# Patient Record
Sex: FEMALE | Race: WHITE | NOT HISPANIC OR LATINO | Employment: OTHER | URBAN - METROPOLITAN AREA
[De-identification: names, ages, dates, MRNs, and addresses within clinical notes are randomized per-mention and may not be internally consistent; named-entity substitution may affect disease eponyms.]

---

## 2017-02-17 ENCOUNTER — OFFICE VISIT (OUTPATIENT)
Dept: OPTOMETRY | Facility: CLINIC | Age: 47
End: 2017-02-17
Payer: COMMERCIAL

## 2017-02-17 DIAGNOSIS — H52.4 PRESBYOPIA: ICD-10-CM

## 2017-02-17 DIAGNOSIS — H43.812 PVD (POSTERIOR VITREOUS DETACHMENT), LEFT EYE: ICD-10-CM

## 2017-02-17 DIAGNOSIS — H35.712 CENTRAL SEROUS RETINOPATHY, LEFT: Primary | ICD-10-CM

## 2017-02-17 PROCEDURE — 92134 CPTRZ OPH DX IMG PST SGM RTA: CPT | Mod: S$GLB,,, | Performed by: OPTOMETRIST

## 2017-02-17 PROCEDURE — 99999 PR PBB SHADOW E&M-EST. PATIENT-LVL II: CPT | Mod: PBBFAC,,, | Performed by: OPTOMETRIST

## 2017-02-17 PROCEDURE — 92015 DETERMINE REFRACTIVE STATE: CPT | Mod: S$GLB,,, | Performed by: OPTOMETRIST

## 2017-02-17 PROCEDURE — 92014 COMPRE OPH EXAM EST PT 1/>: CPT | Mod: S$GLB,,, | Performed by: OPTOMETRIST

## 2017-02-17 NOTE — PROGRESS NOTES
HPI     Ocular health exam   KATHARINE 09/11/2014  Ms. Lobato is here today to continue ocular health care.  Pt sts decline in va both dist and reading since last eye exam   Dist>Reading   Also increased flashing lights (1-2 secs) along with headaches and also   has been having clear/transparent floater in left eye or something   interfering with vision comes and goes. Also gets floaters often.  Hemicrania Left side /Migraines worse within the last 6 weeks.  Just currently wears otc readers +1.25 which help   (+)Flashes (+)Floaters  (-)Itch, (+)tear w/ headaches mainly OS, (-)burn, (-)Dryness.  (-) OTC   Drops  (+)Photophobia w/ headaches   (+)Glare       Last edited by Daniel Saavedra, OD on 2/17/2017  8:34 AM.         Assessment /Plan     For exam results, see Encounter Report.    Central serous retinopathy, left  -     OCT- Retina; Future  -OCT ruled out CSP, loss of FR in OS only, Metamorphopsia on Amsler could be PVD    PVD (posterior vitreous detachment), left eye  -Reviewed signs and symptoms of retinal detachment. Pt instructed to return to clinic immediately with flashes, floaters, or veil of darkness in vision.  Follow up DFE in 1 mos    Presbyopia  Eyeglass Final Rx     Eyeglass Final Rx      Sphere Cylinder   Right +0.75 Sphere   Left +0.75 Sphere       Type:  Metropia - Blaze DFM    Expiration Date:  2/18/2018                  RTC 1 mo

## 2017-03-17 ENCOUNTER — OFFICE VISIT (OUTPATIENT)
Dept: OPTOMETRY | Facility: CLINIC | Age: 47
End: 2017-03-17
Payer: COMMERCIAL

## 2017-03-17 DIAGNOSIS — H43.812 PVD (POSTERIOR VITREOUS DETACHMENT), LEFT EYE: Primary | ICD-10-CM

## 2017-03-17 PROCEDURE — 99999 PR PBB SHADOW E&M-EST. PATIENT-LVL II: CPT | Mod: PBBFAC,,, | Performed by: OPTOMETRIST

## 2017-03-17 PROCEDURE — 92014 COMPRE OPH EXAM EST PT 1/>: CPT | Mod: S$GLB,,, | Performed by: OPTOMETRIST

## 2017-03-17 NOTE — PROGRESS NOTES
HPI     Concerns About Ocular Health    Additional comments: 1 month f/u            Comments   Patient is in for a 1 month f/u PVD   Patient states within the last couple of weeks her blood pressure has been   up and down.    /78 taken at 8:21 am by Nurse Jodie Gallegos per Pt's request  (+) blurry vision  (+) floaters   (+) flashes of light OS reduced since last visit       Last edited by Daniel Saavedra, OD on 3/17/2017  8:43 AM. (History)            Assessment /Plan     For exam results, see Encounter Report.    PVD (posterior vitreous detachment), left eye  -Stable no signs of RD on indirect  -Monitor at annual  -RTC immediately s/sx of RD      RTC 1 yr

## 2017-04-17 ENCOUNTER — OFFICE VISIT (OUTPATIENT)
Dept: INTERNAL MEDICINE | Facility: CLINIC | Age: 47
End: 2017-04-17
Payer: COMMERCIAL

## 2017-04-17 VITALS
RESPIRATION RATE: 18 BRPM | DIASTOLIC BLOOD PRESSURE: 86 MMHG | BODY MASS INDEX: 28.76 KG/M2 | HEART RATE: 76 BPM | TEMPERATURE: 98 F | HEIGHT: 64 IN | WEIGHT: 168.44 LBS | SYSTOLIC BLOOD PRESSURE: 116 MMHG

## 2017-04-17 DIAGNOSIS — J32.9 SINUSITIS, UNSPECIFIED CHRONICITY, UNSPECIFIED LOCATION: Primary | ICD-10-CM

## 2017-04-17 DIAGNOSIS — H67.1 OTITIS MEDIA OF RIGHT EAR IN DISEASE CLASSIFIED ELSEWHERE: ICD-10-CM

## 2017-04-17 PROCEDURE — 99999 PR PBB SHADOW E&M-EST. PATIENT-LVL IV: CPT | Mod: PBBFAC,,, | Performed by: PHYSICIAN ASSISTANT

## 2017-04-17 PROCEDURE — 99213 OFFICE O/P EST LOW 20 MIN: CPT | Mod: S$GLB,,, | Performed by: PHYSICIAN ASSISTANT

## 2017-04-17 PROCEDURE — 1160F RVW MEDS BY RX/DR IN RCRD: CPT | Mod: S$GLB,,, | Performed by: PHYSICIAN ASSISTANT

## 2017-04-17 RX ORDER — AMOXICILLIN 875 MG/1
875 TABLET, FILM COATED ORAL 2 TIMES DAILY
Qty: 14 TABLET | Refills: 0 | Status: SHIPPED | OUTPATIENT
Start: 2017-04-17 | End: 2017-04-27

## 2017-04-17 NOTE — MR AVS SNAPSHOT
[facility]  [address]  [place] [address]  Phone: [phone]  Fax: [phone]                  Cheryle Olivares   2017 2:30 PM   Office Visit    Description:  Female : 1970   Provider:  Hue Bansal PA-C   Department:  [facility]           Reason for Visit     Chest Congestion     Otalgia           Diagnoses this Visit        Comments    Sinusitis, unspecified chronicity, unspecified location    -  Primary     Otitis media of right ear in disease classified elsewhere                To Do List           Goals (5 Years of Data)     None      Follow-Up and Disposition     Return if symptoms worsen or fail to improve.       These Medications        Disp Refills Start End    amoxicillin (AMOXIL) 875 MG tablet 14 tablet 0 2017    Take 1 tablet (875 mg total) by mouth 2 (two) times daily. - Oral    Pharmacy: [facility]/pharmacy #[id number] - [place], [place] - [address]. Ph #: [phone]         [facility]     [facility] Nurse Care Line -  Assistance  Unless otherwise directed by your provider, please contact [facility], our nurse care line that is available for  assistance.     Registered nurses in the [facility] provide: appointment scheduling, clinical advisement, health education, and other advisory services.  Call: [phone] (toll free)               Medications           Message regarding Medications     Verify the changes and/or additions to your medication regime listed below are the same as discussed with your clinician today.  If any of these changes or additions are incorrect, please notify your healthcare provider.        START taking these NEW medications        Refills    amoxicillin (AMOXIL) 875 MG tablet 0    Sig: Take 1 tablet (875 mg total) by mouth 2 (two) times daily.    Class: Normal    Route: Oral      STOP taking these medications     fish oil-omega-3 fatty  "acids 300-1,000 mg capsule Take 2 g by mouth once daily.    FLUVIRIN [id number] 45 mcg (15 mcg x 3)/0.5 mL Susp ADM 0.5ML IM UTD           Verify that the below list of medications is an accurate representation of the medications you are currently taking.  If none reported, the list may be blank. If incorrect, please contact your healthcare provider. Carry this list with you in case of emergency.           Current Medications     cetirizine (ZYRTEC) 10 MG tablet Take 10 mg by mouth once daily.      indomethacin (INDOCIN) 25 MG capsule Take 1 capsule (25 mg total) by mouth every 6 (six) hours as needed.    multivitamin capsule Take 1 capsule by mouth once daily.    topiramate (TOPAMAX) 25 MG tablet Take 25 mg by mouth once daily.     amoxicillin (AMOXIL) 875 MG tablet Take 1 tablet (875 mg total) by mouth 2 (two) times daily.           Clinical Reference Information           Your Vitals Were     BP Pulse Temp Resp Height Weight    116/86 76 98.2 °F (36.8 °C) 18 5' 4" (1.626 m) 76.4 kg (168 lb 6.9 oz)    Last Period BMI             11/27/2015 (Exact Date) 28.91 kg/m2         Blood Pressure          Most Recent Value    BP  116/86      Allergies as of 4/17/2017     No Known Allergies      Immunizations Administered on Date of Encounter - 4/17/2017     None      Instructions      Middle Ear Infection (Adult)  You have an infection of the middle ear, the space behind the eardrum. This is also called acute otitis media (AOM). Sometimes it is caused by the common cold. This is because congestion can block the internal passage (eustachian tube) that drains fluid from the middle ear. When the middle ear fills with fluid, bacteria can grow there and cause an infection. Oral antibiotics are used to treat this illness, not ear drops. Symptoms usually start to improve within 1 to 2 days of treatment.    Home care  The following are general care guidelines:  · Finish all of the antibiotic medicine given, even though you may feel " better after the first few days.  · You may use over-the-counter medicine, such as acetaminophen or ibuprofen, to control pain and fever, unless something else was prescribed. If you have chronic liver or kidney disease or have ever had a stomach ulcer or gastrointestinal bleeding, talk with your healthcare provider before using these medicines. Do not give aspirin to anyone under 18 years of age who has a fever. It may cause severe illness or death.  Follow-up care  Follow up with your healthcare provider, or as advised, in 2 weeks if all symptoms have not gotten better, or if hearing doesn't go back to normal within 1 month.  When to seek medical advice  Call your healthcare provider right away if any of these occur:  · Ear pain gets worse or does not improve after 3 days of treatment  · Unusual drowsiness or confusion  · Neck pain, stiff neck, or headache  · Fluid or blood draining from the ear canal  · Fever of 100.4°F (38°C) or as advised   · Seizure  Date Last Reviewed: 6/1/2016 © 2000-2016 [employer]. [address] [place] [address]. All rights reserved. This information is not intended as a substitute for professional medical care. Always follow your healthcare professional's instructions.        Sinusitis (Antibiotic Treatment)    The sinuses are air-filled spaces within the bones of the face. They connect to the inside of the nose. Sinusitis is an inflammation of the tissue lining the sinus cavity. Sinus inflammation can occur during a cold. It can also be due to allergies to pollens and other particles in the air. Sinusitis can cause symptoms of sinus congestion and fullness. A sinus infection causes fever, headache and facial pain. There is often green or yellow drainage from the nose or into the back of the throat (post-nasal drip). You have been given antibiotics to treat this condition.  Home care:  · Take the full course of antibiotics as instructed. Do not stop taking them,  even if you feel better.  · Drink plenty of water, hot tea, and other liquids. This may help thin mucus. It also may promote sinus drainage.  · Heat may help soothe painful areas of the face. Use a towel soaked in hot water. Or,  the shower and direct the hot spray onto your face. Using a vaporizer along with a menthol rub at night may also help.   · An expectorant containing guaifenesin may help thin the mucus and promote drainage from the sinuses.  · Over-the-counter decongestants may be used unless a similar medicine was prescribed. Nasal sprays work the fastest. Use one that contains phenylephrine or oxymetazoline. First blow the nose gently. Then use the spray. Do not use these medicines more often than directed on the label or symptoms may get worse. You may also use tablets containing pseudoephedrine. Avoid products that combine ingredients, because side effects may be increased. Read labels. You can also ask the pharmacist for help. (NOTE: Persons with high blood pressure should not use decongestants. They can raise blood pressure.)  · Over-the-counter antihistamines may help if allergies contributed to your sinusitis.    · Do not use nasal rinses or irrigation during an acute sinus infection, unless told to by your health care provider. Rinsing may spread the infection to other sinuses.  · Use acetaminophen or ibuprofen to control pain, unless another pain medicine was prescribed. (If you have chronic liver or kidney disease or ever had a stomach ulcer, talk with your doctor before using these medicines. Aspirin should never be used in anyone under 18 years of age who is ill with a fever. It may cause severe liver damage.)  · Don't smoke. This can worsen symptoms.  Follow-up care  Follow up with your healthcare provider or our staff if you are not improving within the next week.  When to seek medical advice  Call your healthcare provider if any of these occur:  · Facial pain or headache becoming  more severe  · Stiff neck  · Unusual drowsiness or confusion  · Swelling of the forehead or eyelids  · Vision problems, including blurred or double vision  · Fever of 100.4ºF (38ºC) or higher, or as directed by your healthcare provider  · Seizure  · Breathing problems  · Symptoms not resolving within 10 days  Date Last Reviewed: 4/13/2015 © 2000-2016 [employer]. [address] [place] [address], [place] [address]. All rights reserved. This information is not intended as a substitute for professional medical care. Always follow your healthcare professional's instructions.             Language Assistance Services     ATTENTION: Language assistance services are available, free of charge. Please call [phone].      ATENCIÓN: Si gela eugene, tiene a barrow disposición servicios gratuitos de asistencia lingüística. Llame al [phone].     CHÚ Ý: N?u b?n nói Ti?ng Vi?t, có các d?ch v? h? tr? ngôn ng? mi?n phí dành cho b?n. G?i s? [phone].         [facility] complies with applicable Federal civil rights laws and does not discriminate on the basis of race, color, national origin, age, disability, or sex.

## 2017-04-17 NOTE — PATIENT INSTRUCTIONS
Middle Ear Infection (Adult)  You have an infection of the middle ear, the space behind the eardrum. This is also called acute otitis media (AOM). Sometimes it is caused by the common cold. This is because congestion can block the internal passage (eustachian tube) that drains fluid from the middle ear. When the middle ear fills with fluid, bacteria can grow there and cause an infection. Oral antibiotics are used to treat this illness, not ear drops. Symptoms usually start to improve within 1 to 2 days of treatment.    Home care  The following are general care guidelines:  · Finish all of the antibiotic medicine given, even though you may feel better after the first few days.  · You may use over-the-counter medicine, such as acetaminophen or ibuprofen, to control pain and fever, unless something else was prescribed. If you have chronic liver or kidney disease or have ever had a stomach ulcer or gastrointestinal bleeding, talk with your healthcare provider before using these medicines. Do not give aspirin to anyone under 18 years of age who has a fever. It may cause severe illness or death.  Follow-up care  Follow up with your healthcare provider, or as advised, in 2 weeks if all symptoms have not gotten better, or if hearing doesn't go back to normal within 1 month.  When to seek medical advice  Call your healthcare provider right away if any of these occur:  · Ear pain gets worse or does not improve after 3 days of treatment  · Unusual drowsiness or confusion  · Neck pain, stiff neck, or headache  · Fluid or blood draining from the ear canal  · Fever of 100.4°F (38°C) or as advised   · Seizure  Date Last Reviewed: 6/1/2016 © 2000-2016 [employer]. [address] [place] [address], [place] [address]. All rights reserved. This information is not intended as a substitute for professional medical care. Always follow your healthcare professional's instructions.        Sinusitis (Antibiotic Treatment)    The  sinuses are air-filled spaces within the bones of the face. They connect to the inside of the nose. Sinusitis is an inflammation of the tissue lining the sinus cavity. Sinus inflammation can occur during a cold. It can also be due to allergies to pollens and other particles in the air. Sinusitis can cause symptoms of sinus congestion and fullness. A sinus infection causes fever, headache and facial pain. There is often green or yellow drainage from the nose or into the back of the throat (post-nasal drip). You have been given antibiotics to treat this condition.  Home care:  · Take the full course of antibiotics as instructed. Do not stop taking them, even if you feel better.  · Drink plenty of water, hot tea, and other liquids. This may help thin mucus. It also may promote sinus drainage.  · Heat may help soothe painful areas of the face. Use a towel soaked in hot water. Or,  the shower and direct the hot spray onto your face. Using a vaporizer along with a menthol rub at night may also help.   · An expectorant containing guaifenesin may help thin the mucus and promote drainage from the sinuses.  · Over-the-counter decongestants may be used unless a similar medicine was prescribed. Nasal sprays work the fastest. Use one that contains phenylephrine or oxymetazoline. First blow the nose gently. Then use the spray. Do not use these medicines more often than directed on the label or symptoms may get worse. You may also use tablets containing pseudoephedrine. Avoid products that combine ingredients, because side effects may be increased. Read labels. You can also ask the pharmacist for help. (NOTE: Persons with high blood pressure should not use decongestants. They can raise blood pressure.)  · Over-the-counter antihistamines may help if allergies contributed to your sinusitis.    · Do not use nasal rinses or irrigation during an acute sinus infection, unless told to by your health care provider. Rinsing may  spread the infection to other sinuses.  · Use acetaminophen or ibuprofen to control pain, unless another pain medicine was prescribed. (If you have chronic liver or kidney disease or ever had a stomach ulcer, talk with your doctor before using these medicines. Aspirin should never be used in anyone under 18 years of age who is ill with a fever. It may cause severe liver damage.)  · Don't smoke. This can worsen symptoms.  Follow-up care  Follow up with your healthcare provider or our staff if you are not improving within the next week.  When to seek medical advice  Call your healthcare provider if any of these occur:  · Facial pain or headache becoming more severe  · Stiff neck  · Unusual drowsiness or confusion  · Swelling of the forehead or eyelids  · Vision problems, including blurred or double vision  · Fever of 100.4ºF (38ºC) or higher, or as directed by your healthcare provider  · Seizure  · Breathing problems  · Symptoms not resolving within 10 days  Date Last Reviewed: 4/13/2015  © 9784-1407 [employer]. [address] [place] [address], [place] [address]. All rights reserved. This information is not intended as a substitute for professional medical care. Always follow your healthcare professional's instructions.

## 2017-04-17 NOTE — PROGRESS NOTES
Subjective:       Patient ID: Cheryle Olivares is a 47 y.o. female.        Chief Complaint: Chest Congestion and Otalgia (5 left side)    HPI Comments: Cheryle Olivares is an established patient of Any Kinsey MD here today for urgent care visit.    Left ear pain, sinus pressure, nasal congestion, cough x 10 days with worsening of sx and associated sinus tenderness and pressure in past 2-3 days.  No fever, chest pain, shortness of breath.    No N/V/D/C.           Review of Systems   Constitutional: Negative for chills, diaphoresis, fatigue and fever.   HENT: Positive for congestion, ear pain, rhinorrhea and sinus pressure. Negative for sore throat.    Eyes: Negative for visual disturbance.   Respiratory: Positive for cough. Negative for chest tightness and shortness of breath.    Cardiovascular: Negative for chest pain, palpitations and leg swelling.   Gastrointestinal: Negative for abdominal pain, blood in stool, constipation, diarrhea, nausea and vomiting.   Genitourinary: Negative for dysuria, frequency, hematuria and urgency.   Musculoskeletal: Negative for arthralgias and back pain.   Skin: Negative for rash.   Neurological: Negative for dizziness, syncope, weakness and headaches.   Psychiatric/Behavioral: Negative for dysphoric mood and sleep disturbance. The patient is not nervous/anxious.        Objective:      Physical Exam   Constitutional: She appears well-developed and well-nourished. No distress.   HENT:   Head: Normocephalic and atraumatic.   Right Ear: External ear normal. Tympanic membrane is erythematous (very mild). A middle ear effusion is present.   Left Ear: External ear normal. Tympanic membrane is not erythematous. A middle ear effusion (slight) is present.   Nose: Rhinorrhea present. Right sinus exhibits no maxillary sinus tenderness and no frontal sinus tenderness. Left sinus exhibits maxillary sinus tenderness. Left sinus exhibits no frontal sinus tenderness.   Mouth/Throat:  "Oropharynx is clear and moist.   Eyes: Conjunctivae are normal. Pupils are equal, round, and reactive to light.   Cardiovascular: Normal rate, regular rhythm and normal heart sounds.  Exam reveals no gallop.    No murmur heard.  Pulmonary/Chest: Effort normal and breath sounds normal. No respiratory distress.   Abdominal: Soft. Normal appearance. There is no tenderness. There is no rebound, no guarding and no CVA tenderness.   Musculoskeletal: She exhibits no edema.   Neurological: She is alert.   Skin: Skin is warm and dry. She is not diaphoretic.   Psychiatric: She has a normal mood and affect.   Nursing note and vitals reviewed.      Assessment:       1. Sinusitis, unspecified chronicity, unspecified location    2. Otitis media of right ear in disease classified elsewhere        Plan:       Cheryle was seen today for chest congestion and otalgia.    Diagnoses and all orders for this visit:    Sinusitis, unspecified chronicity, unspecified location  -     amoxicillin (AMOXIL) 875 MG tablet; Take 1 tablet (875 mg total) by mouth 2 (two) times daily.    Otitis media of right ear in disease classified elsewhere  -     amoxicillin (AMOXIL) 875 MG tablet; Take 1 tablet (875 mg total) by mouth 2 (two) times daily.    Drink plenty of fluids, get lots of rest, and follow-up poor results.      Pt has been given instructions populated from [employer] database and has verbalized understanding of the after visit summary and information contained wherein.    Follow up with a primary care provider. May go to ER for acute shortness of breath, lightheadedness, fever, or any other emergent complaints or changes in condition.    "This note will be shared with the patient"    No future appointments.            "

## 2017-05-15 ENCOUNTER — TELEPHONE (OUTPATIENT)
Dept: INTERNAL MEDICINE | Facility: CLINIC | Age: 47
End: 2017-05-15

## 2017-05-15 DIAGNOSIS — R92.1 BREAST CALCIFICATION SEEN ON MAMMOGRAM: ICD-10-CM

## 2017-05-15 NOTE — TELEPHONE ENCOUNTER
----- Message from Hannah Meneses sent at 5/15/2017 10:45 AM CDT -----  Contact: Pt at [phone]  Pt requesting orders to be put in for her to receive a mammogram.

## 2017-05-15 NOTE — TELEPHONE ENCOUNTER
Please see message below and advise. Pt requesting mammo. Last mammo 04/16/2016. Overdue for mammo.

## 2017-05-22 ENCOUNTER — HOSPITAL ENCOUNTER (OUTPATIENT)
Dept: RADIOLOGY | Facility: HOSPITAL | Age: 47
Discharge: HOME | End: 2017-05-22
Attending: FAMILY MEDICINE
Payer: COMMERCIAL

## 2017-05-22 DIAGNOSIS — R92.1 BREAST CALCIFICATION SEEN ON MAMMOGRAM: ICD-10-CM

## 2017-05-22 PROCEDURE — 77062 BREAST TOMOSYNTHESIS BI: CPT | Mod: 26,,, | Performed by: RADIOLOGY

## 2017-05-22 PROCEDURE — 77062 BREAST TOMOSYNTHESIS BI: CPT | Mod: TC

## 2017-05-22 PROCEDURE — 77066 DX MAMMO INCL CAD BI: CPT | Mod: 26,,, | Performed by: RADIOLOGY

## 2017-05-23 ENCOUNTER — INITIAL CONSULT (OUTPATIENT)
Dept: DERMATOLOGY | Facility: CLINIC | Age: 47
End: 2017-05-23
Payer: COMMERCIAL

## 2017-05-23 VITALS — WEIGHT: 168 LBS | BODY MASS INDEX: 28.84 KG/M2

## 2017-05-23 DIAGNOSIS — D22.9 NEVUS OF MULTIPLE SITES: ICD-10-CM

## 2017-05-23 DIAGNOSIS — L73.9 FOLLICULITIS: Primary | ICD-10-CM

## 2017-05-23 PROCEDURE — 99214 OFFICE O/P EST MOD 30 MIN: CPT | Mod: S$GLB,,, | Performed by: DERMATOLOGY

## 2017-05-23 PROCEDURE — 99999 PR PBB SHADOW E&M-EST. PATIENT-LVL II: CPT | Mod: PBBFAC,,, | Performed by: DERMATOLOGY

## 2017-05-23 PROCEDURE — 1160F RVW MEDS BY RX/DR IN RCRD: CPT | Mod: S$GLB,,, | Performed by: DERMATOLOGY

## 2017-05-23 RX ORDER — IBUPROFEN 800 MG/1
800 TABLET ORAL
Refills: 0 | COMMUNITY
Start: 2017-05-15 | End: 2018-02-23

## 2017-05-23 RX ORDER — HYDROCODONE BITARTRATE AND ACETAMINOPHEN 7.5; 325 MG/1; MG/1
1 TABLET ORAL
Refills: 0 | COMMUNITY
Start: 2017-05-15 | End: 2017-08-20

## 2017-05-24 NOTE — PROGRESS NOTES
"  Subjective:       Patient ID:  Cheryle Olivares is a 47 y.o. female who presents for   Chief Complaint   Patient presents with    Skin Check     TBSE    Spot     back     History of Present Illness: The patient presents with chief complaint of rash.  Location: right thigh  Duration: weeks  Signs/Symptoms: none    Prior treatments: none    Started after using new soap with fragrance and excercising in heat.        Spot         Review of Systems   Constitutional: Negative for fever.   Skin: Positive for rash. Negative for itching.   Hematologic/Lymphatic: Does not bruise/bleed easily.        Objective:    Physical Exam   Constitutional: She appears well-developed and well-nourished. No distress.   Neurological: She is alert and oriented to person, place, and time. She is not disoriented.   Psychiatric: She has a normal mood and affect.   Skin:   Areas Examined (abnormalities noted in diagram):   Head / Face Inspection Performed  Neck Inspection Performed  Chest / Axilla Inspection Performed  Abdomen Inspection Performed  Back Inspection Performed  RUE Inspected  LUE Inspection Performed  RLE Inspected  LLE Inspection Performed  Nails and Digits Inspection Performed                        Assessment / Plan:        Folliculitis  hibiclens weekly in shower  olux e foam with clindamycin foam bid until clear    Nevus of multiple sites  The "ABCD" rules to observe pigmented lesions were reviewed.  Brochure provided               Return in about 1 year (around 5/23/2018).  "

## 2017-07-24 ENCOUNTER — TELEPHONE (OUTPATIENT)
Dept: OBSTETRICS AND GYNECOLOGY | Facility: CLINIC | Age: 47
End: 2017-07-24

## 2017-07-24 RX ORDER — AMOXICILLIN 500 MG/1
500 CAPSULE ORAL EVERY 8 HOURS
Qty: 30 CAPSULE | Refills: 0 | Status: SHIPPED | OUTPATIENT
Start: 2017-07-24 | End: 2017-08-03

## 2017-08-19 ENCOUNTER — NURSE TRIAGE (OUTPATIENT)
Dept: ADMINISTRATIVE | Facility: CLINIC | Age: 47
End: 2017-08-19

## 2017-08-19 PROCEDURE — 96374 THER/PROPH/DIAG INJ IV PUSH: CPT

## 2017-08-19 PROCEDURE — 99284 EMERGENCY DEPT VISIT MOD MDM: CPT | Mod: 25

## 2017-08-19 PROCEDURE — 99284 EMERGENCY DEPT VISIT MOD MDM: CPT | Mod: ,,, | Performed by: PHYSICIAN ASSISTANT

## 2017-08-19 PROCEDURE — 96361 HYDRATE IV INFUSION ADD-ON: CPT

## 2017-08-19 NOTE — TELEPHONE ENCOUNTER
Reason for Disposition   Urinating more frequently than usual (i.e., frequency)    Protocols used: ST URINARY SYMPTOMS-A-AH

## 2017-08-20 ENCOUNTER — HOSPITAL ENCOUNTER (EMERGENCY)
Facility: HOSPITAL | Age: 47
Discharge: HOME | End: 2017-08-20
Attending: EMERGENCY MEDICINE
Payer: COMMERCIAL

## 2017-08-20 VITALS
OXYGEN SATURATION: 100 % | SYSTOLIC BLOOD PRESSURE: 123 MMHG | DIASTOLIC BLOOD PRESSURE: 76 MMHG | TEMPERATURE: 98 F | BODY MASS INDEX: 28.17 KG/M2 | WEIGHT: 165 LBS | HEART RATE: 74 BPM | HEIGHT: 64 IN | RESPIRATION RATE: 18 BRPM

## 2017-08-20 DIAGNOSIS — N20.0 RENAL STONE: Primary | ICD-10-CM

## 2017-08-20 LAB
ALBUMIN SERPL BCP-MCNC: 4 G/DL
ALP SERPL-CCNC: 81 U/L
ALT SERPL W/O P-5'-P-CCNC: 12 U/L
ANION GAP SERPL CALC-SCNC: 9 MMOL/L
AST SERPL-CCNC: 21 U/L
BASOPHILS # BLD AUTO: 0.02 K/UL
BASOPHILS NFR BLD: 0.2 %
BILIRUB SERPL-MCNC: 0.4 MG/DL
BILIRUB UR QL STRIP: NEGATIVE
BUN SERPL-MCNC: 12 MG/DL
CALCIUM SERPL-MCNC: 9.1 MG/DL
CHLORIDE SERPL-SCNC: 103 MMOL/L
CLARITY UR REFRACT.AUTO: CLEAR
CO2 SERPL-SCNC: 26 MMOL/L
COLOR UR AUTO: COLORLESS
CREAT SERPL-MCNC: 0.8 MG/DL
DIFFERENTIAL METHOD: ABNORMAL
EOSINOPHIL # BLD AUTO: 0.1 K/UL
EOSINOPHIL NFR BLD: 0.5 %
ERYTHROCYTE [DISTWIDTH] IN BLOOD BY AUTOMATED COUNT: 12.8 %
EST. GFR  (AFRICAN AMERICAN): >60 ML/MIN/1.73 M^2
EST. GFR  (NON AFRICAN AMERICAN): >60 ML/MIN/1.73 M^2
GLUCOSE SERPL-MCNC: 92 MG/DL
GLUCOSE UR QL STRIP: NEGATIVE
HCT VFR BLD AUTO: 39.4 %
HGB BLD-MCNC: 13.8 G/DL
HGB UR QL STRIP: ABNORMAL
KETONES UR QL STRIP: NEGATIVE
LEUKOCYTE ESTERASE UR QL STRIP: ABNORMAL
LYMPHOCYTES # BLD AUTO: 2.1 K/UL
LYMPHOCYTES NFR BLD: 19.5 %
MCH RBC QN AUTO: 31.9 PG
MCHC RBC AUTO-ENTMCNC: 35 G/DL
MCV RBC AUTO: 91 FL
MICROSCOPIC COMMENT: NORMAL
MONOCYTES # BLD AUTO: 0.8 K/UL
MONOCYTES NFR BLD: 7.2 %
NEUTROPHILS # BLD AUTO: 7.9 K/UL
NEUTROPHILS NFR BLD: 72.4 %
NITRITE UR QL STRIP: NEGATIVE
PH UR STRIP: 6 [PH] (ref 5–8)
PLATELET # BLD AUTO: 191 K/UL
PMV BLD AUTO: 9.7 FL
POTASSIUM SERPL-SCNC: 3.1 MMOL/L
PROT SERPL-MCNC: 7.3 G/DL
PROT UR QL STRIP: NEGATIVE
RBC # BLD AUTO: 4.33 M/UL
RBC #/AREA URNS AUTO: 1 /HPF (ref 0–4)
SODIUM SERPL-SCNC: 138 MMOL/L
SP GR UR STRIP: 1 (ref 1–1.03)
SQUAMOUS #/AREA URNS AUTO: 0 /HPF
URN SPEC COLLECT METH UR: ABNORMAL
UROBILINOGEN UR STRIP-ACNC: NEGATIVE EU/DL
WBC # BLD AUTO: 10.97 K/UL
WBC #/AREA URNS AUTO: 1 /HPF (ref 0–5)

## 2017-08-20 PROCEDURE — 85025 COMPLETE CBC W/AUTO DIFF WBC: CPT

## 2017-08-20 PROCEDURE — 81001 URINALYSIS AUTO W/SCOPE: CPT

## 2017-08-20 PROCEDURE — 80053 COMPREHEN METABOLIC PANEL: CPT

## 2017-08-20 PROCEDURE — 63600175 PHARM REV CODE 636 W HCPCS: Performed by: PHYSICIAN ASSISTANT

## 2017-08-20 PROCEDURE — 25000003 PHARM REV CODE 250: Performed by: PHYSICIAN ASSISTANT

## 2017-08-20 RX ORDER — ONDANSETRON 4 MG/1
4 TABLET, FILM COATED ORAL EVERY 6 HOURS
Qty: 12 TABLET | Refills: 0 | Status: SHIPPED | OUTPATIENT
Start: 2017-08-20 | End: 2018-01-15

## 2017-08-20 RX ORDER — HYDROCODONE BITARTRATE AND ACETAMINOPHEN 5; 325 MG/1; MG/1
1 TABLET ORAL EVERY 4 HOURS PRN
Qty: 12 TABLET | Refills: 0 | Status: SHIPPED | OUTPATIENT
Start: 2017-08-20 | End: 2017-11-29

## 2017-08-20 RX ORDER — KETOROLAC TROMETHAMINE 30 MG/ML
15 INJECTION, SOLUTION INTRAMUSCULAR; INTRAVENOUS
Status: COMPLETED | OUTPATIENT
Start: 2017-08-20 | End: 2017-08-20

## 2017-08-20 RX ORDER — NITROFURANTOIN 25; 75 MG/1; MG/1
100 CAPSULE ORAL 2 TIMES DAILY
Qty: 10 CAPSULE | Refills: 0 | Status: SHIPPED | OUTPATIENT
Start: 2017-08-20 | End: 2017-08-25

## 2017-08-20 RX ADMIN — KETOROLAC TROMETHAMINE 15 MG: 30 INJECTION, SOLUTION INTRAMUSCULAR at 01:08

## 2017-08-20 RX ADMIN — SODIUM CHLORIDE 1000 ML: 0.9 INJECTION, SOLUTION INTRAVENOUS at 01:08

## 2017-08-20 NOTE — ED NOTES
Two patient identifiers have been checked and are correct.      Appearance: Pt awake, alert & oriented to person, place & time. Pt in no acute distress at present time. Pt is clean and well groomed with clothes appropriately fastened.   Skin: Skin warm, dry & intact. Color consistent with ethnicity. Mucous membranes moist. No breakdown or brusing noted.   Musculoskeletal: Patient moving all extremities well, no obvious swelling or deformities noted.   Respiratory: Respirations spontaneous, even, and non-labored. Visible chest rise noted. Airway is open and patent. No accessory muscle use noted. Denies SOB.  Neurologic: Sensation is intact. Speech is clear and appropriate. Eyes open spontaneously, behavior appropriate to situation, follows commands, facial expression symmetrical, bilateral hand grasp equal and even, purposeful motor response noted. Denies HA.  Cardiac: All peripheral pulses present. No Bilateral lower extremity edema. Cap refill is <3 seconds. Denies CP.  Abdomen: Abdomen soft, non-tender to palpation. Pt reports abd discomfort, and tenderness to left flank area.   : Pt reports no dysuria or hematuria. Denies fever. Reports burning with urination.

## 2017-08-20 NOTE — ED NOTES
System Downtime Recovery  The EMR experienced a system downtime. During this downtime paper charting was completed. See all prior downtime charting. Will resume with Epic charting starting now.

## 2017-08-20 NOTE — TELEPHONE ENCOUNTER
"Pt called re appt made in am early. Suspect UTI.     Reason for Disposition   [1] MILD-MODERATE pain AND [2] constant AND [3] present > 2 hours    Answer Assessment - Initial Assessment Questions  1. SYMPTOM: "What's the main symptom you're concerned about?" (e.g., frequency, incontinence)     C/o pain on left side, afeb, no blood in urine, burning freq, front and abd pain.    2. ONSET: "When did the  ________  start?"     This am   3. PAIN: "Is there any pain?" If so, ask: "How bad is it?" (Scale: 1-10; mild, moderate, severe)     Rates pain 5/10, took azo OTC  4. CAUSE: "What do you think is causing the symptoms?"     UTI   5. OTHER SYMPTOMS: "Do you have any other symptoms?" (e.g., fever, flank pain, blood in urine, pain with urination)     As above   6. PREGNANCY: "Is there any chance you are pregnant?" "When was your last menstrual period?"     No    Answer Assessment - Initial Assessment Questions  1. LOCATION: "Where does it hurt?"      Below belly button   2. RADIATION: "Does the pain shoot anywhere else?" (e.g., chest, back)      Stomach feeling funky yest evening. Discomfort   3. ONSET: "When did the pain begin?" (e.g., minutes, hours or days ago)      Last evening   4. SUDDEN: "Gradual or sudden onset?"     Gradual   5. PATTERN "Does the pain come and go, or is it constant?"     - If constant: "Is it getting better, staying the same, or worsening?"       (Note: Constant means the pain never goes away completely; most serious pain is constant and it progresses)      - If intermittent: "How long does it last?" "Do you have pain now?"      (Note: Intermittent means the pain goes away completely between bouts)    Constant discomfort   6. SEVERITY: "How bad is the pain?"  (e.g., Scale 1-10; mild, moderate, or severe)    - MILD (1-3): doesn't interfere with normal activities, abdomen soft and not tender to touch     - MODERATE (4-7): interferes with normal activities or awakens from sleep, tender to touch     " "- SEVERE (8-10): excruciating pain, doubled over, unable to do any normal activities      5/10   7. RECURRENT SYMPTOM: "Have you ever had this type of abdominal pain before?" If so, ask: "When was the last time?" and "What happened that time?"      Took azo at 730pm   No   8. CAUSE: "What do you think is causing the abdominal pain?"     UTI   9. RELIEVING/AGGRAVATING FACTORS: "What makes it better or worse?" (e.g., movement, antacids, bowel movement)     Walking around fine. No BM today   10. OTHER SYMPTOMS: "Has there been any vomiting, diarrhea, constipation, or urine problems?"       urinary pblm, had lose stool yest    Protocols used: ST ABDOMINAL PAIN - FEMALE-A-, ST URINARY SYMPTOMS-A-  spoke with MD on call dr hernandez- reiterated rec triager made. Pt notified. Call back with questions.     "

## 2017-08-20 NOTE — ED PROVIDER NOTES
Encounter Date: 8/19/2017    SCRIBE #1 NOTE: I, Agustín Fleming, am scribing for, and in the presence of,  Dr. Garvin. I have scribed the following portions of the note - the APC attestation.       History     Chief Complaint   Patient presents with    Flank Pain     seen at urgent care dx with uti- told to come by nurse on call - denies fever , hematuria     This is a 47 year old female with a PMH of anemia and migraine headaches who presents to the ED with a chief complaint of dysuria. Patient reports a 2 day history of burning with urination, urinary frequency and urgency. Today she developed pain in the lower abdomen radiating to the left flank and back. She endorses loose stool. Denies fever, chills, chest pain, SOB, nausea/vomiting, anorexia, hematuria, vaginal discharge, melena or hematochezia. Surgical hx of partial hysterectomy.          Review of patient's allergies indicates:  No Known Allergies  Past Medical History:   Diagnosis Date    Allergy     Anemia     History of migraine headaches     Migraine headache     Morbid obesity     Preeclampsia, severe 7/15/2013     Past Surgical History:   Procedure Laterality Date    HYSTERECTOMY      TVH WITHOUT OOPHORECTOMY BLEEDING 12/2015 EK    left knee surgery       Family History   Problem Relation Age of Onset    Skin cancer Father     Melanoma Father     Skin cancer Brother     Melanoma Brother     Colon cancer Maternal Uncle     Miscarriages / Stillbirths Sister     Retinal detachment Mother     Macular degeneration Mother     Hypertension Mother     Hypertension Maternal Grandmother     Macular degeneration Maternal Grandfather     Cataracts Maternal Grandfather     Heart attack Maternal Grandfather     Heart disease Maternal Grandfather     Coronary artery disease Paternal Grandfather     No Known Problems Maternal Aunt     No Known Problems Paternal Aunt     No Known Problems Paternal Uncle     No Known Problems Paternal  Grandmother     Amblyopia Neg Hx     Blindness Neg Hx     Cancer Neg Hx     Diabetes Neg Hx     Glaucoma Neg Hx     Strabismus Neg Hx     Stroke Neg Hx     Thyroid disease Neg Hx     Breast cancer Neg Hx     Ovarian cancer Neg Hx      Social History   Substance Use Topics    Smoking status: Never Smoker    Smokeless tobacco: Never Used    Alcohol use No     Review of Systems   Constitutional: Negative for chills and fever.   HENT: Negative for sore throat.    Respiratory: Negative for shortness of breath.    Cardiovascular: Negative for chest pain.   Gastrointestinal: Positive for abdominal pain. Negative for nausea and vomiting.   Genitourinary: Positive for dysuria, flank pain, frequency and urgency. Negative for hematuria, vaginal bleeding and vaginal discharge.   Musculoskeletal: Positive for back pain.   Skin: Negative for rash.   Neurological: Negative for weakness.   Hematological: Does not bruise/bleed easily.       Physical Exam     Initial Vitals [08/19/17 2148]   BP Pulse Resp Temp SpO2   (!) 160/86 78 18 97.9 °F (36.6 °C) 100 %      MAP       110.67         Physical Exam    Constitutional: She appears well-developed and well-nourished. No distress.   HENT:   Head: Atraumatic.   Eyes: Conjunctivae and EOM are normal. Pupils are equal, round, and reactive to light.   Neck: Normal range of motion. Neck supple.   Cardiovascular: Normal rate, regular rhythm and normal heart sounds.   Pulmonary/Chest: Breath sounds normal. No respiratory distress. She has no wheezes. She has no rhonchi. She has no rales.   Abdominal: Soft. Bowel sounds are normal. There is tenderness in the suprapubic area. There is CVA tenderness (left). There is no rebound and no guarding.   Neurological: She is alert and oriented to person, place, and time.   Skin: Skin is warm and dry. No rash noted.         ED Course   Procedures  Labs Reviewed   URINALYSIS, REFLEX TO URINE CULTURE - Abnormal; Notable for the following:        " Result Value    Color, UA Colorless (*)     Specific Saxis, UA 1.000 (*)     Occult Blood UA 3+ (*)     Leukocytes, UA Trace (*)     All other components within normal limits   CBC W/ AUTO DIFFERENTIAL - Abnormal; Notable for the following:     MCH 31.9 (*)     Gran # 7.9 (*)     All other components within normal limits   COMPREHENSIVE METABOLIC PANEL - Abnormal; Notable for the following:     Potassium 3.1 (*)     All other components within normal limits   URINALYSIS MICROSCOPIC             Medical Decision Making:   Initial Assessment:   46yo female with flank pain and hematuria    Differential Diagnosis:   Please see prior APC / Resident notes  Clinical Tests:   Lab Tests: Ordered  Radiological Study: Ordered  ED Management:  3:59 AM - Preliminary read on CT scan demonstrates non-obstructive renal stone measuring 0.5cm. Case discussed with attending physician.       APC / Resident Notes:   47 year old female presents with urinary symptoms and left flank pain.  On exam she is afebrile and nontoxic. Abdomen is soft, nontender. There is CVA tenderness on the left.    DDX includes but is not limited to UTI, pyelonephritis, diverticulitis.     Normal WBC 10, renal function within normal limits. Urinalysis with trace leukocytes and 3+occult blood.    CT "Punctate nonobstructive left renal stone measuring 0.5 cm. Mild dilatation LEFT renal collecting system and proximal LEFT ureter. Punctate calcifications in the LEFT pelvis posterior to the bladder could represent phleboliths or punctate calculus in the distal LEFT ureter. A recently passed ureteral calculus could also produce similar findings."    She reported symptomatic improvement with fluids and toradol in the ED. Expectant management with pain control and urology f/u. I discussed the care of this patient with my supervising MD. Braswell Attestation:   French #1: I performed the above scribed service and the documentation accurately describes the " services I performed. I attest to the accuracy of the note.    Attending Attestation:     Physician Attestation Statement for NP/PA:   I discussed this assessment and plan of this patient with the NP/PA, but I did not personally examine the patient. The face to face encounter was performed by the NP/PA.    Other NP/PA Attestation Additions:    History of Present Illness: 47 year old woman presents for evaluation of flank pain and UTI.         Physician Attestation for Scribe:  Physician Attestation Statement for Scribe #1: I, Dr. Garvin, reviewed documentation, as scribed by Agustín Fleming in my presence, and it is both accurate and complete.                 ED Course     Clinical Impression:   The encounter diagnosis was Renal stone.    Disposition:   Disposition: Discharged  Condition: Stable  Patient with non-obstructing renal stone and trace leukocytes. Will  Place on macrobid and give zofran / norco. Patient instructed to follow up with urology this week.                        SARA Hudson  08/21/17 0110

## 2017-08-20 NOTE — ED TRIAGE NOTES
Cheryle Olivares, a 47 y.o. female presents to the ED with complaints of possibly having a UTI, states she made AM appt with urgent care and called the nurse on call and she stated her symptoms, pt states she had new onset of pelvic and flank pain about 3 pm yesterday afternoon. Pt also states new onset of burning with urination around 3 pm yesterday. Denies blood in urine, denies hx of UTI, denies hx of kidney stones      Chief Complaint   Patient presents with    Flank Pain     seen at urgent care dx with uti- told to come by nurse on call - denies fever , hematuria     Review of patient's allergies indicates:  No Known Allergies  Past Medical History:   Diagnosis Date    Allergy     Anemia     History of migraine headaches     Migraine headache     Morbid obesity     Preeclampsia, severe 7/15/2013

## 2017-08-21 ENCOUNTER — OFFICE VISIT (OUTPATIENT)
Dept: UROLOGY | Facility: CLINIC | Age: 47
End: 2017-08-21
Attending: UROLOGY
Payer: COMMERCIAL

## 2017-08-21 VITALS
BODY MASS INDEX: 28.24 KG/M2 | WEIGHT: 165.44 LBS | HEIGHT: 64 IN | DIASTOLIC BLOOD PRESSURE: 79 MMHG | HEART RATE: 74 BPM | SYSTOLIC BLOOD PRESSURE: 138 MMHG

## 2017-08-21 DIAGNOSIS — N20.0 NEPHROLITHIASIS: Primary | ICD-10-CM

## 2017-08-21 LAB
BILIRUB SERPL-MCNC: ABNORMAL MG/DL
BLOOD URINE, POC: ABNORMAL
COLOR, POC UA: ABNORMAL
GLUCOSE UR QL STRIP: NORMAL
KETONES UR QL STRIP: ABNORMAL
LEUKOCYTE ESTERASE URINE, POC: ABNORMAL
NITRITE, POC UA: ABNORMAL
PH, POC UA: 5
PROTEIN, POC: ABNORMAL
SPECIFIC GRAVITY, POC UA: 1
UROBILINOGEN, POC UA: NORMAL

## 2017-08-21 PROCEDURE — 81002 URINALYSIS NONAUTO W/O SCOPE: CPT | Mod: S$GLB,,, | Performed by: UROLOGY

## 2017-08-21 PROCEDURE — 99244 OFF/OP CNSLTJ NEW/EST MOD 40: CPT | Mod: 25,S$GLB,, | Performed by: UROLOGY

## 2017-08-21 RX ORDER — SYRING-NEEDL,DISP,INSUL,0.3 ML 29 G X1/2"
296 SYRINGE, EMPTY DISPOSABLE MISCELLANEOUS ONCE
COMMUNITY
End: 2019-02-05

## 2017-08-21 RX ORDER — VITAMIN E 268 MG
400 CAPSULE ORAL DAILY
COMMUNITY
End: 2019-01-09

## 2017-08-21 NOTE — LETTER
August 21, 2017        Richard Garvin MD  [address]  [facility] [address]             [facility]  [address] [place] [address], [address]  [facility] [address]  Phone: [phone]   Patient: Cheryle Olivares   MR Number: [id number]   YOB: 1970   Date of Visit: 8/21/2017       Dear Dr. Garvin:    Thank you for referring Cheryle Olivares to me for evaluation. Below are the relevant portions of my assessment and plan of care.            If you have questions, please do not hesitate to call me. I look forward to following Cheryle along with you.    Sincerely,      Morteza Campos MD           CC  No Recipients

## 2017-08-21 NOTE — PROGRESS NOTES
"Subjective:      Cheryle Olivares is a 47 y.o. female who was referred by Dr. Garvin for evaluation of nephrolithiasis.      Urolithiasis  Patient complains of left flank pain with radiation to the abdomen. Symptoms began last week as pelvic pain ("cramping"), frequency and urgency. Onset of symptoms was abrupt starting 2 days ago with completely resolved course since that time. Patient describes the pain as cramping, intermittent and rated as moderate and severe. The patient has had no nausea/no vomiting and no diaphoresis. There has been no fever or chills. Risk factors for urolithiasis: none.    The following portions of the patient's history were reviewed and updated as appropriate: allergies, current medications, past family history, past medical history, past social history, past surgical history and problem list.    Review of Systems  Constitutional: no fever or chills  ENT: no nasal congestion or sore throat  Respiratory: no cough or shortness of breath  Cardiovascular: no chest pain or palpitations  Gastrointestinal: no nausea or vomiting, tolerating diet  Genitourinary: as per HPI  Hematologic/Lymphatic: no easy bruising or lymphadenopathy  Musculoskeletal: no arthralgias or myalgias  Neurological: no seizures or tremors  Behavioral/Psych: no auditory or visual hallucinations     Objective:   Vital Signs:/79 (BP Location: Left arm, Patient Position: Sitting, BP Method: Large (Automatic))   Pulse 74   Ht 5' 4" (1.626 m)   Wt 75 kg (165 lb 6.9 oz)   LMP 11/27/2015 (Exact Date)   BMI 28.40 kg/m²     Physical Exam   General: alert and oriented, no acute distress  Head: normocephalic, atraumatic  Neck: supple, no lymphadenopathy, normal ROM, no masses  Respiratory: Symmetric expansion, non-labored breathing  Skin: normal coloration and turgor, no rashes, no suspicious skin lesions noted  Neuro: alert and oriented x3, no gross deficits  Psych: normal judgment and insight, normal mood/affect and " non-anxious    Lab Review   Urinalysis demonstrates negative for all components  Lab Results   Component Value Date    WBC 10.97 08/20/2017    HGB 13.8 08/20/2017    HCT 39.4 08/20/2017    MCV 91 08/20/2017     08/20/2017     Lab Results   Component Value Date    CREATININE 0.8 08/20/2017    BUN 12 08/20/2017       Imaging (all images personally reviewed; agree with report below)  Results for orders placed during the hospital encounter of 08/20/17   CT Renal Stone Study ABD Pelvis WO    Narrative CT ABDOMEN AND PELVIS WITHOUT CONTRAST, RENAL STONE PROTOCOL    INDICATION:   Pain abdominal unspecified.    TECHNIQUE:   5mm axial images were acquired from the lung bases to the lesser trochanters without the administration of intravenous contrast. No oral contrast was administered. Low dose renal stone protocol was implemented.  Coronal and sagittal reconstructions were performed.    COMPARISON:   CT abdomen 4/12/2010.      FINDINGS:     HEART AND PERICARDIUM: Unremarkable.    LUNG BASES: Clear.    LIVER: Normal in size and attenuation. No focal hepatic lesions.    GALLBLADDER AND BILIARY TREE: Unremarkable.    SPLEEN: Unremarkable.    PANCREAS: Unremarkable.    ADRENALS: Unremarkable.    STOMACH: Unremarkable.    DUODENUM AND SMALL BOWEL: No evidence of obstruction or inflammation.    COLON AND RECTUM: Moderate volume of stool seen throughout the colon, suggestive of constipation. Appendix is not visualized but there is no inflammation in the expected region.    PERITONEAL SPACE: No free intraperitoneal air or fluid.    KIDNEYS/URETERS/BLADDER: Punctate left nonobstructive renal stone seen in the lower pole measuring 0.5 cm. Mild dilatation LEFT renal collecting system and proximal LEFT ureter. Punctate calcifications in the LEFT pelvis posterior to the bladder could represent phleboliths or punctate calculus in the distal LEFT ureter. Right kidney is unremarkable.    REPRODUCTIVE: Status post  hysterectomy.    VASCULATURE: Unremarkable.    LYMPH NODES: No evidence of lymphadenopathy.    OSSEOUS STRUCTURES: Sclerotic focus within the left iliac wing is likely a bony island.    SOFT TISSUES: Small fat containing umbilical hernia.    Impression Punctate nonobstructive left renal stone measuring 0.5 cm.    Mild dilatation LEFT renal collecting system and proximal LEFT ureter. Punctate calcifications in the LEFT pelvis posterior to the bladder could represent phleboliths or punctate calculus in the distal LEFT ureter. A recently passed ureteral calculus could also produce similar findings.    Moderate volume of stool throughout the colon which may reflect constipation.    Status post hysterectomy.    Small fat containing umbilical hernia.    ______________________________________     Electronically signed by resident: CHAYA PATEL MD  Date:     08/20/17  Time:    04:10            As the supervising and teaching physician, I personally reviewed the images and resident's interpretation and I agree with the findings.          Electronically signed by: LELA BARNARD MD  Date:     08/20/17  Time:    04:39           Assessment:     1. Nephrolithiasis        Plan:   1. Agree with likely passed small ureteral stone as etiology for recent pain  2. Discussed small renal stone. It is only 4mm by my measure. Will observe for now and FU w/ KUB.  Recommend general preventive measures, to include increased hydration, low Na diet, and increased citrus intake  Discussed indications to present to ED, including fever, intractable pain, and intractable nausea/vomitting     FU 6 mos w/ KUB

## 2017-11-29 ENCOUNTER — OFFICE VISIT (OUTPATIENT)
Dept: INTERNAL MEDICINE | Facility: CLINIC | Age: 47
End: 2017-11-29
Payer: COMMERCIAL

## 2017-11-29 VITALS
DIASTOLIC BLOOD PRESSURE: 91 MMHG | HEIGHT: 64 IN | HEART RATE: 83 BPM | BODY MASS INDEX: 27.31 KG/M2 | OXYGEN SATURATION: 97 % | SYSTOLIC BLOOD PRESSURE: 130 MMHG | WEIGHT: 160 LBS

## 2017-11-29 DIAGNOSIS — J01.20 SUBACUTE ETHMOIDAL SINUSITIS: Primary | ICD-10-CM

## 2017-11-29 PROCEDURE — 99214 OFFICE O/P EST MOD 30 MIN: CPT | Mod: S$GLB,,, | Performed by: INTERNAL MEDICINE

## 2017-11-29 PROCEDURE — 99999 PR PBB SHADOW E&M-EST. PATIENT-LVL III: CPT | Mod: PBBFAC,,, | Performed by: INTERNAL MEDICINE

## 2017-11-29 RX ORDER — ALPRAZOLAM 0.25 MG/1
0.25 TABLET ORAL 3 TIMES DAILY PRN
Refills: 0 | COMMUNITY
Start: 2017-11-03 | End: 2019-01-09 | Stop reason: SDUPTHER

## 2017-11-29 RX ORDER — METHYLPREDNISOLONE 4 MG/1
TABLET ORAL
Qty: 1 PACKAGE | Refills: 0 | Status: SHIPPED | OUTPATIENT
Start: 2017-11-29 | End: 2018-01-15

## 2017-11-29 RX ORDER — AZITHROMYCIN 250 MG/1
TABLET, FILM COATED ORAL
Qty: 6 TABLET | Refills: 0 | Status: SHIPPED | OUTPATIENT
Start: 2017-11-29 | End: 2017-12-04

## 2017-11-29 NOTE — PROGRESS NOTES
"Subjective:       Patient ID: Cheryle Olivares is a 47 y.o. female.    Chief Complaint: Facial Pain (pt complains of having some congestion & lots of facial pain (sinus) pressure.); Migraine (pt complains of having constant migraine headaches. pt currently takes Indomethacin & Excedrin to relieve. ); and Tachycardia (pt has had an elevated heart rate for the past week.)    HPI   46 yo F with hx of migraines presents for evaluation of facial pain, congestion, migraines, and fast heart rate.   Facial pain on right side. Not responding to indomethacin and excedrin migraine. Some ringing in ears.   Mom had sinus infection last week and was taking care of her. Also stomach bug yesterday.   BP has been slightly elevated on neurology visit. Heart racing intermittently.   No fever. Nose running somewhat.   Used OTC oxymetalozone spray w relief for a little while. Used afrin once yesterday w relief for an hour.   Review of Systems   Constitutional: Negative for activity change and unexpected weight change.   HENT: Positive for rhinorrhea. Negative for hearing loss and trouble swallowing.    Eyes: Negative for discharge and visual disturbance.   Respiratory: Negative for chest tightness and wheezing.    Cardiovascular: Positive for palpitations. Negative for chest pain.   Gastrointestinal: Negative for blood in stool, constipation, diarrhea and vomiting.   Endocrine: Negative for polydipsia and polyuria.   Genitourinary: Negative for difficulty urinating, dysuria, hematuria and menstrual problem.   Musculoskeletal: Negative for arthralgias, joint swelling and neck pain.   Neurological: Positive for headaches. Negative for weakness.   Psychiatric/Behavioral: Negative for confusion and dysphoric mood.       Objective:   BP (!) 130/91 (BP Location: Left arm, Patient Position: Sitting, BP Method: Medium (Manual))   Pulse 83   Ht 5' 4" (1.626 m)   Wt 72.6 kg (160 lb)   LMP 11/27/2015 (Exact Date)   SpO2 97%   BMI 27.46 " kg/m²      Physical Exam   Constitutional: She is oriented to person, place, and time. She appears well-developed and well-nourished. No distress.   HENT:   Head: Normocephalic and atraumatic.   Significant ttp right ethmoidal sinus, Erythematous nasal turbinates with mild clear-yellow rhinorrhea  Bilateral tm with fluid visualized posterior to TM but preserved light reflex   Cardiovascular: Normal rate and regular rhythm.    Pulmonary/Chest: Effort normal. No respiratory distress. She has no wheezes. She has no rales.   Neurological: She is alert and oriented to person, place, and time.   Skin: Skin is warm and dry. She is not diaphoretic.   Psychiatric: She has a normal mood and affect. Her behavior is normal.       Assessment:       1. Subacute ethmoidal sinusitis        Plan:       Cheryle was seen today for facial pain, migraine and tachycardia.    Diagnoses and all orders for this visit:    Subacute ethmoidal sinusitis  -     azithromycin (Z-MANUEL) 250 MG tablet; Take 2 tablets by mouth on day 1; Take 1 tablet by mouth on days 2-5  -     methylPREDNISolone (MEDROL, MANUEL,) 4 mg tablet; use as directed    rtc 2-4 months for BP check with PCP or PA

## 2017-11-30 ENCOUNTER — TELEPHONE (OUTPATIENT)
Dept: INTERNAL MEDICINE | Facility: CLINIC | Age: 47
End: 2017-11-30

## 2017-11-30 NOTE — TELEPHONE ENCOUNTER
----- Message from Rosalva Ramirez sent at 11/30/2017  8:47 AM CST -----  Contact: Pt [phone]  Pt called to speak to the nurse regarding her prescribed medication and complications that she is having and would like a call back today asap.    Pt can be reached at [phone].    Thanks

## 2017-11-30 NOTE — TELEPHONE ENCOUNTER
Pt will try to complete course of antibiotics. Pt would like to know would you be able to prescribe another antibiotic.  Please advise

## 2017-11-30 NOTE — TELEPHONE ENCOUNTER
Spoke with pt. Advised pt to stop the Azithromycin if symptoms worsen,but if manageable, pt can complete course. Verbalized understanding.

## 2017-11-30 NOTE — TELEPHONE ENCOUNTER
Spoke with pt. Pt verbalized that she was cleared of a stomach bug/virus yesterday. According to pt, all last night she'd been feeling nauseated & the feeling of almost regurgitating when she stands up & walks. Pt asks is this a side effect from the Azithromycin & should she stop it completely? Pt says she takes her next pill at noon. Please advise.

## 2017-11-30 NOTE — TELEPHONE ENCOUNTER
Azithromycin can cause side effects of nausea and diarrhea. If symptoms are severe she can stop the azithromycin but if manageable please complete course.

## 2017-12-03 ENCOUNTER — OFFICE VISIT (OUTPATIENT)
Dept: URGENT CARE | Facility: CLINIC | Age: 47
End: 2017-12-03
Payer: COMMERCIAL

## 2017-12-03 VITALS
WEIGHT: 160 LBS | HEIGHT: 64 IN | OXYGEN SATURATION: 98 % | TEMPERATURE: 98 F | SYSTOLIC BLOOD PRESSURE: 152 MMHG | DIASTOLIC BLOOD PRESSURE: 81 MMHG | RESPIRATION RATE: 18 BRPM | BODY MASS INDEX: 27.31 KG/M2 | HEART RATE: 81 BPM

## 2017-12-03 DIAGNOSIS — J32.9 SINUSITIS, UNSPECIFIED CHRONICITY, UNSPECIFIED LOCATION: Primary | ICD-10-CM

## 2017-12-03 DIAGNOSIS — F41.9 ANXIETY: ICD-10-CM

## 2017-12-03 PROCEDURE — 99214 OFFICE O/P EST MOD 30 MIN: CPT | Mod: S$GLB,,, | Performed by: PHYSICIAN ASSISTANT

## 2017-12-03 NOTE — PROGRESS NOTES
"Subjective:       Patient ID: Cheryle Olivares is a 47 y.o. female.    Vitals:  height is 5' 4" (1.626 m) and weight is 72.6 kg (160 lb). Her tympanic temperature is 98 °F (36.7 °C). Her blood pressure is 152/81 (abnormal) and her pulse is 81. Her respiration is 18 and oxygen saturation is 98%.     Chief Complaint: Sinus Problem (1 + week )    Present with headache, sinus pressure, anxiety and feeling uneasy, Started today.  She was treated last week for a sinus infection with z blaine and medrol blaine and has had no improvement.      Sinus Problem   This is a new problem. The current episode started in the past 7 days. The problem has been gradually improving since onset. There has been no fever. Associated symptoms include congestion and headaches. Pertinent negatives include no chills, coughing, ear pain, hoarse voice or sore throat.   Shortness of Breath   This is a new problem. The current episode started today. The problem occurs intermittently. The problem has been unchanged. Associated symptoms include headaches. Pertinent negatives include no abdominal pain, chest pain, ear pain, fever, leg swelling, sore throat, sputum production or wheezing. Exacerbated by: medication ? The patient has no known risk factors for DVT/PE. Treatments tried: z- blaine and medrol blaine excedrine migraine. The treatment provided no relief.     Review of Systems   Constitution: Negative for chills, fever and malaise/fatigue.   HENT: Positive for congestion. Negative for ear pain, hoarse voice and sore throat.    Eyes: Negative for discharge and redness.   Cardiovascular: Positive for palpitations. Negative for chest pain, dyspnea on exertion and leg swelling.   Respiratory: Negative for cough, sputum production and wheezing.    Musculoskeletal: Negative for myalgias.   Gastrointestinal: Negative for abdominal pain and nausea.   Neurological: Positive for headaches.       Objective:      Physical Exam   Constitutional: She is oriented " to person, place, and time. She appears well-developed and well-nourished.   HENT:   Head: Normocephalic and atraumatic.   Right Ear: Hearing, tympanic membrane, external ear and ear canal normal.   Left Ear: Hearing, tympanic membrane, external ear and ear canal normal.   Nose: Mucosal edema present. Right sinus exhibits no maxillary sinus tenderness and no frontal sinus tenderness. Left sinus exhibits no maxillary sinus tenderness and no frontal sinus tenderness.   Mouth/Throat: Uvula is midline and oropharynx is clear and moist.   Eyes: Conjunctivae are normal.   Neck: Normal range of motion. Neck supple. No thyromegaly present.   Cardiovascular: Normal rate and regular rhythm.  Exam reveals no gallop and no friction rub.    No murmur heard.  Pulmonary/Chest: Effort normal and breath sounds normal. She has no wheezes. She has no rales.   Musculoskeletal: Normal range of motion.   Lymphadenopathy:     She has no cervical adenopathy.   Neurological: She is alert and oriented to person, place, and time.   Skin: Skin is warm and dry. No rash noted. No erythema.   Psychiatric: Her behavior is normal. Judgment and thought content normal. Her mood appears anxious.   Nursing note and vitals reviewed.      Assessment:       1. Sinusitis, unspecified chronicity, unspecified location    2. Anxiety        Plan:         Sinusitis, unspecified chronicity, unspecified location    Anxiety      Cheryle was seen today for sinus problem.    Diagnoses and all orders for this visit:    Sinusitis, unspecified chronicity, unspecified location    Anxiety      Patient Instructions   - Rest.    - Drink plenty of fluids.    - Tylenol or Ibuprofen as directed as needed for fever/pain.    - Take over-the-counter claritin, zyrtec, allegra, or xyzal as directed.  - Use over the counter Flonase as directed for sinus congestion and postnasal drip.  - use nasal saline prior to Flonase.   - Follow up with your PCP or specialty clinic as directed in  the next 1-2 weeks if not improved or as needed.  You can call [phone] to schedule an appointment with the appropriate provider.    - Go to the ED if your symptoms worsen.    Sinusitis (No Antibiotics)    The sinuses are air-filled spaces within the bones of the face. They connect to the inside of the nose. Sinusitis is an inflammation of the tissue lining the sinus cavity. Sinus inflammation can occur during a cold. It can also be due to allergies to pollens and other particles in the air. It can cause symptoms such as sinus congestion, headache, sore throat, facial swelling and fullness. It may also cause a low-grade fever. No infection is present, and no antibiotic treatment is needed.  Home care  · Drink plenty of water, hot tea, and other liquids. This may help thin mucus. It also may promote sinus drainage.  · Heat may help soothe painful areas of the face. Use a towel soaked in hot water. Or,  the shower and direct the hot spray onto your face. Using a vaporizer along with a menthol rub at night may also help.   · An expectorant containing guaifenesin may help thin the mucus and promote drainage from the sinuses.  · Over-the-counter decongestants may be used unless a similar medicine was prescribed. Nasal sprays work the fastest. Use one that contains phenylephrine or oxymetazoline. First blow the nose gently. Then use the spray. Do not use these medicines more often than directed on the label or symptoms may get worse. You may also use tablets containing pseudoephedrine. Avoid products that combine ingredients, because side effects may be increased. Read labels. You can also ask the pharmacist for help. (NOTE: Persons with high blood pressure should not use decongestants. They can raise blood pressure.)  · Over-the-counter antihistamines may help if allergies contributed to your sinusitis.    · Use acetaminophen or ibuprofen to control pain, unless another pain medicine was prescribed. (If you  have chronic liver or kidney disease or ever had a stomach ulcer, talk with your doctor before using these medicines. Aspirin should never be used in anyone under 18 years of age who is ill with a fever. It may cause severe liver damage.)  · Use nasal rinses or irrigation as instructed by your health care provider.  · Don't smoke. This can worsen symptoms.  Follow-up care  Follow up with your healthcare provider or our staff if you are not improving within the next week.  When to seek medical advice  Call your healthcare provider if any of these occur:  · Green or yellow discharge from the nose or into the throat  · Facial pain or headache becoming more severe  · Stiff neck  · Unusual drowsiness or confusion  · Swelling of the forehead or eyelids  · Vision problems, including blurred or double vision  · Fever of 100.4ºF (38ºC) or higher, or as directed by your healthcare provider  · Seizure  · Breathing problems  · Symptoms not resolving within 10 days  Date Last Reviewed: 4/13/2015  © 8831-3199 [employer]. [address] [place] [address]. All rights reserved. This information is not intended as a substitute for professional medical care. Always follow your healthcare professional's instructions.        Anxiety Reaction  Anxiety is the feeling we all get when we think something bad might happen. It is a normal response to stress and usually causes only a mild reaction. When anxiety becomes more severe, it can interfere with daily life. In some cases, you may not even be aware of what it is youre anxious about. There may also be a genetic link or it may be a learned behavior in the home.  Both psychological and physical triggers cause stress reaction. It's often a response to fear or emotional stress, real or imagined. This stress may come from home, family, work, or social relationships.  During an anxiety reaction, you may feel:  · Helpless  · Nervous  · Depressed  · Irritable  Your body  may show signs of anxiety in many ways. You may experience:  · Dry mouth  · Shakiness  · Dizziness  · Weakness  · Trouble breathing  · Breathing fast (hyperventilating)  · Chest pressure  · Sweating  · Headache  · Nausea  · Diarrhea  · Tiredness  · Inability to sleep  · Sexual problems  Home care  · Try to locate the sources of stress in your life. They may not be obvious. These may include:  ¨ Daily hassles of life (traffic jams, missed appointments, car troubles, etc.)  ¨ Major life changes, both good (new baby, job promotion) and bad (loss of job, loss of loved one)  ¨ Overload: feeling that you have too many responsibilities and can't take care of all of them at once  ¨ Feeling helpless, feeling that your problems are beyond what youre able to solve  · Notice how your body reacts to stress. Learn to listen to your body signals. This will help you take action before the stress becomes severe.  · When you can, do something about the source of your stress. (Avoid hassles, limit the amount of change that happens in your life at one time and take a break when you feel overloaded).  · Unfortunately, many stressful situations can't be avoided. It is necessary to learn how to better manage stress. There are many proven methods that will reduce your anxiety. These include simple things like exercise, good nutrition and adequate rest. Also, there are certain techniques that are helpful:  ¨ Relaxation  ¨ Breathing exercises  ¨ Visualization  ¨ Biofeedback  ¨ Meditation  For more information about this, consult your doctor or go to a local bookstore and review the many books and tapes available on this subject.  Follow-up care  If you feel that your anxiety is not responding to self-help measures, contact your doctor or make an appointment with a counselor. You may need short-term psychological counseling and temporary medicine to help you manage stress.  Call 911  Call your healthcare provider right away if any of these  occur:  · Trouble breathing  · Confusion  · Drowsiness or trouble wakening  · Fainting or loss of consciousness  · Rapid heart rate  · Seizure  · New chest pain that becomes more severe, lasts longer, or spreads into your shoulder, arm, neck, jaw, or back  When to seek medical advice  Call your healthcare provider right away if any of these occur:  · Your symptoms get worse  · Severe headache not relieved by rest and mild pain reliever  Date Last Reviewed: 9/29/2015  © 2632-4125 [employer]. [address] [place] [address], [place] [address]. All rights reserved. This information is not intended as a substitute for professional medical care. Always follow your healthcare professional's instructions.

## 2017-12-03 NOTE — PATIENT INSTRUCTIONS
- Rest.    - Drink plenty of fluids.    - Tylenol or Ibuprofen as directed as needed for fever/pain.    - Take over-the-counter claritin, zyrtec, allegra, or xyzal as directed.  - Use over the counter Flonase as directed for sinus congestion and postnasal drip.  - use nasal saline prior to Flonase.   - Follow up with your PCP or specialty clinic as directed in the next 1-2 weeks if not improved or as needed.  You can call [phone] to schedule an appointment with the appropriate provider.    - Go to the ED if your symptoms worsen.    Sinusitis (No Antibiotics)    The sinuses are air-filled spaces within the bones of the face. They connect to the inside of the nose. Sinusitis is an inflammation of the tissue lining the sinus cavity. Sinus inflammation can occur during a cold. It can also be due to allergies to pollens and other particles in the air. It can cause symptoms such as sinus congestion, headache, sore throat, facial swelling and fullness. It may also cause a low-grade fever. No infection is present, and no antibiotic treatment is needed.  Home care  · Drink plenty of water, hot tea, and other liquids. This may help thin mucus. It also may promote sinus drainage.  · Heat may help soothe painful areas of the face. Use a towel soaked in hot water. Or,  the shower and direct the hot spray onto your face. Using a vaporizer along with a menthol rub at night may also help.   · An expectorant containing guaifenesin may help thin the mucus and promote drainage from the sinuses.  · Over-the-counter decongestants may be used unless a similar medicine was prescribed. Nasal sprays work the fastest. Use one that contains phenylephrine or oxymetazoline. First blow the nose gently. Then use the spray. Do not use these medicines more often than directed on the label or symptoms may get worse. You may also use tablets containing pseudoephedrine. Avoid products that combine ingredients, because side effects may  be increased. Read labels. You can also ask the pharmacist for help. (NOTE: Persons with high blood pressure should not use decongestants. They can raise blood pressure.)  · Over-the-counter antihistamines may help if allergies contributed to your sinusitis.    · Use acetaminophen or ibuprofen to control pain, unless another pain medicine was prescribed. (If you have chronic liver or kidney disease or ever had a stomach ulcer, talk with your doctor before using these medicines. Aspirin should never be used in anyone under 18 years of age who is ill with a fever. It may cause severe liver damage.)  · Use nasal rinses or irrigation as instructed by your health care provider.  · Don't smoke. This can worsen symptoms.  Follow-up care  Follow up with your healthcare provider or our staff if you are not improving within the next week.  When to seek medical advice  Call your healthcare provider if any of these occur:  · Green or yellow discharge from the nose or into the throat  · Facial pain or headache becoming more severe  · Stiff neck  · Unusual drowsiness or confusion  · Swelling of the forehead or eyelids  · Vision problems, including blurred or double vision  · Fever of 100.4ºF (38ºC) or higher, or as directed by your healthcare provider  · Seizure  · Breathing problems  · Symptoms not resolving within 10 days  Date Last Reviewed: 4/13/2015  © 4096-0780 [employer]. [address] [place] [address], [place] [address]. All rights reserved. This information is not intended as a substitute for professional medical care. Always follow your healthcare professional's instructions.        Anxiety Reaction  Anxiety is the feeling we all get when we think something bad might happen. It is a normal response to stress and usually causes only a mild reaction. When anxiety becomes more severe, it can interfere with daily life. In some cases, you may not even be aware of what it is youre anxious about. There may also be a  genetic link or it may be a learned behavior in the home.  Both psychological and physical triggers cause stress reaction. It's often a response to fear or emotional stress, real or imagined. This stress may come from home, family, work, or social relationships.  During an anxiety reaction, you may feel:  · Helpless  · Nervous  · Depressed  · Irritable  Your body may show signs of anxiety in many ways. You may experience:  · Dry mouth  · Shakiness  · Dizziness  · Weakness  · Trouble breathing  · Breathing fast (hyperventilating)  · Chest pressure  · Sweating  · Headache  · Nausea  · Diarrhea  · Tiredness  · Inability to sleep  · Sexual problems  Home care  · Try to locate the sources of stress in your life. They may not be obvious. These may include:  ¨ Daily hassles of life (traffic jams, missed appointments, car troubles, etc.)  ¨ Major life changes, both good (new baby, job promotion) and bad (loss of job, loss of loved one)  ¨ Overload: feeling that you have too many responsibilities and can't take care of all of them at once  ¨ Feeling helpless, feeling that your problems are beyond what youre able to solve  · Notice how your body reacts to stress. Learn to listen to your body signals. This will help you take action before the stress becomes severe.  · When you can, do something about the source of your stress. (Avoid hassles, limit the amount of change that happens in your life at one time and take a break when you feel overloaded).  · Unfortunately, many stressful situations can't be avoided. It is necessary to learn how to better manage stress. There are many proven methods that will reduce your anxiety. These include simple things like exercise, good nutrition and adequate rest. Also, there are certain techniques that are helpful:  ¨ Relaxation  ¨ Breathing exercises  ¨ Visualization  ¨ Biofeedback  ¨ Meditation  For more information about this, consult your doctor or go to a local bookstore and review the  many books and tapes available on this subject.  Follow-up care  If you feel that your anxiety is not responding to self-help measures, contact your doctor or make an appointment with a counselor. You may need short-term psychological counseling and temporary medicine to help you manage stress.  Call 911  Call your healthcare provider right away if any of these occur:  · Trouble breathing  · Confusion  · Drowsiness or trouble wakening  · Fainting or loss of consciousness  · Rapid heart rate  · Seizure  · New chest pain that becomes more severe, lasts longer, or spreads into your shoulder, arm, neck, jaw, or back  When to seek medical advice  Call your healthcare provider right away if any of these occur:  · Your symptoms get worse  · Severe headache not relieved by rest and mild pain reliever  Date Last Reviewed: 9/29/2015  © 3312-2623 [employer]. [address] [place] [address], [place] [address]. All rights reserved. This information is not intended as a substitute for professional medical care. Always follow your healthcare professional's instructions.

## 2017-12-27 ENCOUNTER — OFFICE VISIT (OUTPATIENT)
Dept: INTERNAL MEDICINE | Facility: CLINIC | Age: 47
End: 2017-12-27
Payer: COMMERCIAL

## 2017-12-27 VITALS
BODY MASS INDEX: 27.59 KG/M2 | DIASTOLIC BLOOD PRESSURE: 88 MMHG | SYSTOLIC BLOOD PRESSURE: 126 MMHG | WEIGHT: 161.63 LBS | HEART RATE: 66 BPM | HEIGHT: 64 IN | OXYGEN SATURATION: 98 %

## 2017-12-27 DIAGNOSIS — G43.101 MIGRAINE WITH AURA AND WITH STATUS MIGRAINOSUS, NOT INTRACTABLE: ICD-10-CM

## 2017-12-27 DIAGNOSIS — R03.0 ELEVATED BLOOD PRESSURE READING: Primary | ICD-10-CM

## 2017-12-27 PROCEDURE — 99999 PR PBB SHADOW E&M-EST. PATIENT-LVL IV: CPT | Mod: PBBFAC,,, | Performed by: NURSE PRACTITIONER

## 2017-12-27 PROCEDURE — 99213 OFFICE O/P EST LOW 20 MIN: CPT | Mod: S$GLB,,, | Performed by: NURSE PRACTITIONER

## 2017-12-27 RX ORDER — FLUTICASONE PROPIONATE 50 MCG
1 SPRAY, SUSPENSION (ML) NASAL DAILY
COMMUNITY
End: 2019-01-09

## 2017-12-27 NOTE — PROGRESS NOTES
INTERNAL MEDICINE PROGRESS NOTE    CHIEF COMPLAINT     Chief Complaint   Patient presents with    Follow-up     4 wk BP       HPI     Cheryle Olivares is a 47 y.o. female with allergies, anemia, migraines, and obesity who presents for a 4 week follow up visit today for BP check.     She is an established pt of Dr. Krueger     Here for BP check - was seen in urgent care 12/3/2017. BP elevated at that time. Also high readings at neurology appointments. Started taking garlic pills per advise of Neurology. Also exercising   Today BP at goal. Pt with headache - see headache HPI   Mother has h/o HTN. Pt has h/o post-partum pre-eclampsia     AR- taking zyrtec and flonase  Sinusitis 4 weeks ago- treated with zpack and medrol dose pack.     Migraines treats with Excedrin and indomethacin - followed by neurology. Takes topamax daily. Has daily headaches.     Sinusitis- stopped medrol dose pack 2/2 anxiety and nausea. Started flonase instead. Stopped zpack also     Past Medical History:  Past Medical History:   Diagnosis Date    Allergy     Anemia     History of migraine headaches     Migraine headache     Morbid obesity     Preeclampsia, severe 7/15/2013       Home Medications:  Prior to Admission medications    Medication Sig Start Date End Date Taking? Authorizing Provider   ALPRAZolam (XANAX) 0.25 MG tablet Take 0.25 mg by mouth 3 (three) times daily as needed. 11/3/17   Historical Provider, MD   cetirizine (ZYRTEC) 10 MG tablet Take 10 mg by mouth once daily.      Historical Provider, MD   ibuprofen (ADVIL,MOTRIN) 800 MG tablet Take 800 mg by mouth every 4 to 6 hours as needed. 5/15/17   Historical Provider, MD   indomethacin (INDOCIN) 25 MG capsule Take 1 capsule (25 mg total) by mouth every 6 (six) hours as needed. 12/11/15   Timmy Colon MD   magnesium citrate (CITRATE OF MAGNESIA) solution Take 296 mLs by mouth once.    Historical Provider, MD   methylPREDNISolone (MEDROL, MANUEL,) 4 mg tablet use as  "directed 11/29/17   Maria E Orona MD   multivitamin capsule Take 1 capsule by mouth once daily.    Historical Provider, MD   ondansetron (ZOFRAN) 4 MG tablet Take 1 tablet (4 mg total) by mouth every 6 (six) hours. 8/20/17   Anant Delgado MD   topiramate (TOPAMAX) 25 MG tablet Take 25 mg by mouth once daily.  10/27/15   Historical Provider, MD   vitamin E 400 UNIT capsule Take 400 Units by mouth once daily.    Historical Provider, MD       Review of Systems:  Review of Systems   Constitutional: Negative for chills, fatigue and fever.   HENT: Negative for congestion, postnasal drip, rhinorrhea, sinus pain, sinus pressure, sneezing and voice change.    Respiratory: Negative for cough, shortness of breath and wheezing.    Cardiovascular: Negative for chest pain and palpitations.   Gastrointestinal: Negative for abdominal pain, constipation, diarrhea, nausea and vomiting.   Skin: Negative for rash.   Neurological: Positive for headaches. Negative for dizziness and light-headedness.       Health Maintainence:   Immunizations:  Health Maintenance       Date Due Completion Date    Influenza Vaccine 08/01/2017 11/16/2012    Mammogram 05/22/2018 5/22/2017    Lipid Panel 03/11/2019 3/11/2014    TETANUS VACCINE 04/22/2023 4/22/2013           PHYSICAL EXAM     /88 (BP Location: Left arm, Patient Position: Sitting, BP Method: Large (Manual))   Pulse 66   Ht 5' 4" (1.626 m)   Wt 73.3 kg (161 lb 9.6 oz)   LMP 11/27/2015 (Exact Date)   SpO2 98%   BMI 27.74 kg/m²     Physical Exam   Constitutional: She is oriented to person, place, and time. She appears well-developed and well-nourished.   HENT:   Head: Normocephalic.   Right Ear: External ear normal.   Left Ear: External ear normal.   Nose: Nose normal.   Mouth/Throat: Oropharynx is clear and moist. No oropharyngeal exudate.   Eyes: Pupils are equal, round, and reactive to light.   Neck: Neck supple. No JVD present. No tracheal deviation present. No " thyromegaly present.   Cardiovascular: Normal rate, regular rhythm, normal heart sounds and intact distal pulses.  Exam reveals no gallop and no friction rub.    No murmur heard.  Pulmonary/Chest: Effort normal and breath sounds normal. No respiratory distress. She has no wheezes. She has no rales.   Abdominal: Soft. Bowel sounds are normal. She exhibits no distension. There is no tenderness.   Musculoskeletal: Normal range of motion. She exhibits no edema or tenderness.   Lymphadenopathy:     She has no cervical adenopathy.   Neurological: She is alert and oriented to person, place, and time.   Skin: Skin is warm and dry. No rash noted.   Psychiatric: She has a normal mood and affect. Her behavior is normal.   Vitals reviewed.      LABS     Lab Results   Component Value Date    HGBA1C 5.3 11/15/2012     CMP  Sodium   Date Value Ref Range Status   08/20/2017 138 136 - 145 mmol/L Final     Potassium   Date Value Ref Range Status   08/20/2017 3.1 (L) 3.5 - 5.1 mmol/L Final     Chloride   Date Value Ref Range Status   08/20/2017 103 95 - 110 mmol/L Final     CO2   Date Value Ref Range Status   08/20/2017 26 23 - 29 mmol/L Final     Glucose   Date Value Ref Range Status   08/20/2017 92 70 - 110 mg/dL Final     BUN, Bld   Date Value Ref Range Status   08/20/2017 12 6 - 20 mg/dL Final     Creatinine   Date Value Ref Range Status   08/20/2017 0.8 0.5 - 1.4 mg/dL Final     Calcium   Date Value Ref Range Status   08/20/2017 9.1 8.7 - 10.5 mg/dL Final     Total Protein   Date Value Ref Range Status   08/20/2017 7.3 6.0 - 8.4 g/dL Final     Albumin   Date Value Ref Range Status   08/20/2017 4.0 3.5 - 5.2 g/dL Final     Total Bilirubin   Date Value Ref Range Status   08/20/2017 0.4 0.1 - 1.0 mg/dL Final     Comment:     For infants and newborns, interpretation of results should be based  on gestational age, weight and in agreement with clinical  observations.  Premature Infant recommended reference ranges:  Up to 24  hours.............<8.0 mg/dL  Up to 48 hours............<12.0 mg/dL  3-5 days..................<15.0 mg/dL  6-29 days.................<15.0 mg/dL       Alkaline Phosphatase   Date Value Ref Range Status   08/20/2017 81 55 - 135 U/L Final     AST   Date Value Ref Range Status   08/20/2017 21 10 - 40 U/L Final     ALT   Date Value Ref Range Status   08/20/2017 12 10 - 44 U/L Final     Anion Gap   Date Value Ref Range Status   08/20/2017 9 8 - 16 mmol/L Final     eGFR if    Date Value Ref Range Status   08/20/2017 >60.0 >60 mL/min/1.73 m^2 Final     eGFR if non    Date Value Ref Range Status   08/20/2017 >60.0 >60 mL/min/1.73 m^2 Final     Comment:     Calculation used to obtain the estimated glomerular filtration  rate (eGFR) is the CKD-EPI equation. Since race is unknown   in our information system, the eGFR values for   -American and Non--American patients are given   for each creatinine result.       Lab Results   Component Value Date    WBC 10.97 08/20/2017    HGB 13.8 08/20/2017    HCT 39.4 08/20/2017    MCV 91 08/20/2017     08/20/2017     Lab Results   Component Value Date    CHOL 210 (H) 03/11/2014    CHOL 213 (H) 12/20/2011    CHOL 206 (H) 06/30/2010     Lab Results   Component Value Date    HDL 69 03/11/2014    HDL 73 12/20/2011    HDL 91 (H) 06/30/2010     Lab Results   Component Value Date    LDLCALC 127.8 03/11/2014    LDLCALC 130.0 12/20/2011    LDLCALC 104.6 06/30/2010     Lab Results   Component Value Date    TRIG 66 03/11/2014    TRIG 49 12/20/2011    TRIG 52 06/30/2010     Lab Results   Component Value Date    CHOLHDL 32.9 03/11/2014    CHOLHDL 34.3 12/20/2011    CHOLHDL 44.2 06/30/2010     Lab Results   Component Value Date    TSH 2.523 11/30/2015       ASSESSMENT/PLAN     Cheryle Olivares is a 47 y.o. female with  Past Medical History:   Diagnosis Date    Allergy     Anemia     History of migraine headaches     Migraine headache      Morbid obesity     Preeclampsia, severe 7/15/2013     Elevated blood pressure reading- at goal today. advsied healthy diet and exercise. May cont garlic supplement     Migraine with aura and with status migrainosus, not intractable- stable. Cont Topamax daily and indomethacin and Excedrin migraine as needed           Follow up as needed     Patient education provided from Jasmin. Patient was counseled on when and how to seek emergent care.       Susan HENRY, GENNA, FNP-c   Department of Internal Medicine - [facility]  9:27 AM

## 2018-01-15 ENCOUNTER — OFFICE VISIT (OUTPATIENT)
Dept: OBSTETRICS AND GYNECOLOGY | Facility: CLINIC | Age: 48
End: 2018-01-15
Payer: COMMERCIAL

## 2018-01-15 VITALS
WEIGHT: 162.5 LBS | HEIGHT: 64 IN | SYSTOLIC BLOOD PRESSURE: 120 MMHG | DIASTOLIC BLOOD PRESSURE: 80 MMHG | BODY MASS INDEX: 27.74 KG/M2

## 2018-01-15 DIAGNOSIS — Z01.419 VISIT FOR GYNECOLOGIC EXAMINATION: Primary | ICD-10-CM

## 2018-01-15 DIAGNOSIS — Z12.39 BREAST CANCER SCREENING: ICD-10-CM

## 2018-01-15 PROCEDURE — 99396 PREV VISIT EST AGE 40-64: CPT | Mod: S$GLB,,, | Performed by: OBSTETRICS & GYNECOLOGY

## 2018-01-15 PROCEDURE — 99999 PR PBB SHADOW E&M-EST. PATIENT-LVL III: CPT | Mod: PBBFAC,,, | Performed by: OBSTETRICS & GYNECOLOGY

## 2018-02-23 ENCOUNTER — OFFICE VISIT (OUTPATIENT)
Dept: UROLOGY | Facility: CLINIC | Age: 48
End: 2018-02-23
Payer: COMMERCIAL

## 2018-02-23 ENCOUNTER — HOSPITAL ENCOUNTER (OUTPATIENT)
Dept: RADIOLOGY | Facility: HOSPITAL | Age: 48
Discharge: HOME | End: 2018-02-23
Attending: UROLOGY
Payer: COMMERCIAL

## 2018-02-23 VITALS
WEIGHT: 162 LBS | HEART RATE: 80 BPM | SYSTOLIC BLOOD PRESSURE: 118 MMHG | HEIGHT: 64 IN | DIASTOLIC BLOOD PRESSURE: 83 MMHG | BODY MASS INDEX: 27.66 KG/M2

## 2018-02-23 DIAGNOSIS — N20.0 NEPHROLITHIASIS: Primary | ICD-10-CM

## 2018-02-23 DIAGNOSIS — N20.0 NEPHROLITHIASIS: ICD-10-CM

## 2018-02-23 LAB
BILIRUB SERPL-MCNC: NORMAL MG/DL
BLOOD URINE, POC: NORMAL
COLOR, POC UA: YELLOW
GLUCOSE UR QL STRIP: NORMAL
KETONES UR QL STRIP: NORMAL
LEUKOCYTE ESTERASE URINE, POC: NORMAL
NITRITE, POC UA: NORMAL
PH, POC UA: 8
PROTEIN, POC: NORMAL
SPECIFIC GRAVITY, POC UA: 1.01
UROBILINOGEN, POC UA: NORMAL

## 2018-02-23 PROCEDURE — 3008F BODY MASS INDEX DOCD: CPT | Mod: S$GLB,,, | Performed by: UROLOGY

## 2018-02-23 PROCEDURE — 81002 URINALYSIS NONAUTO W/O SCOPE: CPT | Mod: S$GLB,,, | Performed by: UROLOGY

## 2018-02-23 PROCEDURE — 74018 RADEX ABDOMEN 1 VIEW: CPT | Mod: TC,PO

## 2018-02-23 PROCEDURE — 74018 RADEX ABDOMEN 1 VIEW: CPT | Mod: 26,,, | Performed by: RADIOLOGY

## 2018-02-23 PROCEDURE — 99213 OFFICE O/P EST LOW 20 MIN: CPT | Mod: 25,S$GLB,, | Performed by: UROLOGY

## 2018-02-23 PROCEDURE — 99999 PR PBB SHADOW E&M-EST. PATIENT-LVL III: CPT | Mod: PBBFAC,,, | Performed by: UROLOGY

## 2018-02-23 NOTE — PROGRESS NOTES
"Subjective:      Cheryle Olivares is a 47 y.o. female who returns today regarding her nephrolithiasis.    She likely passed a small stone last year. CT then also showed 4mm left renal stone, which she is here to FU w/ KUB. Has some intermittent low back pain but no signs of colic.    The following portions of the patient's history were reviewed and updated as appropriate: allergies, current medications, past family history, past medical history, past social history, past surgical history and problem list.    Review of Systems  A comprehensive multipoint review of systems was negative except as otherwise stated in the HPI.     Objective:   Vitals: /83 (BP Location: Left arm, Patient Position: Sitting, BP Method: Medium (Automatic))   Pulse 80   Ht 5' 4" (1.626 m)   Wt 73.5 kg (162 lb)   LMP 11/27/2015 (Exact Date)   BMI 27.81 kg/m²     Physical Exam   General: alert and oriented, no acute distress  Respiratory: Symmetric expansion, non-labored breathing  Neuro: no gross deficits  Psych: normal judgment and insight, normal mood/affect and non-anxious    Lab Review   Urinalysis demonstrates negative for all components  Lab Results   Component Value Date    WBC 10.97 08/20/2017    HGB 13.8 08/20/2017    HCT 39.4 08/20/2017    MCV 91 08/20/2017     08/20/2017     Lab Results   Component Value Date    CREATININE 0.8 08/20/2017    BUN 12 08/20/2017       Imaging (all images personally reviewed; agree with report below)  Results for orders placed during the hospital encounter of 02/23/18   X-Ray Abdomen AP 1 View    Narrative One view: There is bilateral nephrolithiasis left greater than right. No obstruction, ileus, or perforation seen.      Electronically signed by: SIOBHAN SELBY MD  Date:     02/23/18  Time:    08:46          Assessment and Plan:   1. Nephrolithiasis  -- Stable renal stone today  -- FU 6 mos w/ KUB       "

## 2018-03-26 ENCOUNTER — OFFICE VISIT (OUTPATIENT)
Dept: INTERNAL MEDICINE | Facility: CLINIC | Age: 48
End: 2018-03-26
Payer: COMMERCIAL

## 2018-03-26 VITALS
BODY MASS INDEX: 28.15 KG/M2 | OXYGEN SATURATION: 99 % | WEIGHT: 164.88 LBS | HEIGHT: 64 IN | TEMPERATURE: 98 F | SYSTOLIC BLOOD PRESSURE: 112 MMHG | DIASTOLIC BLOOD PRESSURE: 64 MMHG | HEART RATE: 65 BPM

## 2018-03-26 DIAGNOSIS — R10.32 LEFT INGUINAL PAIN: Primary | ICD-10-CM

## 2018-03-26 PROCEDURE — 99999 PR PBB SHADOW E&M-EST. PATIENT-LVL IV: CPT | Mod: PBBFAC,,, | Performed by: NURSE PRACTITIONER

## 2018-03-26 PROCEDURE — 99213 OFFICE O/P EST LOW 20 MIN: CPT | Mod: S$GLB,,, | Performed by: NURSE PRACTITIONER

## 2018-03-26 PROCEDURE — 3078F DIAST BP <80 MM HG: CPT | Mod: CPTII,S$GLB,, | Performed by: NURSE PRACTITIONER

## 2018-03-26 PROCEDURE — 3074F SYST BP LT 130 MM HG: CPT | Mod: CPTII,S$GLB,, | Performed by: NURSE PRACTITIONER

## 2018-03-26 NOTE — PROGRESS NOTES
Subjective:       Patient ID: Cheryle Olivares is a 48 y.o. female.    Chief Complaint: Groin Pain    Ms. Olivares is a new patient to me.  She presents today for left sided groin pain x several weeks. The pain is mild.  At first she believed it was a pulled muscle, but she rested the area and found no improvement.       Review of Systems   Constitutional: Negative for fever.   HENT: Negative for facial swelling.    Eyes: Negative for visual disturbance.   Respiratory: Negative for shortness of breath.    Cardiovascular: Negative for chest pain.   Gastrointestinal: Negative for diarrhea, nausea and vomiting.   Genitourinary: Negative for dysuria.   Musculoskeletal: Negative for gait problem.   Skin: Negative for rash.   Psychiatric/Behavioral: Negative for confusion.       Objective:      Physical Exam   Constitutional: She is oriented to person, place, and time. She appears well-developed and well-nourished. No distress.   Cardiovascular: Normal rate, regular rhythm and normal heart sounds.    Pulmonary/Chest: Effort normal and breath sounds normal. No respiratory distress.   Abdominal:       Lymphadenopathy:        Left: No inguinal adenopathy present.   Neurological: She is alert and oriented to person, place, and time.   Skin: Skin is warm and dry. She is not diaphoretic.   Psychiatric: She has a normal mood and affect. Her behavior is normal.   Nursing note and vitals reviewed.      Assessment:       1. Left inguinal pain        Plan:   1. Left inguinal pain  - Open to PT is u/s is negative for hernia  - US Abdomen Limited; Future      Pt has been given instructions populated from [employer] database and has verbalized understanding of the after visit summary and information contained wherein.    Follow up with a primary care provider. May go to ER for acute shortness of breath, lightheadedness, fever, or any other emergent complaints or changes in condition.

## 2018-05-16 ENCOUNTER — HOSPITAL ENCOUNTER (OUTPATIENT)
Dept: RADIOLOGY | Facility: HOSPITAL | Age: 48
Discharge: HOME | End: 2018-05-16
Attending: OBSTETRICS & GYNECOLOGY
Payer: COMMERCIAL

## 2018-05-16 DIAGNOSIS — Z12.39 BREAST CANCER SCREENING: ICD-10-CM

## 2018-05-16 PROCEDURE — 77063 BREAST TOMOSYNTHESIS BI: CPT | Mod: 26,,, | Performed by: RADIOLOGY

## 2018-05-16 PROCEDURE — 77067 SCR MAMMO BI INCL CAD: CPT | Mod: 26,,, | Performed by: RADIOLOGY

## 2018-05-16 PROCEDURE — 77067 SCR MAMMO BI INCL CAD: CPT | Mod: TC

## 2018-08-24 ENCOUNTER — HOSPITAL ENCOUNTER (OUTPATIENT)
Dept: RADIOLOGY | Facility: HOSPITAL | Age: 48
Discharge: HOME | End: 2018-08-24
Attending: UROLOGY
Payer: COMMERCIAL

## 2018-08-24 ENCOUNTER — OFFICE VISIT (OUTPATIENT)
Dept: UROLOGY | Facility: CLINIC | Age: 48
End: 2018-08-24
Payer: COMMERCIAL

## 2018-08-24 VITALS
BODY MASS INDEX: 28.91 KG/M2 | DIASTOLIC BLOOD PRESSURE: 73 MMHG | HEART RATE: 69 BPM | WEIGHT: 169.31 LBS | HEIGHT: 64 IN | SYSTOLIC BLOOD PRESSURE: 113 MMHG

## 2018-08-24 DIAGNOSIS — N20.0 NEPHROLITHIASIS: Primary | ICD-10-CM

## 2018-08-24 DIAGNOSIS — N20.0 NEPHROLITHIASIS: ICD-10-CM

## 2018-08-24 LAB
BILIRUB SERPL-MCNC: NORMAL MG/DL
BLOOD URINE, POC: NORMAL
COLOR, POC UA: YELLOW
GLUCOSE UR QL STRIP: NORMAL
KETONES UR QL STRIP: NORMAL
LEUKOCYTE ESTERASE URINE, POC: NORMAL
NITRITE, POC UA: NORMAL
PH, POC UA: 6
PROTEIN, POC: NORMAL
SPECIFIC GRAVITY, POC UA: 1.01
UROBILINOGEN, POC UA: NORMAL

## 2018-08-24 PROCEDURE — 81002 URINALYSIS NONAUTO W/O SCOPE: CPT | Mod: S$GLB,,, | Performed by: UROLOGY

## 2018-08-24 PROCEDURE — 99213 OFFICE O/P EST LOW 20 MIN: CPT | Mod: 25,S$GLB,, | Performed by: UROLOGY

## 2018-08-24 PROCEDURE — 99999 PR PBB SHADOW E&M-EST. PATIENT-LVL III: CPT | Mod: PBBFAC,,, | Performed by: UROLOGY

## 2018-08-24 PROCEDURE — 74018 RADEX ABDOMEN 1 VIEW: CPT | Mod: TC,PO

## 2018-08-24 PROCEDURE — 74018 RADEX ABDOMEN 1 VIEW: CPT | Mod: 26,,, | Performed by: RADIOLOGY

## 2018-08-24 PROCEDURE — 3008F BODY MASS INDEX DOCD: CPT | Mod: CPTII,S$GLB,, | Performed by: UROLOGY

## 2018-08-24 RX ORDER — VALACYCLOVIR HYDROCHLORIDE 500 MG/1
TABLET, FILM COATED ORAL
COMMUNITY
Start: 2018-08-04 | End: 2019-02-19

## 2018-08-24 RX ORDER — SUMATRIPTAN SUCCINATE 100 MG/1
TABLET ORAL
COMMUNITY
Start: 2018-08-06 | End: 2019-01-09 | Stop reason: SDUPTHER

## 2018-08-24 RX ORDER — POTASSIUM CHLORIDE 750 MG/1
CAPSULE, EXTENDED RELEASE ORAL
COMMUNITY
Start: 2018-08-06 | End: 2019-01-09

## 2018-08-24 NOTE — PROGRESS NOTES
"Subjective:      Cheryle Olivares is a 48 y.o. female who returns today regarding her nephrolithiasis.    She likely passed a small stone last year. CT then also showed 4mm left renal stone, which she is here to FU w/ KUB. Has some intermittent low back pain but no signs of colic.    The following portions of the patient's history were reviewed and updated as appropriate: allergies, current medications, past family history, past medical history, past social history, past surgical history and problem list.    Review of Systems  A comprehensive multipoint review of systems was negative except as otherwise stated in the HPI.     Objective:   Vitals: /73 (BP Location: Right arm, Patient Position: Sitting, BP Method: Medium (Automatic))   Pulse 69   Ht 5' 4" (1.626 m)   Wt 76.8 kg (169 lb 5 oz)   LMP 11/27/2015 (Exact Date)   BMI 29.06 kg/m²     Physical Exam   General: alert and oriented, no acute distress  Respiratory: Symmetric expansion, non-labored breathing  Neuro: no gross deficits  Psych: normal judgment and insight, normal mood/affect and non-anxious    Lab Review   Urinalysis demonstrates negative for all components  Lab Results   Component Value Date    WBC 10.97 08/20/2017    HGB 13.8 08/20/2017    HCT 39.4 08/20/2017    MCV 91 08/20/2017     08/20/2017     Lab Results   Component Value Date    CREATININE 0.8 08/20/2017    BUN 12 08/20/2017       Imaging (all images personally reviewed; agree with report below)  Results for orders placed during the hospital encounter of 08/24/18   X-Ray Abdomen AP 1 View    Narrative EXAMINATION:  XR ABDOMEN AP 1 VIEW    CLINICAL HISTORY:  Calculus of kidney    TECHNIQUE:  Single AP View of the abdomen was performed.    COMPARISON:  02/23/2018, CT 08/20/2017    FINDINGS:  Punctate stone over the left lower pole, stable.  No new renal calculi identified.  No ureteral or urinary bladder calculi.    Bowel gas pattern appears nonobstructive.  No dilated " loops of bowel identified.  No obvious free air.    Osseous structures.      Impression Punctate nonobstructing left renal calculus, stable from prior exam.      Electronically signed by: Carlos Qiu MD  Date:    08/24/2018  Time:    08:37            Assessment and Plan:   1. Nephrolithiasis  -- Stable renal stone today  -- FU 12 mos w/ KUB

## 2018-11-01 ENCOUNTER — IMMUNIZATION (OUTPATIENT)
Dept: URGENT CARE | Facility: CLINIC | Age: 48
End: 2018-11-01
Payer: COMMERCIAL

## 2018-11-01 DIAGNOSIS — Z23 FLU VACCINE NEED: Primary | ICD-10-CM

## 2018-11-01 PROCEDURE — 90471 IMMUNIZATION ADMIN: CPT | Mod: S$GLB,,, | Performed by: EMERGENCY MEDICINE

## 2018-11-01 PROCEDURE — 90686 IIV4 VACC NO PRSV 0.5 ML IM: CPT | Mod: S$GLB,,, | Performed by: EMERGENCY MEDICINE

## 2019-01-09 ENCOUNTER — OFFICE VISIT (OUTPATIENT)
Dept: OBSTETRICS AND GYNECOLOGY | Facility: CLINIC | Age: 49
End: 2019-01-09
Payer: COMMERCIAL

## 2019-01-09 ENCOUNTER — LAB VISIT (OUTPATIENT)
Dept: LAB | Facility: OTHER | Age: 49
End: 2019-01-09
Attending: OBSTETRICS & GYNECOLOGY
Payer: COMMERCIAL

## 2019-01-09 VITALS
HEIGHT: 64 IN | SYSTOLIC BLOOD PRESSURE: 124 MMHG | BODY MASS INDEX: 28.46 KG/M2 | DIASTOLIC BLOOD PRESSURE: 86 MMHG | WEIGHT: 166.69 LBS

## 2019-01-09 DIAGNOSIS — Z12.39 BREAST CANCER SCREENING: ICD-10-CM

## 2019-01-09 DIAGNOSIS — Z01.419 VISIT FOR GYNECOLOGIC EXAMINATION: Primary | ICD-10-CM

## 2019-01-09 DIAGNOSIS — N95.9 MENOPAUSAL AND PERIMENOPAUSAL DISORDER: ICD-10-CM

## 2019-01-09 DIAGNOSIS — R51.9 CHRONIC NONINTRACTABLE HEADACHE, UNSPECIFIED HEADACHE TYPE: ICD-10-CM

## 2019-01-09 DIAGNOSIS — G89.29 CHRONIC NONINTRACTABLE HEADACHE, UNSPECIFIED HEADACHE TYPE: ICD-10-CM

## 2019-01-09 PROCEDURE — 83001 ASSAY OF GONADOTROPIN (FSH): CPT

## 2019-01-09 PROCEDURE — 99999 PR PBB SHADOW E&M-EST. PATIENT-LVL III: ICD-10-PCS | Mod: PBBFAC,,, | Performed by: OBSTETRICS & GYNECOLOGY

## 2019-01-09 PROCEDURE — 99396 PREV VISIT EST AGE 40-64: CPT | Mod: S$GLB,,, | Performed by: OBSTETRICS & GYNECOLOGY

## 2019-01-09 PROCEDURE — 3074F PR MOST RECENT SYSTOLIC BLOOD PRESSURE < 130 MM HG: ICD-10-PCS | Mod: CPTII,S$GLB,, | Performed by: OBSTETRICS & GYNECOLOGY

## 2019-01-09 PROCEDURE — 36415 COLL VENOUS BLD VENIPUNCTURE: CPT

## 2019-01-09 PROCEDURE — 99396 PR PREVENTIVE VISIT,EST,40-64: ICD-10-PCS | Mod: S$GLB,,, | Performed by: OBSTETRICS & GYNECOLOGY

## 2019-01-09 PROCEDURE — 3074F SYST BP LT 130 MM HG: CPT | Mod: CPTII,S$GLB,, | Performed by: OBSTETRICS & GYNECOLOGY

## 2019-01-09 PROCEDURE — 99999 PR PBB SHADOW E&M-EST. PATIENT-LVL III: CPT | Mod: PBBFAC,,, | Performed by: OBSTETRICS & GYNECOLOGY

## 2019-01-09 PROCEDURE — 3079F DIAST BP 80-89 MM HG: CPT | Mod: CPTII,S$GLB,, | Performed by: OBSTETRICS & GYNECOLOGY

## 2019-01-09 PROCEDURE — 3079F PR MOST RECENT DIASTOLIC BLOOD PRESSURE 80-89 MM HG: ICD-10-PCS | Mod: CPTII,S$GLB,, | Performed by: OBSTETRICS & GYNECOLOGY

## 2019-01-09 RX ORDER — INDOMETHACIN 25 MG/1
25 CAPSULE ORAL EVERY 6 HOURS PRN
Qty: 60 CAPSULE | Refills: 0 | Status: SHIPPED | OUTPATIENT
Start: 2019-01-09 | End: 2019-09-04 | Stop reason: ALTCHOICE

## 2019-01-09 RX ORDER — TOPIRAMATE 25 MG/1
25 TABLET ORAL DAILY
Qty: 30 TABLET | Refills: 0 | Status: SHIPPED | OUTPATIENT
Start: 2019-01-09 | End: 2019-09-04 | Stop reason: ALTCHOICE

## 2019-01-09 RX ORDER — ALPRAZOLAM 0.25 MG/1
0.25 TABLET ORAL 3 TIMES DAILY PRN
Qty: 12 TABLET | Refills: 0 | Status: SHIPPED | OUTPATIENT
Start: 2019-01-09 | End: 2019-09-04 | Stop reason: SDUPTHER

## 2019-01-09 RX ORDER — SUMATRIPTAN SUCCINATE 100 MG/1
100 TABLET ORAL ONCE
Qty: 20 TABLET | Refills: 0 | Status: SHIPPED | OUTPATIENT
Start: 2019-01-09 | End: 2019-09-04 | Stop reason: SDUPTHER

## 2019-01-09 NOTE — PROGRESS NOTES
HISTORY OF PRESENT ILLNESS:    Cheryle Olivares is a 48 y.o. female,   presents today for her routine visit and has no complaints.  MAMMO DUE MAY, ORDERED. HAS BEEN HAVING HEADACHES FOR YEARS, SAW [facility] PROVID[facility] RECENTLY AND FOUND SOME RELIEF WITH ACCUPRESSURE AND ACUPUNCTURE.  WAS VERY EXPENSIVE, NEEDS REF TO NEUROLOGIST SO WILL EXECUTE AND REFILL MEDS FOR A MONTH UNTIL CAN BE SEEN BY NEURO OR HER PCP.  ALSO HAS HAD SOME NIGHT SWEATS, HOT FLUSHES AND TENORIO - NOT ALL MONTHS - DISCUSED PERIMENOPAUSE.  PHYTOESTROGENS OTC AND WILL CHECK FSH LEVEL.  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  NO GYN ISSUES  LAST VISIT 2016:   MAMMO LAST April, UP TO DATE.  PAP NOT INDICATED.  BREASTS MORE TENDER BUT HAS INCREASED HER INTAKE OF CAFFEINE LATELY - DISCUSSED.  REASSURED NO DOMINANT MASS PALPATED  LAST VISIT :  TVH BILATERAL SALPINGECTOMY 12/10/2015. There are no complaints, and both the procedure and the hospital course were uncomplicated.  NO FEVERS, SIGNIFICANT BLEEDING, AND ONLY CRAMPY PAIN. HAS HAD SOME INTERMITTENT PALPITATIONS, NO CP AND NO SOB. WILL VERIFY NL EKG TODAY AND REF FOR EVAL CARDIOLOGY NEXT AVAILABLE. HAD SIMILAR SX WHEN READMITTED PP LAST DELIVERY WITH PREECLAMPSIA BUT NO HTN  The pathology revealed:  UTERUS: CERVIX-NO SIGNIFICANT HISTOLOGICAL ABNORMALITY, NO EVIDENCE OF DYSPLASIA;  ENDOMETRIUM-SECRETORY PHASE;  MYOMETRIUM-LEIOMYOMA;  SEROSA-NO SIGNIFICANT HISTOLOGICAL ABNORMALITY.  FALLOPIAN TUBES (2): PARATUBAL CYST, NO OTHER SIGNIFICANT HISTOLOGICAL ABNORMALITY    Past Medical History:   Diagnosis Date    Allergy     Anemia     History of migraine headaches     Migraine headache     Morbid obesity     Preeclampsia, severe 7/15/2013       Past Surgical History:   Procedure Laterality Date     HYSTERECTOMY      TVH WITHOUT OOPHORECTOMY BLEEDING 12/2015 EK    HYSTERECTOMY-VAGINAL N/A 12/10/2015    [facility] at [facility]    left knee surgery      SALPINGECTOMY Bilateral 12/10/2015    Performed by [facility]las MD at [facility] OR       MEDICATIONS AND ALLERGIES:      Current Outpatient Medications:     ALPRAZolam (XANAX) 0.25 MG tablet, Take 1 tablet (0.25 mg total) by mouth 3 (three) times daily as needed., Disp: 12 tablet, Rfl: 0    cetirizine (ZYRTEC) 10 MG tablet, Take 10 mg by mouth once daily.  , Disp: , Rfl:     indomethacin (INDOCIN) 25 MG capsule, Take 1 capsule (25 mg total) by mouth every 6 (six) hours as needed., Disp: 60 capsule, Rfl: 0    multivitamin capsule, Take 1 capsule by mouth once daily., Disp: , Rfl:     sumatriptan (IMITREX) 100 MG tablet, Take 1 tablet (100 mg total) by mouth once. for 1 dose, Disp: 20 tablet, Rfl: 0    topiramate (TOPAMAX) 25 MG tablet, Take 1 tablet (25 mg total) by mouth once daily., Disp: 30 tablet, Rfl: 0    Lactobacillus acidophilus (PROBIOTIC ACIDOPHILUS ORAL), Probiotic, Disp: , Rfl:     magnesium citrate (CITRATE OF MAGNESIA) solution, Take 296 mLs by mouth once., Disp: , Rfl:     valACYclovir (VALTREX) 500 MG tablet, , Disp: , Rfl:     Review of patient's allergies indicates:  No Known Allergies    Family History   Problem Relation Age of Onset    Skin cancer Father     Melanoma Father     Skin cancer Brother     Melanoma Brother     Colon cancer Maternal Uncle     Miscarriages / Stillbirths Sister     Retinal detachment Mother     Macular degeneration Mother     Hypertension Mother     Hypertension Maternal Grandmother     Macular degeneration Maternal Grandfather     Cataracts Maternal Grandfather     Heart attack Maternal Grandfather     Heart disease Maternal Grandfather     Coronary artery disease Paternal Grandfather     No Known Problems Maternal Aunt     No Known Problems Paternal Aunt     No Known  Problems Paternal Uncle     No Known Problems Paternal Grandmother     Amblyopia Neg Hx     Blindness Neg Hx     Cancer Neg Hx     Diabetes Neg Hx     Glaucoma Neg Hx     Strabismus Neg Hx     Stroke Neg Hx     Thyroid disease Neg Hx     Breast cancer Neg Hx     Ovarian cancer Neg Hx        Social History     Socioeconomic History    Marital status:      Spouse name: Not on file    Number of children: Not on file    Years of education: Not on file    Highest education level: Not on file   Social Needs    Financial resource strain: Not on file    Food insecurity - worry: Not on file    Food insecurity - inability: Not on file    Transportation needs - medical: Not on file    Transportation needs - non-medical: Not on file   Occupational History    Occupation: director     Employer: other/new [email or web address]   Tobacco Use    Smoking status: Never Smoker    Smokeless tobacco: Never Used   Substance and Sexual Activity    Alcohol use: No    Drug use: No    Sexual activity: Yes     Partners: Male     Birth control/protection: None   Other Topics Concern    Are you pregnant or think you may be? No    Breast-feeding Yes   Social History Narrative    Not on file       COMPREHENSIVE GYN HISTORY:  PAP History: Denies abnormal Paps except as noted above.  Infection History: Denies STDs. Denies PID.  Benign History: Denies uterine fibroids. Denies ovarian cysts. Denies endometriosis. Denies other conditions.  Cancer History: Denies cervical cancer. Denies uterine cancer or hyperplasia. Denies ovarian cancer. Denies vulvar cancer or pre-cancer. Denies vaginal cancer or pre-cancer. Denies breast cancer. Denies colon cancer.  Menstrual History: Denies menses.     ROS:  GENERAL: No weight changes. No swelling. No fatigue. No fever.  CARDIOVASCULAR: No chest pain. No shortness of breath. No leg cramps.   NEUROLOGICAL: No headaches. No vision changes.  BREASTS: No pain. No lumps. No  "discharge.  ABDOMEN: No pain. No nausea. No vomiting. No diarrhea. No constipation.  REPRODUCTIVE: No abnormal bleeding.  VULVA: No pain. No lesions. No itching.  VAGINA: No relaxation. No itching. No odor. No discharge. No lesions.  URINARY: No incontinence. No nocturia. No frequency. No dysuria.    /86 (BP Location: Right arm, Patient Position: Sitting, BP Method: Small (Manual))   Ht 5' 4" (1.626 m)   Wt 75.6 kg (166 lb 10.7 oz)   LMP 11/27/2015 (Exact Date)   BMI 28.61 kg/m²     PE:  APPEARANCE: Well nourished, well developed, in no acute distress.  AFFECT: WNL, alert and oriented x 3.  SKIN: No acne or hirsutism.  NECK: Neck symmetric without masses or thyromegaly.  NODES: No inguinal, cervical, axillary or femoral lymph node enlargement.  CHEST: Good respiratory effort.   ABDOMEN: Soft. No tenderness or masses. No hepatosplenomegaly. No hernias.  BREASTS: Symmetrical, no skin changes or visible lesions. No palpable masses, nipple discharge bilaterally.  PELVIC: ATROPHIC EXTERNAL FEMALE GENITALIA without lesions. Normal hair distribution. Adequate perineal body, normal urethral meatus. VAGINA DRY without lesions or discharge. No significant cystocele or rectocele. Bimanual exam shows CERVIX and UTERUS to be SURGICALLY ABSENT. Adnexa without masses or tenderness, PALPATED A 4CM L OVARY.  EXTREMITIES: No edema.    DIAGNOSIS:  1. Visit for gynecologic examination     2. Breast cancer screening  Mammo Digital Screening Bilat w/ [facility]ador   3. Menopausal and perimenopausal disorder  Follicle stimulating hormone   4. Chronic nonintractable headache, unspecified headache type  Ambulatory consult to Neurology       LABS AND TESTS ORDERED:  Mammogram    MEDICATIONS PRESCRIBED:    COUNSELING:  The patient was counseled today on osteoporosis prevention, calcium supplementation, and regular weight bearing exercise. The patient was also counseled today on ACS PAP guidelines, with recommendations for yearly pelvic exams " unless their uterus, cervix, and ovaries were removed for benign reasons; in that case, examinations every 3-5 years are recommended.  The patient was also counseled regarding monthly breast self-examination, routine STD screening for at-risk populations, prophylactic immunizations for transmitted infections such as  HPV, Pertussis, or Influenza as appropriate, and yearly mammograms when indicated by ACS guidelines.  She was advised to see her primary care physician for all other health maintenance.  FOLLOW-UP with me for next routine visit.

## 2019-01-10 ENCOUNTER — PATIENT MESSAGE (OUTPATIENT)
Dept: OBSTETRICS AND GYNECOLOGY | Facility: HOSPITAL | Age: 49
End: 2019-01-10

## 2019-01-10 LAB — FSH SERPL-ACNC: 135.2 MIU/ML

## 2019-01-16 ENCOUNTER — TELEPHONE (OUTPATIENT)
Dept: INTERNAL MEDICINE | Facility: CLINIC | Age: 49
End: 2019-01-16

## 2019-01-16 ENCOUNTER — TELEPHONE (OUTPATIENT)
Dept: NEUROLOGY | Facility: CLINIC | Age: 49
End: 2019-01-16

## 2019-01-17 NOTE — TELEPHONE ENCOUNTER
----- Message from Shantell Valentin RN sent at 1/16/2019 12:01 PM CST -----  Contact: [phone]      ----- Message -----  From: Chastity Martinez  Sent: 1/16/2019  11:08 AM  To: [facility]    Pt is was referred to Dr. Granda by Dr. Lidia Islas for headaches. Pt has been seen Dr. Melissa Sheriff previously. Please call patient and advise thanks

## 2019-01-18 ENCOUNTER — HOSPITAL ENCOUNTER (OUTPATIENT)
Dept: RADIOLOGY | Facility: HOSPITAL | Age: 49
Discharge: HOME | End: 2019-01-18
Attending: OBSTETRICS & GYNECOLOGY
Payer: COMMERCIAL

## 2019-01-18 DIAGNOSIS — Z12.39 BREAST CANCER SCREENING: ICD-10-CM

## 2019-01-18 PROCEDURE — 77067 SCR MAMMO BI INCL CAD: CPT | Mod: TC

## 2019-01-18 PROCEDURE — 77067 SCR MAMMO BI INCL CAD: CPT | Mod: 26,,, | Performed by: RADIOLOGY

## 2019-01-18 PROCEDURE — 77063 MAMMO DIGITAL SCREENING BILAT WITH TOMOSYNTHESIS_CAD: ICD-10-PCS | Mod: 26,,, | Performed by: RADIOLOGY

## 2019-01-18 PROCEDURE — 77063 BREAST TOMOSYNTHESIS BI: CPT | Mod: 26,,, | Performed by: RADIOLOGY

## 2019-01-18 PROCEDURE — 77067 MAMMO DIGITAL SCREENING BILAT WITH TOMOSYNTHESIS_CAD: ICD-10-PCS | Mod: 26,,, | Performed by: RADIOLOGY

## 2019-02-05 ENCOUNTER — OFFICE VISIT (OUTPATIENT)
Dept: NEUROLOGY | Facility: CLINIC | Age: 49
End: 2019-02-05
Payer: COMMERCIAL

## 2019-02-05 ENCOUNTER — PATIENT MESSAGE (OUTPATIENT)
Dept: OBSTETRICS AND GYNECOLOGY | Facility: CLINIC | Age: 49
End: 2019-02-05

## 2019-02-05 VITALS
HEIGHT: 64 IN | SYSTOLIC BLOOD PRESSURE: 130 MMHG | WEIGHT: 166.88 LBS | HEART RATE: 78 BPM | DIASTOLIC BLOOD PRESSURE: 83 MMHG | BODY MASS INDEX: 28.49 KG/M2

## 2019-02-05 DIAGNOSIS — G43.019 INTRACTABLE MIGRAINE WITHOUT AURA AND WITHOUT STATUS MIGRAINOSUS: Primary | ICD-10-CM

## 2019-02-05 DIAGNOSIS — G93.5: ICD-10-CM

## 2019-02-05 DIAGNOSIS — M54.2 NECK PAIN: ICD-10-CM

## 2019-02-05 PROCEDURE — 99999 PR PBB SHADOW E&M-EST. PATIENT-LVL III: CPT | Mod: PBBFAC,,, | Performed by: PSYCHIATRY & NEUROLOGY

## 2019-02-05 PROCEDURE — 3075F PR MOST RECENT SYSTOLIC BLOOD PRESS GE 130-139MM HG: ICD-10-PCS | Mod: CPTII,S$GLB,, | Performed by: PSYCHIATRY & NEUROLOGY

## 2019-02-05 PROCEDURE — 99205 OFFICE O/P NEW HI 60 MIN: CPT | Mod: S$GLB,,, | Performed by: PSYCHIATRY & NEUROLOGY

## 2019-02-05 PROCEDURE — 3008F BODY MASS INDEX DOCD: CPT | Mod: CPTII,S$GLB,, | Performed by: PSYCHIATRY & NEUROLOGY

## 2019-02-05 PROCEDURE — 3079F DIAST BP 80-89 MM HG: CPT | Mod: CPTII,S$GLB,, | Performed by: PSYCHIATRY & NEUROLOGY

## 2019-02-05 PROCEDURE — 99999 PR PBB SHADOW E&M-EST. PATIENT-LVL III: ICD-10-PCS | Mod: PBBFAC,,, | Performed by: PSYCHIATRY & NEUROLOGY

## 2019-02-05 PROCEDURE — 99205 PR OFFICE/OUTPT VISIT, NEW, LEVL V, 60-74 MIN: ICD-10-PCS | Mod: S$GLB,,, | Performed by: PSYCHIATRY & NEUROLOGY

## 2019-02-05 PROCEDURE — 3079F PR MOST RECENT DIASTOLIC BLOOD PRESSURE 80-89 MM HG: ICD-10-PCS | Mod: CPTII,S$GLB,, | Performed by: PSYCHIATRY & NEUROLOGY

## 2019-02-05 PROCEDURE — 3008F PR BODY MASS INDEX (BMI) DOCUMENTED: ICD-10-PCS | Mod: CPTII,S$GLB,, | Performed by: PSYCHIATRY & NEUROLOGY

## 2019-02-05 PROCEDURE — 3075F SYST BP GE 130 - 139MM HG: CPT | Mod: CPTII,S$GLB,, | Performed by: PSYCHIATRY & NEUROLOGY

## 2019-02-05 RX ORDER — MELOXICAM 15 MG/1
TABLET ORAL
Qty: 10 TABLET | Refills: 12 | Status: SHIPPED | OUTPATIENT
Start: 2019-02-05 | End: 2019-09-04 | Stop reason: SDUPTHER

## 2019-02-05 RX ORDER — GABAPENTIN 100 MG/1
100 CAPSULE ORAL NIGHTLY
Qty: 30 CAPSULE | Refills: 11 | Status: SHIPPED | OUTPATIENT
Start: 2019-02-05 | End: 2019-09-04 | Stop reason: ALTCHOICE

## 2019-02-05 NOTE — PROGRESS NOTES
Subjective:       Patient ID: Cheryle Olivares is a 48 y.o. female.    Chief Complaint: Migraine    HPI   Pt of Dr. Espinoza     Oct 2014 - diag with Hemicran cont (L) - pain points on the L side + photo/phono  Indomethacin 25mg PRN - helped   Currently 4/week Hz     Headache history:   Age of onset -  Childhood   Location - R or L   Nature of pain -  Throbbing   Prodrome - no   Aura -  No   Duration of headache - > 4 hrs   Time to peak intensity - hrs   Pain scale - range of intensity -  8/10  Frequency -  2-3/month   Status Migrainosus history - yes   Time of day of most headaches- anytime     Associated symptoms with the headache:   Meningeal symptoms - photophobia, phonophobia, exercise intolerance +   Nausea/vomitting+   Nasal drainage   Visual blurriness   Pallor/flushing  Dizziness +   Vertigo  Confusion  Impaired concentration +   Pain worsened with physical activity +   Neck pain   Cheek pain on R +     Cluster headache symptoms: none     Symptoms of increased intracranial pressure: none     Basilar migraine symptoms: none     Headache Triggers:   Stress +   Bright or flickering light  Strong smell  Vigorous exercise  Dietary factors   Visual strain   Weather changes +   Exertion  Heat (hot weather, hot baths or showers)  Menses    Other comorbid conditions:  Anxiety - yes   Motion sickness symptom - no     Treatment history:  Resolution of headache with sleep - yes   Meds tried:  H/o Renal stones   Fioricet, relpax, xanax   Flexeril   Medrol dose   topamax  maxalt - sluggish   Antiviral - not help   accupressure - helped   accupunture - helped   PT - not tried     Headache risk factors:   H/o TBI  - no   H/o Meningitis  - no   F/h Aneurysms  - no    Headache burden:   In the last three months:  Days missed from work = several   Days unable to perform activities of daily living = 0  Days unable to attend social activities = several       Review of Systems   Constitutional: Negative.    HENT: Negative.     Eyes: Negative.    Respiratory: Negative.    Cardiovascular: Negative.    Gastrointestinal: Negative.    Endocrine: Negative.    Genitourinary: Negative.    Musculoskeletal: Negative.    Skin: Negative.    Allergic/Immunologic: Negative.    Neurological: Negative.    Hematological: Negative.    Psychiatric/Behavioral: Negative.        Objective:      Physical Exam   Constitutional: She is oriented to person, place, and time. She appears well-developed and well-nourished.   HENT:   Head: Normocephalic and atraumatic.   Right Ear: External ear normal.   Left Ear: External ear normal.   Nose: Nose normal.   Mouth/Throat: Oropharynx is clear and moist.   Eyes: Conjunctivae and EOM are normal. Pupils are equal, round, and reactive to light.   Neck: Normal range of motion. Neck supple.   Cardiovascular: Normal rate and regular rhythm.   Musculoskeletal: Normal range of motion.   Neurological: She is alert and oriented to person, place, and time. She displays normal reflexes. No cranial nerve deficit or sensory deficit. She exhibits normal muscle tone. Coordination normal.   Skin: Skin is warm and dry.   Psychiatric: She has a normal mood and affect. Her behavior is normal. Judgment and thought content normal.       Assessment:       1. Intractable migraine without aura and without status migrainosus    2. Neck pain    3. Tonsillar hernia into foramen magnum      Her HA are unlikely from a TAC and more likely from her tonsillar herniation and posture     MRI C spine  Impression     No focal disk abnormalities or significant central canal or neural foraminal stenosis.  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    2-3 punctate foci of T2/flair signal hyperintensity frontal subcortical white matter which are nonspecific and of uncertain clinical  significance.    Otherwise unremarkable MRI brain without evidence for acute infarction or enhancing lesion.    Electronically signed by: TRISH DENVER TORRES  Date: 10/24/14  Time: 14:27        Plan:   1. Given long standing history of headaches with no change in character and no associated neurological symptoms, no reportable neurological symptoms in between episodes, and normal neuro exam today there is no indication for an MRI.   2. Will check vitamin B1, B2, B6, B12, Vitamin D, TSH, T4, gluten enteropathy testing.  3. Given prescription for prophylactic medication - Topamax 25 mg. I discussed side effects of the medications. We will start at a lower dose. I asked her to stop the medication if she notices serious adverse effects like psychosis and seek immediate medical attention at an ER. Start Gabapentin 100 mg    Breakthrough headache - imitrex 100 mg, indomethacin, xanax, mobic    Multiple alternative treatment options were offered to the patient  4. Patient education. Discussed prevention and treatment of migraine headaches. Discussed sleep hygiene, healthy diet, importance of minimizing caffeine intake to a maximum of 100-200 mg /day, importance of avoiding foods with MSG, Nutrasweet, and Aspartame.   Provided hand outs on this.   5. Refrain from over counter pain medications. Discussed medication overuse headache.  6. I urged the patient to keep a headache calender.     May try estrogen patch     Referral to PT   Try [employer]    Patient verbalized understanding to plan. I answered all her questions to her satisfaction.

## 2019-02-05 NOTE — PATIENT INSTRUCTIONS
Preventing Migraine Headaches: Medicines and Lifestyle Changes     Going to bed and getting up at the same time each day, including weekends, may help prevent migraines.     A migraine is a type of severe headache. Having a migraine can be very painful. But there are steps you can take to help prevent migraines.  Medicines to help prevent migraines  · Your healthcare provider may prescribe certain medicines to help prevent migraines. These medicines may need to be taken daily. Or they may only need to be taken at times when youre likely to have a migraine.  · Common medicines used to help prevent migraines include:  ¨ Triptans (serotonin receptor agonists)  ¨ Nonsteroidal anti-inflammatory drugs (available over-the-counter)  ¨ Beta-blockers  ¨ Anticonvulsants  ¨ Tricyclic antidepressants  ¨ Calcium channel blockers  ¨ Certain vitamins, minerals, and plant extracts  ¨ Botulinum toxin injection (Botox) for certain chronic migraines   ¨ CGRP (calcitonin gene-related peptide) agnonists are being reviewed by the Food and Drug Administration (FDA)  Lifestyle changes for long-term prevention  Here are some suggestions:  · Exercise. Regular exercise can help prevent migraines and improve your health. (If exercise triggers your migraines, talk to your healthcare provider.)  · Keep regular habits. Dont skip or delay meals. Drink plenty of water. And go to bed and get up at about the same time each day. This includes weekends.  · Try alternative treatments. These are treatments that do not involve the use of medicines or surgery. They may help relieve symptoms and prevent migraines. Some treatment options include biofeedback and acupuncture. Ask your healthcare provider to tell you more about these treatments if you have questions.  · Limit caffeine. You may find that caffeine helps relieve pain during an attack. But too much caffeine can also trigger migraines. So, limit the amount of caffeine you consume.  Date Last  Reviewed: 10/11/2015  © 7036-2038 [employer], [employer]. [address] [place] [address], [place] [address]. All rights reserved. This information is not intended as a substitute for professional medical care. Always follow your healthcare professional's instructions.

## 2019-02-05 NOTE — LETTER
February 5, 2019      Lidia Islas MD  [address]  [address]  [facility] [address]           [facility] - Neurology Epilepsy  [address], [address]  [facility] [address]  Phone: [phone]  Fax: [phone]          Patient: Cheryle Olivares   MR Number: [id number]   YOB: 1970   Date of Visit: 2/5/2019       Dear Dr. Lidia Islas:    Thank you for referring Cheryle Olivares to me for evaluation. Attached you will find relevant portions of my assessment and plan of care.    If you have questions, please do not hesitate to call me. I look forward to following Cheryle Olivares along with you.    Sincerely,    Sergio Granda MD    Enclosure  CC:  No Recipients    If you would like to receive this communication electronically, please contact [email or web address] or [phone] to request more information on [employer] access.    For providers and/or their staff who would like to refer a patient to [facility], please contact us through our one-stop-shop provider referral line, [facility], at [phone].    If you feel you have received this communication in error or would no longer like to receive these types of communications, please e-mail [email or web address]

## 2019-02-06 RX ORDER — ESTRADIOL 0.05 MG/D
1 FILM, EXTENDED RELEASE TRANSDERMAL WEEKLY
Qty: 12 PATCH | Refills: 3 | Status: SHIPPED | OUTPATIENT
Start: 2019-02-06 | End: 2019-09-04 | Stop reason: SDUPTHER

## 2019-02-19 ENCOUNTER — LAB VISIT (OUTPATIENT)
Dept: LAB | Facility: HOSPITAL | Age: 49
End: 2019-02-19
Attending: INTERNAL MEDICINE
Payer: COMMERCIAL

## 2019-02-19 ENCOUNTER — OFFICE VISIT (OUTPATIENT)
Dept: INTERNAL MEDICINE | Facility: CLINIC | Age: 49
End: 2019-02-19
Payer: COMMERCIAL

## 2019-02-19 VITALS
HEIGHT: 64 IN | BODY MASS INDEX: 29.1 KG/M2 | OXYGEN SATURATION: 99 % | DIASTOLIC BLOOD PRESSURE: 90 MMHG | WEIGHT: 170.44 LBS | SYSTOLIC BLOOD PRESSURE: 140 MMHG | HEART RATE: 69 BPM

## 2019-02-19 DIAGNOSIS — R00.2 PALPITATIONS: ICD-10-CM

## 2019-02-19 DIAGNOSIS — I10 HYPERTENSION, UNSPECIFIED TYPE: ICD-10-CM

## 2019-02-19 DIAGNOSIS — R00.2 PALPITATIONS: Primary | ICD-10-CM

## 2019-02-19 DIAGNOSIS — F43.9 STRESS: ICD-10-CM

## 2019-02-19 DIAGNOSIS — G43.019 INTRACTABLE MIGRAINE WITHOUT AURA AND WITHOUT STATUS MIGRAINOSUS: ICD-10-CM

## 2019-02-19 LAB
ALBUMIN SERPL BCP-MCNC: 4.1 G/DL
ALP SERPL-CCNC: 78 U/L
ALT SERPL W/O P-5'-P-CCNC: 17 U/L
ANION GAP SERPL CALC-SCNC: 6 MMOL/L
AST SERPL-CCNC: 27 U/L
BASOPHILS # BLD AUTO: 0.02 K/UL
BASOPHILS NFR BLD: 0.3 %
BILIRUB SERPL-MCNC: 0.4 MG/DL
BUN SERPL-MCNC: 17 MG/DL
CALCIUM SERPL-MCNC: 9.9 MG/DL
CHLORIDE SERPL-SCNC: 105 MMOL/L
CO2 SERPL-SCNC: 30 MMOL/L
CREAT SERPL-MCNC: 0.8 MG/DL
DIFFERENTIAL METHOD: NORMAL
EOSINOPHIL # BLD AUTO: 0.1 K/UL
EOSINOPHIL NFR BLD: 1.1 %
ERYTHROCYTE [DISTWIDTH] IN BLOOD BY AUTOMATED COUNT: 13.2 %
EST. GFR  (AFRICAN AMERICAN): >60 ML/MIN/1.73 M^2
EST. GFR  (NON AFRICAN AMERICAN): >60 ML/MIN/1.73 M^2
GLUCOSE SERPL-MCNC: 92 MG/DL
HCT VFR BLD AUTO: 40.1 %
HGB BLD-MCNC: 13.1 G/DL
LYMPHOCYTES # BLD AUTO: 2.2 K/UL
LYMPHOCYTES NFR BLD: 34 %
MCH RBC QN AUTO: 30.5 PG
MCHC RBC AUTO-ENTMCNC: 32.7 G/DL
MCV RBC AUTO: 94 FL
MONOCYTES # BLD AUTO: 0.4 K/UL
MONOCYTES NFR BLD: 6.9 %
NEUTROPHILS # BLD AUTO: 3.7 K/UL
NEUTROPHILS NFR BLD: 57.5 %
PLATELET # BLD AUTO: 214 K/UL
PMV BLD AUTO: 9.2 FL
POTASSIUM SERPL-SCNC: 3.6 MMOL/L
PROT SERPL-MCNC: 7 G/DL
RBC # BLD AUTO: 4.29 M/UL
SODIUM SERPL-SCNC: 141 MMOL/L
TSH SERPL DL<=0.005 MIU/L-ACNC: 2.19 UIU/ML
WBC # BLD AUTO: 6.39 K/UL

## 2019-02-19 PROCEDURE — 36415 COLL VENOUS BLD VENIPUNCTURE: CPT

## 2019-02-19 PROCEDURE — 93005 ELECTROCARDIOGRAM TRACING: CPT | Mod: PBBFAC | Performed by: INTERNAL MEDICINE

## 2019-02-19 PROCEDURE — 99999 PR PBB SHADOW E&M-EST. PATIENT-LVL II: ICD-10-PCS | Mod: PBBFAC,,, | Performed by: INTERNAL MEDICINE

## 2019-02-19 PROCEDURE — 93010 ELECTROCARDIOGRAM REPORT: CPT | Mod: S$GLB,,, | Performed by: INTERNAL MEDICINE

## 2019-02-19 PROCEDURE — 93010 EKG 12-LEAD: ICD-10-PCS | Mod: S$GLB,,, | Performed by: INTERNAL MEDICINE

## 2019-02-19 PROCEDURE — 80053 COMPREHEN METABOLIC PANEL: CPT

## 2019-02-19 PROCEDURE — 85025 COMPLETE CBC W/AUTO DIFF WBC: CPT

## 2019-02-19 PROCEDURE — 99214 OFFICE O/P EST MOD 30 MIN: CPT | Mod: S$GLB,,, | Performed by: INTERNAL MEDICINE

## 2019-02-19 PROCEDURE — 93005 ELECTROCARDIOGRAM TRACING: CPT | Mod: S$GLB,,, | Performed by: INTERNAL MEDICINE

## 2019-02-19 PROCEDURE — 84443 ASSAY THYROID STIM HORMONE: CPT

## 2019-02-19 PROCEDURE — 99212 OFFICE O/P EST SF 10 MIN: CPT | Mod: PBBFAC,25 | Performed by: INTERNAL MEDICINE

## 2019-02-19 PROCEDURE — 99214 PR OFFICE/OUTPT VISIT, EST, LEVL IV, 30-39 MIN: ICD-10-PCS | Mod: S$GLB,,, | Performed by: INTERNAL MEDICINE

## 2019-02-19 PROCEDURE — 93005 EKG 12-LEAD: ICD-10-PCS | Mod: S$GLB,,, | Performed by: INTERNAL MEDICINE

## 2019-02-19 PROCEDURE — 99999 PR PBB SHADOW E&M-EST. PATIENT-LVL II: CPT | Mod: PBBFAC,,, | Performed by: INTERNAL MEDICINE

## 2019-02-19 RX ORDER — PROPRANOLOL HYDROCHLORIDE 10 MG/1
10 TABLET ORAL 3 TIMES DAILY PRN
Qty: 60 TABLET | Refills: 0 | Status: SHIPPED | OUTPATIENT
Start: 2019-02-19 | End: 2019-03-20 | Stop reason: ALTCHOICE

## 2019-02-19 NOTE — PROGRESS NOTES
Chief Complaint: palpitations    HPI: This is a 48 year old woman who presents due to palpitations for the last 3 weeks. Palpitations can last from seconds to minutes several times a day.  She has been under a lot of stress. Mother in law  in December. Dog  several weeks ago. Kids sick in January. Work stress. She has been drinking more caffeine than usual.  She has not been sleeping well due to menopause symptoms. No chest pain, shortness of breath. She has a sensation where she feels that she has to cough occaionally. Cough does not affect palpitations.  Bp has been slightly higher - 130's /90's yesterday at pharmacy.  She had similar symptoms in 2016 with a normal holter monitor.     She started on hormone replacement 2 weeks ago (after palpuitations started). She feels better on the hormones.     REVIEW OF SYSTEMS: She denies fevers, chills, visual change, hearing loss, sinus congestion, sore throat, chest pain, shortness of breath, nausea, vomiting, constipation, diarrhea, dysuria, hematuria, polydipsia, polyuria.    Past Medical History:   Diagnosis Date    Allergy     Anemia     History of migraine headaches     Migraine headache     Morbid obesity     Preeclampsia, severe 7/15/2013     Past Surgical History:   Procedure Laterality Date    HYSTERECTOMY      TVH WITHOUT OOPHORECTOMY BLEEDING 2015 EK    HYSTERECTOMY-VAGINAL N/A 12/10/2015    Performed by Lidia Islas MD at [facility] [place]    left knee surgery      SALPINGECTOMY Bilateral 12/10/2015    Performed by Lidia Islas MD at [facility] [place]     Social History     Socioeconomic History    Marital status:      Spouse name: Not on file    Number of children: Not on file    Years of education: Not on file    Highest education level: Not on file   Social Needs    Financial resource strain: Not on file    Food insecurity - worry: Not on file    Food insecurity - inability: Not on file    Transportation needs - medical: Not on file  "   Transportation needs - non-medical: Not on file   Occupational History    Occupation: director     Employer: other/[employer]   Tobacco Use    Smoking status: Never Smoker    Smokeless tobacco: Never Used   Substance and Sexual Activity    Alcohol use: No    Drug use: No    Sexual activity: Yes     Partners: Male     Birth control/protection: None   Other Topics Concern    Are you pregnant or think you may be? No    Breast-feeding Yes   Social History Narrative    Not on file     . Meds and allergies: updated on EPIC      Physical exam:  BP (!) 140/90   Pulse 69   Ht 5' 4" (1.626 m)   Wt 77.3 kg (170 lb 6.7 oz)   LMP 11/27/2015 (Exact Date)   SpO2 99%   BMI 29.25 kg/m²     General: alert, oriented x 3, no apparent distress.  Affect normal  HEENT: Conjunctivae: anicteric, PERRL, EOMI, TM clear, Oralpharynx clear  Neck: supple, no thyroid enlargement, no cervical lymphadenopathy  Resp: effort normal, lungs clear bilaterally  CV: Regular rate and rhythm without murmurs, gallops or rubs, no lower extremity edema,  Abdomen: soft, non-distended, non-tender, bowel sounds present, no hepatosplenomegaly.    EKG - NSR at rate of 68    Assessment/Plan:  Palpitations - cbc, cmp, tsh. Propranolol 10 mg three times daily as needed for palpitations. Could help stress. Discussed caffeine reduction and exercise.  Discussed holter monitor - ordered- she will think about it.   ELevated blood pressure- montior BP at home  Stress - discussesd stress reduction  Has an apt to see Dr Steele next month, to let me know if things do not improve or wrosen      "

## 2019-03-19 ENCOUNTER — PATIENT OUTREACH (OUTPATIENT)
Dept: ADMINISTRATIVE | Facility: HOSPITAL | Age: 49
End: 2019-03-19

## 2019-03-19 DIAGNOSIS — Z13.220 NEED FOR LIPID SCREENING: Primary | ICD-10-CM

## 2019-03-20 ENCOUNTER — OFFICE VISIT (OUTPATIENT)
Dept: INTERNAL MEDICINE | Facility: CLINIC | Age: 49
End: 2019-03-20
Payer: COMMERCIAL

## 2019-03-20 VITALS
BODY MASS INDEX: 28.76 KG/M2 | SYSTOLIC BLOOD PRESSURE: 132 MMHG | OXYGEN SATURATION: 98 % | HEIGHT: 64 IN | HEART RATE: 87 BPM | WEIGHT: 168.44 LBS | DIASTOLIC BLOOD PRESSURE: 82 MMHG

## 2019-03-20 DIAGNOSIS — J98.01 BRONCHOSPASM: ICD-10-CM

## 2019-03-20 DIAGNOSIS — Z71.89 COUNSELING FOR ESTROGEN REPLACEMENT THERAPY: ICD-10-CM

## 2019-03-20 DIAGNOSIS — Z00.00 ANNUAL PHYSICAL EXAM: Primary | ICD-10-CM

## 2019-03-20 DIAGNOSIS — Z90.710 STATUS POST VAGINAL HYSTERECTOMY: ICD-10-CM

## 2019-03-20 DIAGNOSIS — G93.5: ICD-10-CM

## 2019-03-20 DIAGNOSIS — R05.9 COUGH: ICD-10-CM

## 2019-03-20 PROBLEM — R92.1 BREAST CALCIFICATION SEEN ON MAMMOGRAM: Status: RESOLVED | Noted: 2017-05-15 | Resolved: 2019-03-20

## 2019-03-20 PROCEDURE — 99396 PREV VISIT EST AGE 40-64: CPT | Mod: S$GLB,,, | Performed by: INTERNAL MEDICINE

## 2019-03-20 PROCEDURE — 99396 PR PREVENTIVE VISIT,EST,40-64: ICD-10-PCS | Mod: S$GLB,,, | Performed by: INTERNAL MEDICINE

## 2019-03-20 PROCEDURE — 3079F DIAST BP 80-89 MM HG: CPT | Mod: CPTII,S$GLB,, | Performed by: INTERNAL MEDICINE

## 2019-03-20 PROCEDURE — 99999 PR PBB SHADOW E&M-EST. PATIENT-LVL IV: CPT | Mod: PBBFAC,,, | Performed by: INTERNAL MEDICINE

## 2019-03-20 PROCEDURE — 3075F PR MOST RECENT SYSTOLIC BLOOD PRESS GE 130-139MM HG: ICD-10-PCS | Mod: CPTII,S$GLB,, | Performed by: INTERNAL MEDICINE

## 2019-03-20 PROCEDURE — 99999 PR PBB SHADOW E&M-EST. PATIENT-LVL IV: ICD-10-PCS | Mod: PBBFAC,,, | Performed by: INTERNAL MEDICINE

## 2019-03-20 PROCEDURE — 3079F PR MOST RECENT DIASTOLIC BLOOD PRESSURE 80-89 MM HG: ICD-10-PCS | Mod: CPTII,S$GLB,, | Performed by: INTERNAL MEDICINE

## 2019-03-20 PROCEDURE — 3075F SYST BP GE 130 - 139MM HG: CPT | Mod: CPTII,S$GLB,, | Performed by: INTERNAL MEDICINE

## 2019-03-20 NOTE — PATIENT INSTRUCTIONS
Monoclonal antibodies  CGRP  AIMOVIG  Ajovy  emgality  Effective  Safe  [employer]

## 2019-03-21 NOTE — PROGRESS NOTES
Subjective:       Patient ID: Cheryle Olivares is a 49 y.o. female.    Chief Complaint: Annual Exam  This dictation was performed using using [employer].    She presents for an annual physical.  Her screenings and immunizations are up-to-date.  She takes good care of herself.    She had a hysterectomy because of menorrhagia and recently developed severe symptoms of menopause with drenching night sweats infrequent hot flashes that have been much improved since she started an estrogen patch at a low dose.  This is not exacerbating her headaches.    She is in physical therapy and having less severe and less frequent headache problems.    She has some trouble with palpitations but this seems to be clearing up she does have a little bit of a cough and feels as though she can't take a complete deep breath as if there is a slight tightness in her chest.  She was prescribed propranolol for palpitations but she has not taken it.  HPI  Review of Systems   Constitutional: Negative for activity change, appetite change, chills, fatigue, fever and unexpected weight change.   HENT: Negative for hearing loss.    Eyes: Negative for visual disturbance.   Respiratory: Negative for cough, chest tightness, shortness of breath and wheezing.    Cardiovascular: Negative for chest pain, palpitations and leg swelling.   Gastrointestinal: Negative for abdominal pain, constipation, nausea and vomiting.   Genitourinary: Negative for dysuria, frequency and urgency.   Musculoskeletal: Negative for arthralgias, back pain, gait problem, joint swelling and myalgias.   Skin: Negative for rash.   Neurological: Negative for light-headedness and headaches.   Psychiatric/Behavioral: Negative for dysphoric mood and sleep disturbance. The patient is not nervous/anxious.        Objective:      Physical Exam   Constitutional: She appears well-nourished.   HENT:   Head: Atraumatic.   Eyes: Conjunctivae are normal. No scleral icterus.   Neck: Neck supple.    Cardiovascular: Normal rate and regular rhythm.   Pulmonary/Chest: Effort normal and breath sounds normal.   Abdominal: Soft. There is no tenderness.   Musculoskeletal: She exhibits no edema.   Lymphadenopathy:     She has no cervical adenopathy.   Neurological: She is alert.   Skin: Skin is warm and dry.   Psychiatric: She has a normal mood and affect. Her behavior is normal.   Nursing note and vitals reviewed.      Assessment:       1. Annual physical exam    2. Status post vaginal hysterectomy    3. Tonsillar hernia into foramen magnum    4. Cough    5. Bronchospasm    6. estrogen replacement therapy        Plan:   Cheryle was seen today for annual exam.    Diagnoses and all orders for this visit:    Annual physical exam, it is a pleasure to see this pleasant woman doing so much better overall today.    Status post vaginal hysterectomy, now doing well on estrogen replacement therapy by patch at a low dose that is controlling vasomotor symptoms of menopause.    Tonsillar hernia into foramen magnum, she is under the care of Dr. Granda, a headache specialist in is doing much better.    Cough  Bronchospasm, she has a little bit of a cough and on physical examination there is a slight wheeze audible on forced expiration.  I do not want to make much of this, as it could simply be a viral respiratory illness that is resolving.  The main thing is that the palpitations have pretty much gone away.  Since her headaches are so much improved, she is taking less indomethacin.  Indomethacin has been known to trigger bronchospasm is susceptible individuals.  Maybe this is what is going on.  I thought we might want to get back together again in about 3 months if she continues to have any concern regarding her cardiopulmonary system.

## 2019-09-04 ENCOUNTER — OFFICE VISIT (OUTPATIENT)
Dept: INTERNAL MEDICINE | Facility: CLINIC | Age: 49
End: 2019-09-04
Payer: COMMERCIAL

## 2019-09-04 VITALS
BODY MASS INDEX: 28.31 KG/M2 | HEIGHT: 64 IN | SYSTOLIC BLOOD PRESSURE: 118 MMHG | HEART RATE: 79 BPM | DIASTOLIC BLOOD PRESSURE: 82 MMHG | WEIGHT: 165.81 LBS | OXYGEN SATURATION: 98 %

## 2019-09-04 DIAGNOSIS — Z71.89 COUNSELING FOR ESTROGEN REPLACEMENT THERAPY: ICD-10-CM

## 2019-09-04 DIAGNOSIS — M54.2 NECK PAIN: ICD-10-CM

## 2019-09-04 DIAGNOSIS — Z90.710 STATUS POST VAGINAL HYSTERECTOMY: Primary | ICD-10-CM

## 2019-09-04 DIAGNOSIS — R79.89 HIGH SERUM LOW-DENSITY LIPOPROTEIN (LDL): ICD-10-CM

## 2019-09-04 PROCEDURE — 99214 PR OFFICE/OUTPT VISIT, EST, LEVL IV, 30-39 MIN: ICD-10-PCS | Mod: S$GLB,,, | Performed by: INTERNAL MEDICINE

## 2019-09-04 PROCEDURE — 99999 PR PBB SHADOW E&M-EST. PATIENT-LVL III: CPT | Mod: PBBFAC,,, | Performed by: INTERNAL MEDICINE

## 2019-09-04 PROCEDURE — 99999 PR PBB SHADOW E&M-EST. PATIENT-LVL III: ICD-10-PCS | Mod: PBBFAC,,, | Performed by: INTERNAL MEDICINE

## 2019-09-04 PROCEDURE — 99214 OFFICE O/P EST MOD 30 MIN: CPT | Mod: S$GLB,,, | Performed by: INTERNAL MEDICINE

## 2019-09-04 PROCEDURE — 3074F SYST BP LT 130 MM HG: CPT | Mod: CPTII,S$GLB,, | Performed by: INTERNAL MEDICINE

## 2019-09-04 PROCEDURE — 3079F PR MOST RECENT DIASTOLIC BLOOD PRESSURE 80-89 MM HG: ICD-10-PCS | Mod: CPTII,S$GLB,, | Performed by: INTERNAL MEDICINE

## 2019-09-04 PROCEDURE — 3008F BODY MASS INDEX DOCD: CPT | Mod: CPTII,S$GLB,, | Performed by: INTERNAL MEDICINE

## 2019-09-04 PROCEDURE — 3008F PR BODY MASS INDEX (BMI) DOCUMENTED: ICD-10-PCS | Mod: CPTII,S$GLB,, | Performed by: INTERNAL MEDICINE

## 2019-09-04 PROCEDURE — 3074F PR MOST RECENT SYSTOLIC BLOOD PRESSURE < 130 MM HG: ICD-10-PCS | Mod: CPTII,S$GLB,, | Performed by: INTERNAL MEDICINE

## 2019-09-04 PROCEDURE — 3079F DIAST BP 80-89 MM HG: CPT | Mod: CPTII,S$GLB,, | Performed by: INTERNAL MEDICINE

## 2019-09-04 RX ORDER — ALPRAZOLAM 0.25 MG/1
0.25 TABLET ORAL 3 TIMES DAILY PRN
Qty: 30 TABLET | Refills: 1 | Status: SHIPPED | OUTPATIENT
Start: 2019-09-04 | End: 2020-06-04

## 2019-09-04 RX ORDER — MELOXICAM 15 MG/1
TABLET ORAL
Qty: 30 TABLET | Refills: 5 | Status: SHIPPED | OUTPATIENT
Start: 2019-09-04 | End: 2020-08-20

## 2019-09-04 RX ORDER — SUMATRIPTAN SUCCINATE 100 MG/1
100 TABLET ORAL ONCE
Qty: 18 TABLET | Refills: 3 | Status: SHIPPED | OUTPATIENT
Start: 2019-09-04 | End: 2019-10-11

## 2019-09-04 RX ORDER — ESTRADIOL 0.05 MG/D
1 FILM, EXTENDED RELEASE TRANSDERMAL
Qty: 24 PATCH | Refills: 3 | Status: SHIPPED | OUTPATIENT
Start: 2019-09-05 | End: 2020-02-04 | Stop reason: SDUPTHER

## 2019-09-08 NOTE — PROGRESS NOTES
Subjective:       Patient ID: Cheryle Olivares is a 49 y.o. female.    Chief Complaint: Follow-up (3 month f/u, )   She is having continued stress and anxiety  She is still having pain that originates in the left side of her neck and goes into the left side of her head  Can be very painful  Triggered by stress  She does not wish to take an SSRI   She needs have something on hand  This would be used only rarely  Will hold off on ordering repeat  MRI    Her  has ADD  She  herself been irritable recently  She stop indomethacin  Physical therapy has helped her immensely  She is off Topamax  HPI  Review of Systems   Constitutional: Negative for activity change, appetite change, chills, fatigue, fever and unexpected weight change.   HENT: Negative for hearing loss.    Eyes: Negative for visual disturbance.   Respiratory: Negative for cough, chest tightness, shortness of breath and wheezing.    Cardiovascular: Negative for chest pain, palpitations and leg swelling.   Gastrointestinal: Negative for abdominal pain, constipation, nausea and vomiting.   Genitourinary: Negative for dysuria, frequency and urgency.   Musculoskeletal: Negative for arthralgias, back pain, gait problem, joint swelling and myalgias.   Skin: Negative for rash.   Neurological: Negative for light-headedness and headaches.   Psychiatric/Behavioral: Negative for dysphoric mood and sleep disturbance. The patient is not nervous/anxious.        Objective:      Physical Exam   Constitutional: She appears well-nourished.   HENT:   Head: Atraumatic.   Eyes: Conjunctivae are normal. No scleral icterus.   Neck: Neck supple.   Cardiovascular: Normal rate and regular rhythm.   Pulmonary/Chest: Effort normal and breath sounds normal.   Abdominal: Soft. There is no tenderness.   Musculoskeletal: She exhibits no edema.   Lymphadenopathy:     She has no cervical adenopathy.   Neurological: She is alert.   Skin: Skin is warm and dry.   Psychiatric: She has a  normal mood and affect. Her behavior is normal.   Nursing note and vitals reviewed.      Assessment:       1. Status post vaginal hysterectomy    2. estrogen replacement therapy    3. Neck pain    4. High serum low-density lipoprotein (LDL)        Plan:   Cheryle was seen today for follow-up.    Diagnoses and all orders for this visit:    Status post vaginal hysterectomy    estrogen replacement therapy    Neck pain    High serum low-density lipoprotein (LDL)  -     Lipid panel; Future    Other orders  -     ALPRAZolam (XANAX) 0.25 MG tablet; Take 1 tablet (0.25 mg total) by mouth 3 (three) times daily as needed.  -     estradiol 0.05 mg/24 hr td ptsw (VIVELLE-DOT) 0.05 mg/24 hr; Place 1 patch onto the skin twice a week.  -     sumatriptan (IMITREX) 100 MG tablet; Take 1 tablet (100 mg total) by mouth once. for 1 dose  -     meloxicam (MOBIC) 15 MG tablet; One by mouth daily if needed for musculoskeletal pain    Follow up in about 3 months (around 12/4/2019).

## 2019-09-13 ENCOUNTER — PATIENT MESSAGE (OUTPATIENT)
Dept: NEUROLOGY | Facility: CLINIC | Age: 49
End: 2019-09-13

## 2019-09-18 ENCOUNTER — LAB VISIT (OUTPATIENT)
Dept: LAB | Facility: HOSPITAL | Age: 49
End: 2019-09-18
Attending: INTERNAL MEDICINE
Payer: COMMERCIAL

## 2019-09-18 DIAGNOSIS — R79.89 HIGH SERUM LOW-DENSITY LIPOPROTEIN (LDL): ICD-10-CM

## 2019-09-18 LAB
CHOLEST SERPL-MCNC: 239 MG/DL (ref 120–199)
CHOLEST/HDLC SERPL: 2.7 {RATIO} (ref 2–5)
HDLC SERPL-MCNC: 90 MG/DL (ref 40–75)
HDLC SERPL: 37.7 % (ref 20–50)
LDLC SERPL CALC-MCNC: 139.6 MG/DL (ref 63–159)
NONHDLC SERPL-MCNC: 149 MG/DL
TRIGL SERPL-MCNC: 47 MG/DL (ref 30–150)

## 2019-09-18 PROCEDURE — 80061 LIPID PANEL: CPT

## 2019-09-18 PROCEDURE — 36415 COLL VENOUS BLD VENIPUNCTURE: CPT

## 2019-09-25 ENCOUNTER — PATIENT MESSAGE (OUTPATIENT)
Dept: NEUROLOGY | Facility: CLINIC | Age: 49
End: 2019-09-25

## 2019-09-27 ENCOUNTER — PATIENT MESSAGE (OUTPATIENT)
Dept: NEUROLOGY | Facility: CLINIC | Age: 49
End: 2019-09-27

## 2019-10-07 ENCOUNTER — TELEPHONE (OUTPATIENT)
Dept: NEUROLOGY | Facility: CLINIC | Age: 49
End: 2019-10-07

## 2019-10-07 NOTE — TELEPHONE ENCOUNTER
----- Message from Osito Arroyo sent at 10/7/2019  8:53 AM CDT -----  Contact: Patient   Patient is requesting a phone call from the office about the right time for the patient to be seen     [phone]

## 2019-10-07 NOTE — TELEPHONE ENCOUNTER
Cheryle Olivares   to Sergio Granda MD            9/13/19 1:39 PM   I have been making much progress on my chronic migraines, due to physical therapy and dry needling. Muscle work has been key, but we are addressing decades of migraines, and I know it will take time. I'm mostly down to 1-2 migraines per week, but I still have weeks when I have daily migraines, usually upon waking or exacerbated by stress. I had significantly reduced all meds (including eliminating daily Topamax), but recently need to take 50mg / day of Imitrex, due to frequent recurring pain. I would like to see if Imitrex or another med is best for frequent use while I continue to follow through with physical therapy treatment, exercises, etc. Should I schedule an appointment, or is medicine adjustment best? If 50mg/day Imitrex is not a concerning amount, I will continue. Also, gabapentin was not effective. Mobic is sometimes helpful, though not to completely eliminate pain. My goal is to continue PT until I am down to only occasional need for medication. Thank you!

## 2019-10-09 ENCOUNTER — PATIENT OUTREACH (OUTPATIENT)
Dept: ADMINISTRATIVE | Facility: OTHER | Age: 49
End: 2019-10-09

## 2019-10-09 ENCOUNTER — TELEPHONE (OUTPATIENT)
Dept: NEUROLOGY | Facility: CLINIC | Age: 49
End: 2019-10-09

## 2019-10-09 NOTE — TELEPHONE ENCOUNTER
----- Message from Carly Martínez sent at 10/9/2019  8:13 AM CDT -----  Contact: Pt  Pt would like to be called back regarding health concerns.       Pt can be reached at [phone].

## 2019-10-09 NOTE — TELEPHONE ENCOUNTER
Appt booked 10/11/2019 @ 9:20 am to discuss my chart message 09/13/2019.    Fidelina/Tam,    Would you please open a 9:20 am appt slot for Dr. Granda?    Thanks,  Sherin

## 2019-10-11 ENCOUNTER — OFFICE VISIT (OUTPATIENT)
Dept: NEUROLOGY | Facility: CLINIC | Age: 49
End: 2019-10-11
Payer: COMMERCIAL

## 2019-10-11 VITALS
HEART RATE: 95 BPM | DIASTOLIC BLOOD PRESSURE: 87 MMHG | BODY MASS INDEX: 28.64 KG/M2 | WEIGHT: 167.75 LBS | SYSTOLIC BLOOD PRESSURE: 130 MMHG | HEIGHT: 64 IN

## 2019-10-11 DIAGNOSIS — M54.2 NECK PAIN: Primary | ICD-10-CM

## 2019-10-11 PROCEDURE — 3079F PR MOST RECENT DIASTOLIC BLOOD PRESSURE 80-89 MM HG: ICD-10-PCS | Mod: CPTII,S$GLB,, | Performed by: PSYCHIATRY & NEUROLOGY

## 2019-10-11 PROCEDURE — 3075F SYST BP GE 130 - 139MM HG: CPT | Mod: CPTII,S$GLB,, | Performed by: PSYCHIATRY & NEUROLOGY

## 2019-10-11 PROCEDURE — 3079F DIAST BP 80-89 MM HG: CPT | Mod: CPTII,S$GLB,, | Performed by: PSYCHIATRY & NEUROLOGY

## 2019-10-11 PROCEDURE — 3008F PR BODY MASS INDEX (BMI) DOCUMENTED: ICD-10-PCS | Mod: CPTII,S$GLB,, | Performed by: PSYCHIATRY & NEUROLOGY

## 2019-10-11 PROCEDURE — 3075F PR MOST RECENT SYSTOLIC BLOOD PRESS GE 130-139MM HG: ICD-10-PCS | Mod: CPTII,S$GLB,, | Performed by: PSYCHIATRY & NEUROLOGY

## 2019-10-11 PROCEDURE — 99213 PR OFFICE/OUTPT VISIT, EST, LEVL III, 20-29 MIN: ICD-10-PCS | Mod: S$GLB,,, | Performed by: PSYCHIATRY & NEUROLOGY

## 2019-10-11 PROCEDURE — 99213 OFFICE O/P EST LOW 20 MIN: CPT | Mod: S$GLB,,, | Performed by: PSYCHIATRY & NEUROLOGY

## 2019-10-11 PROCEDURE — 99999 PR PBB SHADOW E&M-EST. PATIENT-LVL III: ICD-10-PCS | Mod: PBBFAC,,, | Performed by: PSYCHIATRY & NEUROLOGY

## 2019-10-11 PROCEDURE — 3008F BODY MASS INDEX DOCD: CPT | Mod: CPTII,S$GLB,, | Performed by: PSYCHIATRY & NEUROLOGY

## 2019-10-11 PROCEDURE — 99999 PR PBB SHADOW E&M-EST. PATIENT-LVL III: CPT | Mod: PBBFAC,,, | Performed by: PSYCHIATRY & NEUROLOGY

## 2019-10-11 RX ORDER — TIZANIDINE 4 MG/1
4 TABLET ORAL NIGHTLY PRN
Qty: 20 TABLET | Refills: 12 | Status: SHIPPED | OUTPATIENT
Start: 2019-10-11 | End: 2019-11-10

## 2019-10-11 NOTE — PROGRESS NOTES
Follow up:   Stress -> Neck pain -> HA + nausea   overactivation of trap     Prior note:   Oct 2014 - diag with Hemicran cont (L) - pain points on the L side + photo/phono  Indomethacin 25mg PRN - helped   Currently 4/week Hz      Headache history:   Age of onset -  Childhood   Location - R or L   Nature of pain -  Throbbing   Prodrome - no   Aura -  No   Duration of headache - > 4 hrs   Time to peak intensity - hrs   Pain scale - range of intensity -  8/10  Frequency -  2-3/month   Status Migrainosus history - yes   Time of day of most headaches- anytime      Associated symptoms with the headache:   Meningeal symptoms - photophobia, phonophobia, exercise intolerance +   Nausea/vomitting+   Nasal drainage   Visual blurriness   Pallor/flushing  Dizziness +   Vertigo  Confusion  Impaired concentration +   Pain worsened with physical activity +   Neck pain   Cheek pain on R +      Cluster headache symptoms: none      Symptoms of increased intracranial pressure: none      Basilar migraine symptoms: none      Headache Triggers:   Stress +   Bright or flickering light  Strong smell  Vigorous exercise  Dietary factors   Visual strain   Weather changes +   Exertion  Heat (hot weather, hot baths or showers)  Menses     Other comorbid conditions:  Anxiety - yes   Motion sickness symptom - no      Treatment history:  Resolution of headache with sleep - yes   Meds tried:  H/o Renal stones   Fioricet, relpax, xanax   Flexeril   Medrol dose   maxalt - sluggish   Antiviral - not help   accupressure - helped   accupunture - helped   PT - helps   estradiol 0.05 mg/24 hr td ptsw (VIVELLE-DOT) 0.05 mg/24 hr  - twice weekly patch - helps with hot flashes   Topamax 25 mg - not help   Gabapentin 100 mg  - drowsy, not help    mobic - helped     Headache risk factors:   H/o TBI  - no   H/o Meningitis  - no   F/h Aneurysms  - no     Headache burden:   In the last three months:  Days missed from work = several   Days unable to perform  activities of daily living = 0  Days unable to attend social activities = several       Review of Systems   Constitutional: Negative.    HENT: Negative.    Eyes: Negative.    Respiratory: Negative.    Cardiovascular: Negative.    Gastrointestinal: Negative.    Endocrine: Negative.    Genitourinary: Negative.    Musculoskeletal: neck pain.    Skin: Negative.    Allergic/Immunologic: Negative.    Neurological: Negative.    Hematological: Negative.    Psychiatric/Behavioral: anxiety.        Objective:   Physical Exam   Constitutional: She is oriented to person, place, and time. She appears well-developed and well-nourished.   Psychiatric: She has a normal mood and affect. Her behavior is normal. Judgment and thought content normal.       Assessment:       1. Intractable migraine without aura and without status migrainosus    2. Neck pain    3. Tonsillar hernia into foramen magnum      Her HA are unlikely from a TAC and more likely from her tonsillar herniation and posture      MRI C spine  Impression       No focal disk abnormalities or significant central canal or neural foraminal stenosis.  ______________________________________     Electronically signed by resident: ELIUD LEVY MD  Date: 11/07/14  Time: 10:51      MRI brain  Impression        Approximate 3 mm of cerebellar tonsillar ectopia.    2-3 punctate foci of T2/flair signal hyperintensity frontal subcortical white matter which are nonspecific and of uncertain clinical significance.    Otherwise unremarkable MRI brain without evidence for acute infarction or enhancing lesion.    Electronically signed by: TRISH GOFF DO  Date: 10/24/14  Time: 14:27        Results for MILAGRO LAISHA BAE (MRN [id number]) as of 10/11/2019 09:44   Ref. Range 2/5/2019 10:19   Vitamin B-12 Latest Ref Range: 210 - 950 pg/mL >2000 (H)   Thiamine Latest Ref Range: 38 - 122 ug/L 45   Vit D, 25-Hydroxy Latest Ref Range: 30 - 96 ng/mL 75   Vitamin B2 Latest Ref Range: 1 - 19 mcg/L 18    Vitamin B6 Latest Ref Range: 5 - 50 ug/L 131 (H)   Antigliadin Ab IgG Latest Ref Range: <20 UNITS 2   Antigliadin Abs, IgA Latest Ref Range: <20 UNITS 7   TTG IgA Latest Ref Range: <20 UNITS 9   TTG IgG Latest Ref Range: <20 UNITS 3   Immunoglobulin A (IgA) Latest Ref Range: 70 - 400 mg/dL 172     Plan:   1, try pain cream   2, zanaflex 4 mg QHS PRN  3, cont PT   4, cont estrogen  5, Breakthrough headache - imitrex 100 mg, xanax, mobic    Consider EMGALITY   F/up 2-3 m     Patient verbalized understanding to plan. I answered all her questions to her satisfaction.

## 2019-10-29 ENCOUNTER — PATIENT OUTREACH (OUTPATIENT)
Dept: ADMINISTRATIVE | Facility: OTHER | Age: 49
End: 2019-10-29

## 2019-10-31 ENCOUNTER — OFFICE VISIT (OUTPATIENT)
Dept: OPTOMETRY | Facility: CLINIC | Age: 49
End: 2019-10-31
Payer: COMMERCIAL

## 2019-10-31 DIAGNOSIS — H52.4 PRESBYOPIA OF BOTH EYES: ICD-10-CM

## 2019-10-31 DIAGNOSIS — Z01.00 EYE EXAM, ROUTINE: Primary | ICD-10-CM

## 2019-10-31 PROCEDURE — 92014 PR EYE EXAM, EST PATIENT,COMPREHESV: ICD-10-PCS | Mod: S$GLB,,, | Performed by: OPTOMETRIST

## 2019-10-31 PROCEDURE — 99999 PR PBB SHADOW E&M-EST. PATIENT-LVL III: CPT | Mod: PBBFAC,,, | Performed by: OPTOMETRIST

## 2019-10-31 PROCEDURE — 99999 PR PBB SHADOW E&M-EST. PATIENT-LVL III: ICD-10-PCS | Mod: PBBFAC,,, | Performed by: OPTOMETRIST

## 2019-10-31 PROCEDURE — 92015 DETERMINE REFRACTIVE STATE: CPT | Mod: S$GLB,,, | Performed by: OPTOMETRIST

## 2019-10-31 PROCEDURE — 92014 COMPRE OPH EXAM EST PT 1/>: CPT | Mod: S$GLB,,, | Performed by: OPTOMETRIST

## 2019-10-31 PROCEDURE — 92015 PR REFRACTION: ICD-10-PCS | Mod: S$GLB,,, | Performed by: OPTOMETRIST

## 2019-10-31 NOTE — PROGRESS NOTES
HPI     Annual eye exam no vision issues  Pt sts OS blurry sometimes she describes it as a spot left of center.    Last edited by Vivi Bah PCT on 10/31/2019  8:40 AM. (History)            Assessment /Plan     For exam results, see Encounter Report.    Eye exam, routine  -PVD stable with prior.  OCT, Nirali WNL  -Eyemed    Presbyopia of both eyes  Eyeglass Final Rx     Eyeglass Final Rx       Sphere Cylinder Add    Right [place] Sphere +1.75    Left [place] Sphere +1.75    Type:  PAL    Expiration Date:  10/31/2020              Optional PALs    RTC 1 yr

## 2019-12-04 ENCOUNTER — OFFICE VISIT (OUTPATIENT)
Dept: INTERNAL MEDICINE | Facility: CLINIC | Age: 49
End: 2019-12-04
Payer: COMMERCIAL

## 2019-12-04 VITALS
SYSTOLIC BLOOD PRESSURE: 134 MMHG | WEIGHT: 167.31 LBS | HEART RATE: 84 BPM | DIASTOLIC BLOOD PRESSURE: 74 MMHG | OXYGEN SATURATION: 98 % | HEIGHT: 64 IN | BODY MASS INDEX: 28.56 KG/M2

## 2019-12-04 DIAGNOSIS — Z90.710 STATUS POST VAGINAL HYSTERECTOMY: ICD-10-CM

## 2019-12-04 DIAGNOSIS — G93.5: ICD-10-CM

## 2019-12-04 DIAGNOSIS — M54.2 NECK PAIN: ICD-10-CM

## 2019-12-04 DIAGNOSIS — Z71.89 COUNSELING FOR ESTROGEN REPLACEMENT THERAPY: Primary | ICD-10-CM

## 2019-12-04 DIAGNOSIS — G43.019 INTRACTABLE MIGRAINE WITHOUT AURA AND WITHOUT STATUS MIGRAINOSUS: ICD-10-CM

## 2019-12-04 PROCEDURE — 3008F BODY MASS INDEX DOCD: CPT | Mod: CPTII,S$GLB,, | Performed by: INTERNAL MEDICINE

## 2019-12-04 PROCEDURE — 99999 PR PBB SHADOW E&M-EST. PATIENT-LVL III: ICD-10-PCS | Mod: PBBFAC,,, | Performed by: INTERNAL MEDICINE

## 2019-12-04 PROCEDURE — 99213 OFFICE O/P EST LOW 20 MIN: CPT | Mod: S$GLB,,, | Performed by: INTERNAL MEDICINE

## 2019-12-04 PROCEDURE — 3008F PR BODY MASS INDEX (BMI) DOCUMENTED: ICD-10-PCS | Mod: CPTII,S$GLB,, | Performed by: INTERNAL MEDICINE

## 2019-12-04 PROCEDURE — 3075F PR MOST RECENT SYSTOLIC BLOOD PRESS GE 130-139MM HG: ICD-10-PCS | Mod: CPTII,S$GLB,, | Performed by: INTERNAL MEDICINE

## 2019-12-04 PROCEDURE — 3075F SYST BP GE 130 - 139MM HG: CPT | Mod: CPTII,S$GLB,, | Performed by: INTERNAL MEDICINE

## 2019-12-04 PROCEDURE — 3078F PR MOST RECENT DIASTOLIC BLOOD PRESSURE < 80 MM HG: ICD-10-PCS | Mod: CPTII,S$GLB,, | Performed by: INTERNAL MEDICINE

## 2019-12-04 PROCEDURE — 3078F DIAST BP <80 MM HG: CPT | Mod: CPTII,S$GLB,, | Performed by: INTERNAL MEDICINE

## 2019-12-04 PROCEDURE — 99213 PR OFFICE/OUTPT VISIT, EST, LEVL III, 20-29 MIN: ICD-10-PCS | Mod: S$GLB,,, | Performed by: INTERNAL MEDICINE

## 2019-12-04 PROCEDURE — 99999 PR PBB SHADOW E&M-EST. PATIENT-LVL III: CPT | Mod: PBBFAC,,, | Performed by: INTERNAL MEDICINE

## 2019-12-05 NOTE — PROGRESS NOTES
Subjective:       Patient ID: Cheryle Olivares is a 49 y.o. female.    Chief Complaint: Follow-up and bronchial spasms   She is doing much better on the current dose of estrogen replacement therapy.  When she takes a deep breath sometimes she feels like she has a tightness in her chest and she is going to cough.  She has not heard herself wheeze.  Her night sweats are much less  She is controlling nasal congestion with Flonase  HPI  Review of Systems   Constitutional: Negative.  Negative for activity change, appetite change, chills, fatigue, fever and unexpected weight change.   HENT: Negative for hearing loss and tinnitus.    Eyes: Negative for visual disturbance.   Respiratory: Positive for chest tightness. Negative for cough, shortness of breath and wheezing.    Cardiovascular: Negative.  Negative for chest pain, palpitations and leg swelling.   Gastrointestinal: Negative for abdominal pain, blood in stool, constipation, diarrhea and nausea.   Genitourinary: Negative for dysuria, frequency and urgency.   Musculoskeletal: Negative for back pain and neck stiffness.   Skin: Negative for rash.   Neurological: Negative for headaches.   Psychiatric/Behavioral: Negative for dysphoric mood and sleep disturbance. The patient is not nervous/anxious.        Objective:      Physical Exam   Constitutional: She is oriented to person, place, and time. She appears well-nourished. No distress.   HENT:   Head: Normocephalic and atraumatic.   Right Ear: External ear normal.   Left Ear: External ear normal.   Nose: Nose normal.   Mouth/Throat: No oropharyngeal exudate.   Eyes: Conjunctivae are normal. No scleral icterus.   Neck: Neck supple.   Cardiovascular: Normal rate, regular rhythm and normal heart sounds.   Pulmonary/Chest: Effort normal and breath sounds normal. No stridor. No respiratory distress. She has no wheezes. She has no rales. She exhibits no tenderness.   Abdominal: Soft. There is no tenderness.    Musculoskeletal: She exhibits no edema.   Lymphadenopathy:     She has no cervical adenopathy.   Neurological: She is alert and oriented to person, place, and time. She exhibits normal muscle tone. Coordination normal.   Skin: Skin is warm and dry.   Psychiatric: She has a normal mood and affect. Her behavior is normal.   Nursing note and vitals reviewed.      Assessment:       1. estrogen replacement therapy    2. Status post vaginal hysterectomy    3. Intractable migraine without aura and without status migrainosus    4. Neck pain    5. Tonsillar hernia into foramen magnum        Plan:   Cheryle was seen today for follow-up and bronchial spasms.    Diagnoses and all orders for this visit:    estrogen replacement therapy. overall she is doing very well.  I do not hear any wheezing today.  She is given a peak flow and her peak flow is 400.  She will monitor this and report any difficulties    Status post vaginal hysterectomy    Intractable migraine without aura and without status migrainosus    Neck pain    Tonsillar hernia into foramen magnum

## 2019-12-26 ENCOUNTER — OFFICE VISIT (OUTPATIENT)
Dept: URGENT CARE | Facility: CLINIC | Age: 49
End: 2019-12-26
Payer: COMMERCIAL

## 2019-12-26 ENCOUNTER — PATIENT MESSAGE (OUTPATIENT)
Dept: INTERNAL MEDICINE | Facility: CLINIC | Age: 49
End: 2019-12-26

## 2019-12-26 VITALS
TEMPERATURE: 98 F | RESPIRATION RATE: 17 BRPM | SYSTOLIC BLOOD PRESSURE: 143 MMHG | DIASTOLIC BLOOD PRESSURE: 82 MMHG | OXYGEN SATURATION: 98 % | HEIGHT: 64 IN | HEART RATE: 87 BPM | BODY MASS INDEX: 28.17 KG/M2 | WEIGHT: 165 LBS

## 2019-12-26 DIAGNOSIS — R05.9 COUGH: ICD-10-CM

## 2019-12-26 DIAGNOSIS — T36.95XA ANTIBIOTIC-ASSOCIATED DIARRHEA: ICD-10-CM

## 2019-12-26 DIAGNOSIS — H93.8X3 CONGESTION OF BOTH EARS: ICD-10-CM

## 2019-12-26 DIAGNOSIS — J01.90 ACUTE BACTERIAL SINUSITIS: Primary | ICD-10-CM

## 2019-12-26 DIAGNOSIS — J30.9 ALLERGIC RHINITIS, UNSPECIFIED SEASONALITY, UNSPECIFIED TRIGGER: ICD-10-CM

## 2019-12-26 DIAGNOSIS — B96.89 ACUTE BACTERIAL SINUSITIS: Primary | ICD-10-CM

## 2019-12-26 DIAGNOSIS — K52.1 ANTIBIOTIC-ASSOCIATED DIARRHEA: ICD-10-CM

## 2019-12-26 PROCEDURE — 99214 OFFICE O/P EST MOD 30 MIN: CPT | Mod: S$GLB,,, | Performed by: NURSE PRACTITIONER

## 2019-12-26 PROCEDURE — 99214 PR OFFICE/OUTPT VISIT, EST, LEVL IV, 30-39 MIN: ICD-10-PCS | Mod: S$GLB,,, | Performed by: NURSE PRACTITIONER

## 2019-12-26 RX ORDER — AMOXICILLIN AND CLAVULANATE POTASSIUM 875; 125 MG/1; MG/1
1 TABLET, FILM COATED ORAL 2 TIMES DAILY
Qty: 14 TABLET | Refills: 0 | Status: SHIPPED | OUTPATIENT
Start: 2019-12-26 | End: 2020-01-02

## 2019-12-26 NOTE — PATIENT INSTRUCTIONS
Return to Urgent Care or go to ER if symptoms worsen or fail to improve.  Follow up with PCP as recommended for further management.     Use Flonase or Nasacort 1-2 sprays/nostril per day. It is a local acting steroid nasal spray, if you develop a bloody nose, stop using the medication immediately.    Use Nasal Saline to mechanically move any post nasal drip from your eustachian tube or from the back of your throat. Use the nasal saline prior to using any topical nasal sprays to help clear the mucus so the medication is able to get to the mucus membranes.      Use Afrin in each nare for no longer than 5 days, as it is addictive. It can also dry out your mucous membranes and cause elevated blood pressure.    Use pseudoephedrine (behind the counter) to decongest-- (the little red pills).  Pseudoephedrine 30 mg up to 240 mg /day. It can raise your blood pressure and give you palpitations.  Do not buy any oral medications with phenylephrine as it has not been proven effective as a decongestant at the OTC dose.     Take Ibuprofen or Acetaminophen according to label instructions for fever, throat pain, headache, and body aches    Use warm salt water gargles to ease your throat pain. Warm salt water gargles as needed for sore throat-  1/2 tsp salt to 1 cup warm water, gargle as desired.    Sometimes Nyquil at night is beneficial to help you get some rest, however it is sedating and does have an antihistamine, as well as tylenol.  The Nyquil behind the pharmacy counter also has the decongestant, pseudoephedrine as an additional ingredient.     Acute Bacterial Rhinosinusitis (ABRS)    Acute bacterial rhinosinusitis (ABRS) is an infection of your nasal cavity and sinuses. Its caused by bacteria. Acute means that youve had symptoms for less than 12 weeks.  Understanding your sinuses  The nasal cavity is the large air-filled space behind your nose. The sinuses are a group of spaces formed by the bones of your face. They  connect with your nasal cavity. ABRS causes the tissue lining these spaces to become inflamed. Mucus may not drain normally. This leads to facial pain and other symptoms.  What causes ABRS?  ABRS most often follows an upper respiratory infection caused by a virus. Bacteria then infect the lining of your nasal cavity and sinuses. But you can also get ABRS if you have:  · Nasal allergies  · Long-term nasal swelling and congestion not caused by allergies  · Blockage in the nose  Symptoms of ABRS  The symptoms of ABRS may be different for each person, and can include:  · Nasal congestion  · Runny nose  · Fluid draining from the nose down the throat (postnasal drip)  · Headache  · Cough  · Pain in the sinuses  · Thick, colored fluid from the nose (mucus)  · Fever  Diagnosing ABRS  ABRS may be diagnosed if youve had an upper respiratory infection like a cold and cough for longer than 10 to 14 days. Your health care provider will ask about your symptoms and your medical history. The provider will check your vital signs, including your temperature. Youll have a physical exam. The health care provider will check your ears, nose, and throat. You likely wont need any tests. If ABRS comes back, you may have a culture or other tests.  Treatment for ABRS  Treatment may include:  · Antibiotic medicine. This is for symptoms that last for at least 10 to 14 days.  · Nasal corticosteroid medicine. Drops or spray used in the nose can lessen swelling and congestion.  · Over-the-counter pain medicine. This is to lessen sinus pain and pressure.  · Nasal decongestant medicine. Spray or drops may help to lessen congestion. Do not use them for more than a few days.  · Salt wash (saline irrigation). This can help to loosen mucus.  Possible complications of ABRS  ABRS may come back or become long-term (chronic).  In rare cases, ABRS may cause complications such as:   · Inflamed tissue around the brain and spinal cord  (meningitis)  · Inflamed tissue around the eyes (orbital cellulitis)  · Inflamed bones around the sinuses (osteitis)  These problems may need to be treated in a hospital with intravenous (IV) antibiotic medicine or surgery.  When to call the health care provider  Call your health care provider if you have any of the following:  · Symptoms that dont get better, or get worse  · Symptoms that dont get better after 3 to 5 days on antibiotics  · Trouble seeing  · Swelling around your eyes  · Confusion or trouble staying awake   Date Last Reviewed: 3/3/2015  © 1611-5118 [employer]. [address] [place] [address]. All rights reserved. This information is not intended as a substitute for professional medical care. Always follow your healthcare professional's instructions.

## 2019-12-26 NOTE — PROGRESS NOTES
"Subjective:       Patient ID: Cheryle Olivares is a 49 y.o. female.    Vitals:  height is 5' 4" (1.626 m) and weight is 74.8 kg (165 lb). Her oral temperature is 97.9 °F (36.6 °C). Her blood pressure is 143/82 (abnormal) and her pulse is 87. Her respiration is 17 and oxygen saturation is 98%.     Chief Complaint: Sinus Problem    This is a 49 y.o. female who presents today with a chief complaint of   Cough, congestion, head aches, hoarse voice, and sinus pressure. Pt taking Zyrtec, flonase, and OTC cough meds. Sx for 2 weeks     Sinus Problem   This is a new problem. The current episode started 1 to 4 weeks ago. The problem has been gradually worsening since onset. There has been no fever. She is experiencing no pain. Associated symptoms include chills, congestion, coughing, ear pain, headaches, a hoarse voice and sinus pressure. Pertinent negatives include no diaphoresis, shortness of breath or sore throat. Past treatments include oral decongestants (zyrtec, flonase, cough med ). The treatment provided mild relief.       Constitution: Positive for chills. Negative for sweating, fatigue and fever.   HENT: Positive for ear pain, congestion, postnasal drip, sinus pressure and voice change. Negative for sinus pain and sore throat.    Neck: Negative for painful lymph nodes.   Eyes: Negative for eye redness.   Respiratory: Positive for cough. Negative for chest tightness, sputum production, bloody sputum, COPD, shortness of breath, stridor, wheezing and asthma.    Gastrointestinal: Negative for nausea and vomiting.   Musculoskeletal: Negative for muscle ache.   Skin: Negative for rash.   Allergic/Immunologic: Negative for seasonal allergies and asthma.   Neurological: Positive for headaches.   Hematologic/Lymphatic: Negative for swollen lymph nodes.       Objective:      Physical Exam      Assessment:       1. Acute bacterial sinusitis    2. Allergic rhinitis, unspecified seasonality, unspecified trigger    3. Cough  "   4. Congestion of both ears    5. Antibiotic-associated diarrhea        Plan:         Acute bacterial sinusitis  -     amoxicillin-clavulanate 875-125mg (AUGMENTIN) 875-125 mg per tablet; Take 1 tablet by mouth 2 (two) times daily. for 7 days  Dispense: 14 tablet; Refill: 0    Allergic rhinitis, unspecified seasonality, unspecified trigger    Cough    Congestion of both ears    Antibiotic-associated diarrhea  -     Bifidobacterium infantis 4 mg Cap; Take 1 capsule (4 mg total) by mouth once daily. for 14 days  Dispense: 14 capsule; Refill: 0    pt. Reports she had an anxiety reaction the last time she had a steroid shot and oral prednisone-- will defer steroid shot at this time.

## 2019-12-27 ENCOUNTER — TELEPHONE (OUTPATIENT)
Dept: PRIMARY CARE CLINIC | Facility: CLINIC | Age: 49
End: 2019-12-27

## 2019-12-27 NOTE — TELEPHONE ENCOUNTER
----- Message from Trip Robles sent at 12/27/2019  2:29 PM CST -----  Contact: Pt   Pt would like to be called back regarding  Appointment Request From: Cheryle Olivares    With Provider: Any Kinsey MD [[facility]]    Preferred Date Range: 12/26/2019 - 12/26/2019    Preferred Times: Any time    Reason for visit: Cold, cough, earache    Comments:  Check ears, lungs, sinuses. Relief for cough (especially at night)        Pt can be reached at  [phone].    Thank You.

## 2020-02-03 ENCOUNTER — PATIENT OUTREACH (OUTPATIENT)
Dept: ADMINISTRATIVE | Facility: OTHER | Age: 50
End: 2020-02-03

## 2020-02-03 DIAGNOSIS — Z12.31 ENCOUNTER FOR SCREENING MAMMOGRAM FOR MALIGNANT NEOPLASM OF BREAST: Primary | ICD-10-CM

## 2020-02-04 ENCOUNTER — OFFICE VISIT (OUTPATIENT)
Dept: OBSTETRICS AND GYNECOLOGY | Facility: CLINIC | Age: 50
End: 2020-02-04
Payer: COMMERCIAL

## 2020-02-04 VITALS
WEIGHT: 166.25 LBS | SYSTOLIC BLOOD PRESSURE: 120 MMHG | BODY MASS INDEX: 28.38 KG/M2 | HEIGHT: 64 IN | DIASTOLIC BLOOD PRESSURE: 90 MMHG

## 2020-02-04 DIAGNOSIS — Z79.890 HORMONE REPLACEMENT THERAPY (HRT): ICD-10-CM

## 2020-02-04 DIAGNOSIS — N95.9 MENOPAUSAL AND PERIMENOPAUSAL DISORDER: ICD-10-CM

## 2020-02-04 DIAGNOSIS — Z01.419 VISIT FOR GYNECOLOGIC EXAMINATION: Primary | ICD-10-CM

## 2020-02-04 PROCEDURE — 3074F PR MOST RECENT SYSTOLIC BLOOD PRESSURE < 130 MM HG: ICD-10-PCS | Mod: CPTII,S$GLB,, | Performed by: OBSTETRICS & GYNECOLOGY

## 2020-02-04 PROCEDURE — 3080F DIAST BP >= 90 MM HG: CPT | Mod: CPTII,S$GLB,, | Performed by: OBSTETRICS & GYNECOLOGY

## 2020-02-04 PROCEDURE — 99396 PR PREVENTIVE VISIT,EST,40-64: ICD-10-PCS | Mod: S$GLB,,, | Performed by: OBSTETRICS & GYNECOLOGY

## 2020-02-04 PROCEDURE — 99396 PREV VISIT EST AGE 40-64: CPT | Mod: S$GLB,,, | Performed by: OBSTETRICS & GYNECOLOGY

## 2020-02-04 PROCEDURE — 99999 PR PBB SHADOW E&M-EST. PATIENT-LVL III: CPT | Mod: PBBFAC,,, | Performed by: OBSTETRICS & GYNECOLOGY

## 2020-02-04 PROCEDURE — 99999 PR PBB SHADOW E&M-EST. PATIENT-LVL III: ICD-10-PCS | Mod: PBBFAC,,, | Performed by: OBSTETRICS & GYNECOLOGY

## 2020-02-04 PROCEDURE — 3080F PR MOST RECENT DIASTOLIC BLOOD PRESSURE >= 90 MM HG: ICD-10-PCS | Mod: CPTII,S$GLB,, | Performed by: OBSTETRICS & GYNECOLOGY

## 2020-02-04 PROCEDURE — 3074F SYST BP LT 130 MM HG: CPT | Mod: CPTII,S$GLB,, | Performed by: OBSTETRICS & GYNECOLOGY

## 2020-02-04 RX ORDER — SUMATRIPTAN SUCCINATE 100 MG/1
TABLET ORAL
COMMUNITY
Start: 2020-01-20 | End: 2020-08-10

## 2020-02-04 RX ORDER — ESTRADIOL 0.05 MG/D
1 FILM, EXTENDED RELEASE TRANSDERMAL
Qty: 24 PATCH | Refills: 3 | Status: SHIPPED | OUTPATIENT
Start: 2020-02-06 | End: 2020-02-28

## 2020-02-04 NOTE — PROGRESS NOTES
HISTORY OF PRESENT ILLNESS:    Cheryle Olivares is a 49 y.o. female,   presents today for her routine visit and has no complaints.  MAMMO HAS BEEN ORDERED.  REFILL VIVELLE DOT - TWICE A WEEK.  WOULD NOT LEAVE ONE ON WHEN APPLYING NEXT PATCH.  H/A MUCH IMPROVED AFTER SAW TIANA AND UNDERWENT PT FOR MUSCULAR TENSION    LAST 2019:  MAMMO DUE MAY, ORDERED. HAS BEEN HAVING HEADACHES FOR YEARS, SAW [facility] PROVID[facility] RECENTLY AND FOUND SOME RELIEF WITH ACCUPRESSURE AND ACUPUNCTURE.  WAS VERY EXPENSIVE, NEEDS REF TO NEUROLOGIST SO WILL EXECUTE AND REFILL MEDS FOR A MONTH UNTIL CAN BE SEEN BY NEURO OR HER PCP.  ALSO HAS HAD SOME NIGHT SWEATS, HOT FLUSHES AND TENORIO - NOT ALL MONTHS - DISCUSED PERIMENOPAUSE.  PHYTOESTROGENS OTC AND WILL CHECK FSH LEVEL.  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  NO GYN ISSUES  LAST VISIT 2016:   MAMMO LAST April, UP TO DATE.  PAP NOT INDICATED.  BREASTS MORE TENDER BUT HAS INCREASED HER INTAKE OF CAFFEINE LATELY - DISCUSSED.  REASSURED NO DOMINANT MASS PALPATED  LAST VISIT :  TVH BILATERAL SALPINGECTOMY 12/10/2015. There are no complaints, and both the procedure and the hospital course were uncomplicated.  NO FEVERS, SIGNIFICANT BLEEDING, AND ONLY CRAMPY PAIN. HAS HAD SOME INTERMITTENT PALPITATIONS, NO CP AND NO SOB. WILL VERIFY NL EKG TODAY AND REF FOR EVAL CARDIOLOGY NEXT AVAILABLE. HAD SIMILAR SX WHEN READMITTED PP LAST DELIVERY WITH PREECLAMPSIA BUT NO HTN  The pathology revealed:  UTERUS: CERVIX-NO SIGNIFICANT HISTOLOGICAL ABNORMALITY, NO EVIDENCE OF DYSPLASIA;  ENDOMETRIUM-SECRETORY PHASE;  MYOMETRIUM-LEIOMYOMA;  SEROSA-NO SIGNIFICANT HISTOLOGICAL ABNORMALITY.  FALLOPIAN TUBES (2): PARATUBAL CYST, NO OTHER SIGNIFICANT HISTOLOGICAL ABNORMALITY    Past Medical History:   Diagnosis Date    Allergy      Anemia     History of migraine headaches     Migraine headache     Morbid obesity     Preeclampsia, severe 7/15/2013       Past Surgical History:   Procedure Laterality Date    HYSTERECTOMY      TVH WITHOUT OOPHORECTOMY BLEEDING 12/2015 EK    left knee surgery         MEDICATIONS AND ALLERGIES:      Current Outpatient Medications:     ALPRAZolam (XANAX) 0.25 MG tablet, Take 1 tablet (0.25 mg total) by mouth 3 (three) times daily as needed., Disp: 30 tablet, Rfl: 1    cetirizine (ZYRTEC) 10 MG tablet, Take 10 mg by mouth once daily.  , Disp: , Rfl:     [START ON 2/6/2020] estradiol 0.05 mg/24 hr td ptsw (VIVELLE-DOT) 0.05 mg/24 hr, Place 1 patch onto the skin twice a week., Disp: 24 patch, Rfl: 3    MAGNESIUM OXIDE ORAL, Take 1 tablet by mouth., Disp: , Rfl:     meloxicam (MOBIC) 15 MG tablet, One by mouth daily if needed for musculoskeletal pain (Patient not taking: Reported on 2/4/2020), Disp: 30 tablet, Rfl: 5    multivitamin capsule, Take 1 capsule by mouth once daily., Disp: , Rfl:     sumatriptan (IMITREX) 100 MG tablet, , Disp: , Rfl:     Review of patient's allergies indicates:  No Known Allergies    Family History   Problem Relation Age of Onset    Skin cancer Father     Melanoma Father     Skin cancer Brother     Melanoma Brother     Colon cancer Maternal Uncle     Miscarriages / Stillbirths Sister     Retinal detachment Mother     Macular degeneration Mother     Hypertension Mother     Hypertension Maternal Grandmother     Macular degeneration Maternal Grandfather     Cataracts Maternal Grandfather     Heart attack Maternal Grandfather     Heart disease Maternal Grandfather     Coronary artery disease Paternal Grandfather     No Known Problems Maternal Aunt     No Known Problems Paternal Aunt     No Known Problems Paternal Uncle     No Known Problems Paternal Grandmother     Amblyopia Neg Hx     Blindness Neg Hx     Cancer Neg Hx     Diabetes Neg Hx     Glaucoma Neg  Hx     Strabismus Neg Hx     Stroke Neg Hx     Thyroid disease Neg Hx     Breast cancer Neg Hx     Ovarian cancer Neg Hx        Social History     Socioeconomic History    Marital status:      Spouse name: Not on file    Number of children: Not on file    Years of education: Not on file    Highest education level: Not on file   Occupational History    Occupation: director     Employer: other/[place]   Social Needs    Financial resource strain: Not on file    Food insecurity:     Worry: Not on file     Inability: Not on file    Transportation needs:     Medical: Not on file     Non-medical: Not on file   Tobacco Use    Smoking status: Never Smoker    Smokeless tobacco: Never Used   Substance and Sexual Activity    Alcohol use: No    Drug use: No    Sexual activity: Yes     Partners: Male     Birth control/protection: None   Lifestyle    Physical activity:     Days per week: Not on file     Minutes per session: Not on file    Stress: Not on file   Relationships    Social connections:     Talks on phone: Not on file     Gets together: Not on file     Attends Gnosticism service: Not on file     Active member of club or organization: Not on file     Attends meetings of clubs or organizations: Not on file     Relationship status: Not on file   Other Topics Concern    Are you pregnant or think you may be? 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  She is self-employed.         Her  is the  of a [employer] school on the [facility] [employer] which has 1000 students ages K. Through 8.  She  in 2010.  They have 2 daughters.    She had a previous marriage which ended in divorce       COMPREHENSIVE GYN HISTORY:  PAP History: Denies abnormal Paps except as noted above.  Infection History: Denies STDs.  "Denies PID.  Benign History: Denies uterine fibroids. Denies ovarian cysts. Denies endometriosis. Denies other conditions.  Cancer History: Denies cervical cancer. Denies uterine cancer or hyperplasia. Denies ovarian cancer. Denies vulvar cancer or pre-cancer. Denies vaginal cancer or pre-cancer. Denies breast cancer. Denies colon cancer.  Menstrual History: Denies menses.     ROS:  GENERAL: No weight changes. No swelling. No fatigue. No fever.  CARDIOVASCULAR: No chest pain. No shortness of breath. No leg cramps.   NEUROLOGICAL: No headaches. No vision changes.  BREASTS: No pain. No lumps. No discharge.  ABDOMEN: No pain. No nausea. No vomiting. No diarrhea. No constipation.  REPRODUCTIVE: No abnormal bleeding.  VULVA: No pain. No lesions. No itching.  VAGINA: No relaxation. No itching. No odor. No discharge. No lesions.  URINARY: No incontinence. No nocturia. No frequency. No dysuria.    BP (!) 120/90   Ht 5' 4" (1.626 m)   Wt 75.4 kg (166 lb 3.6 oz)   LMP 11/27/2015 (Exact Date)   BMI 28.53 kg/m²     PE:  APPEARANCE: Well nourished, well developed, in no acute distress.  AFFECT: WNL, alert and oriented x 3.  SKIN: No acne or hirsutism.  NECK: Neck symmetric without masses or thyromegaly.  NODES: No inguinal, cervical, axillary or femoral lymph node enlargement.  CHEST: Good respiratory effort.   ABDOMEN: Soft. No tenderness or masses. No hepatosplenomegaly. No hernias.  BREASTS: Symmetrical, no skin changes or visible lesions. No palpable masses, nipple discharge bilaterally.  PELVIC: ATROPHIC EXTERNAL FEMALE GENITALIA without lesions. Normal hair distribution. Adequate perineal body, normal urethral meatus. VAGINA DRY without lesions or discharge. No significant cystocele or rectocele. Bimanual exam shows CERVIX and UTERUS to be SURGICALLY ABSENT. Adnexa without masses or tenderness.  EXTREMITIES: No edema.    DIAGNOSIS:  1. Visit for gynecologic examination     2. Menopausal and perimenopausal disorder   "   3. Hormone replacement therapy (HRT)         LABS AND TESTS ORDERED:  Mammogram    MEDICATIONS PRESCRIBED:    COUNSELING:  The patient was counseled today on osteoporosis prevention, calcium supplementation, and regular weight bearing exercise. The patient was also counseled today on ACS PAP guidelines, with recommendations for yearly pelvic exams unless their uterus, cervix, and ovaries were removed for benign reasons; in that case, examinations every 3-5 years are recommended.  The patient was also counseled regarding monthly breast self-examination, routine STD screening for at-risk populations, prophylactic immunizations for transmitted infections such as  HPV, Pertussis, or Influenza as appropriate, and yearly mammograms when indicated by ACS guidelines.  She was advised to see her primary care physician for all other health maintenance.  FOLLOW-UP with me for next routine visit.

## 2020-02-04 NOTE — PROGRESS NOTES
Chart Reviewed  Care Everywhere updated  Immunizations reconciled  Health Maintenance updated  Orders placed mammogram  Upcoming Appts: n/a

## 2020-02-12 ENCOUNTER — HOSPITAL ENCOUNTER (OUTPATIENT)
Dept: RADIOLOGY | Facility: HOSPITAL | Age: 50
Discharge: HOME | End: 2020-02-12
Attending: INTERNAL MEDICINE
Payer: COMMERCIAL

## 2020-02-12 DIAGNOSIS — Z12.31 ENCOUNTER FOR SCREENING MAMMOGRAM FOR MALIGNANT NEOPLASM OF BREAST: ICD-10-CM

## 2020-02-12 PROCEDURE — 77067 SCR MAMMO BI INCL CAD: CPT | Mod: 26,,, | Performed by: RADIOLOGY

## 2020-02-12 PROCEDURE — 77063 BREAST TOMOSYNTHESIS BI: CPT | Mod: 26,,, | Performed by: RADIOLOGY

## 2020-02-12 PROCEDURE — 77067 MAMMO DIGITAL SCREENING BILAT WITH TOMOSYNTHESIS_CAD: ICD-10-PCS | Mod: 26,,, | Performed by: RADIOLOGY

## 2020-02-12 PROCEDURE — 77063 MAMMO DIGITAL SCREENING BILAT WITH TOMOSYNTHESIS_CAD: ICD-10-PCS | Mod: 26,,, | Performed by: RADIOLOGY

## 2020-02-12 PROCEDURE — 77067 SCR MAMMO BI INCL CAD: CPT | Mod: TC

## 2020-02-28 RX ORDER — ESTRADIOL 0.05 MG/D
FILM, EXTENDED RELEASE TRANSDERMAL
Qty: 24 PATCH | Refills: 3 | Status: SHIPPED | OUTPATIENT
Start: 2020-02-28 | End: 2020-11-10 | Stop reason: ALTCHOICE

## 2020-03-04 ENCOUNTER — PATIENT MESSAGE (OUTPATIENT)
Dept: NEUROLOGY | Facility: CLINIC | Age: 50
End: 2020-03-04

## 2020-03-16 ENCOUNTER — PATIENT MESSAGE (OUTPATIENT)
Dept: INTERNAL MEDICINE | Facility: CLINIC | Age: 50
End: 2020-03-16

## 2020-03-16 RX ORDER — FLUTICASONE PROPIONATE 50 MCG
2 SPRAY, SUSPENSION (ML) NASAL DAILY
Qty: 16 G | Refills: 12 | Status: SHIPPED | OUTPATIENT
Start: 2020-03-16 | End: 2020-04-15

## 2020-03-17 ENCOUNTER — PATIENT MESSAGE (OUTPATIENT)
Dept: INTERNAL MEDICINE | Facility: CLINIC | Age: 50
End: 2020-03-17

## 2020-03-17 RX ORDER — MONTELUKAST SODIUM 10 MG/1
10 TABLET ORAL DAILY
Qty: 90 TABLET | Refills: 3 | Status: SHIPPED | OUTPATIENT
Start: 2020-03-17 | End: 2020-07-21

## 2020-06-04 RX ORDER — ALPRAZOLAM 0.25 MG/1
0.25 TABLET ORAL 3 TIMES DAILY PRN
Qty: 30 TABLET | Refills: 3 | Status: SHIPPED | OUTPATIENT
Start: 2020-06-04 | End: 2021-10-16 | Stop reason: SDUPTHER

## 2020-06-04 NOTE — PROGRESS NOTES
Refill Routing Note    Medication(s) are not appropriate for processing by [facility]:       Outside of protocol           Medication reconciliation completed: No      Automatic Epic Protocol Generated Data:    Requested Prescriptions   Pending Prescriptions Disp Refills    ALPRAZolam (XANAX) 0.25 MG tablet [Pharmacy Med Name: ALPRAZOLAM 0.25 MG TABLET] 30 tablet      Sig: TAKE 1 TABLET (0.25 MG TOTAL) BY MOUTH 3 (THREE) TIMES DAILY AS NEEDED.       There is no refill protocol information for this order           Appointments  past 12m or future 3m with PCP    Date Provider   Last Visit   12/4/2019 Any Krueger MD   Next Visit   Visit date not found Any Krueger MD   ED visits in past 90 days: 0     Note composed:5:05 PM 06/04/2020

## 2020-07-06 ENCOUNTER — TELEPHONE (OUTPATIENT)
Dept: NEUROLOGY | Facility: CLINIC | Age: 50
End: 2020-07-06

## 2020-07-06 NOTE — TELEPHONE ENCOUNTER
Spoke to Patient and she states she has been having headaches and that she feels PT has helped her.She hasn't been seen since October and is requesting a virtual visit if needed.Informed Patient that if virtual is needed that clinic will inform her.She was advised that visits are to be follow per recommendations of Dr Granda to assess her Clinical status.She is out of visits to Physical Therapy.

## 2020-07-07 ENCOUNTER — TELEPHONE (OUTPATIENT)
Dept: NEUROLOGY | Facility: CLINIC | Age: 50
End: 2020-07-07

## 2020-07-20 NOTE — TELEPHONE ENCOUNTER
No new care gaps identified.  Powered by [employer]. Reference number: [id number]. 7/20/2020 9:12:41 AM CDT

## 2020-07-21 RX ORDER — MONTELUKAST SODIUM 10 MG/1
TABLET ORAL
Qty: 90 TABLET | Refills: 0 | Status: SHIPPED | OUTPATIENT
Start: 2020-07-21 | End: 2020-10-19

## 2020-07-21 NOTE — PROGRESS NOTES
Refill Routing Note   Medication(s) are not appropriate for processing by [facility]:       - Medication not active on medication list        Medication Therapy Plan: MEDICATION NOT ACTIVE ON MED LIST, DEFER TO YOU  Medication reconciliation completed: No      Automatic Epic Generated Protocol Data:        Requested Prescriptions   Pending Prescriptions Disp Refills    montelukast (SINGULAIR) 10 mg tablet [Pharmacy Med Name: MONTELUKAST SOD 10 MG TABLET] 90 tablet 0     Sig: TAKE 1 TABLET BY MOUTH EVERY DAY       Pulmonology:  Leukotriene Inhibitors Failed - 7/20/2020  9:12 AM        Failed - An appropriate indication is on the problem list     Allergic Rhinitis  Sinusitis  Seasonal Allergies   Asthma              Passed - Patient is at least 18 years old        Passed - Negative Pregnancy Status Check        Passed - Office visit in past 12 months or future 90 days.     Recent Outpatient Visits            5 months ago Visit for gynecologic examination    [facility] 5th Floor Lidia Islas MD    6 months ago Acute bacterial sinusitis    [facility] [place] Dolly Evans NP    7 months ago estrogen replacement therapy    [facility] Internal Medicine Any Krueger MD    8 months ago Eye exam, routine    [facility] Optometry Daniel Saavedra, SONAM    9 months ago Neck pain    [facility] - Neurology Epilepsy Sergio Granda MD          Future Appointments              Tomorrow Sergio Granda MD [facility] - Neurology Epilepsy, [facility]                      Appointments  past 12m or future 3m with PCP    Date Provider   Last Visit   12/4/2019 Any Krueger MD   Next Visit   Visit date not found Any Krueger MD   ED visits in past 90 days: 0     Note composed:12:08 PM 07/21/2020

## 2020-07-23 ENCOUNTER — TELEPHONE (OUTPATIENT)
Dept: NEUROLOGY | Facility: CLINIC | Age: 50
End: 2020-07-23

## 2020-07-23 NOTE — TELEPHONE ENCOUNTER
----- Message from Anastasiia Castillo sent at 7/23/2020 10:21 AM CDT -----  Contact: [phone]  Type:  Needs Medical Advice    Who Called: PT  Reason for Call: pt would like to  schedule a Virtual Visit with the Dr, pt had appt yesterday but was having trouble with the vinicio  Would the patient rather a call back or a response via [facility]? Call Back  Best Call Back Number: [phone]

## 2020-07-28 ENCOUNTER — OFFICE VISIT (OUTPATIENT)
Dept: NEUROLOGY | Facility: CLINIC | Age: 50
End: 2020-07-28
Payer: COMMERCIAL

## 2020-07-28 DIAGNOSIS — M54.2 CERVICALGIA: ICD-10-CM

## 2020-07-28 DIAGNOSIS — M54.2 NECK PAIN: Primary | ICD-10-CM

## 2020-07-28 PROCEDURE — 99214 OFFICE O/P EST MOD 30 MIN: CPT | Mod: 95,,, | Performed by: PSYCHIATRY & NEUROLOGY

## 2020-07-28 PROCEDURE — 99214 PR OFFICE/OUTPT VISIT, EST, LEVL IV, 30-39 MIN: ICD-10-PCS | Mod: 95,,, | Performed by: PSYCHIATRY & NEUROLOGY

## 2020-07-28 NOTE — PROGRESS NOTES
Follow up:   Base of skull pain at early AM hours - radiates to front + dizziness     PT with dry needling helps when she has clusters of headaches   Stress is a trigger   Still doing yoga, meditation   Using mouth guard     Also reports throat 'spasms'. No benefit from cingulair     Taking Imitrex nearly daily     3/4/20 e-mail: Hello! First, I want to report that I'm still making amazing progress in reducing and almost eliminating my migraines! Physical therapy and stress reduction have been keys. I was recently able to add everyday exercise back into my routine, with few headaches afterward. This is miraculous! I have gone through all of my prescribed PT sessions, and I don't feel I need to continue on a regular basis. However, in order to continue to be physically active, both with at-home PT exercises and regular fitness (running, walking, yoga), it would be helpful to have PT every 2-3 weeks for awhile, as my muscles continue to adjust to better posture and more effective functioning. Is it possible to prescribe additional PT at less frequency? Thank you so much!    Prior note:   Stress -> Neck pain -> HA + nausea   overactivation of trap      Prior note:   Oct 2014 - diag with Hemicran cont (L) - pain points on the L side + photo/phono  Indomethacin 25mg PRN - helped   Currently 4/week Hz      Headache history:   Age of onset -  Childhood   Location - R or L   Nature of pain -  Throbbing   Prodrome - no   Aura -  No   Duration of headache - > 4 hrs   Time to peak intensity - hrs   Pain scale - range of intensity -  8/10  Frequency -  2-3/month   Status Migrainosus history - yes   Time of day of most headaches- anytime      Associated symptoms with the headache:   Meningeal symptoms - photophobia, phonophobia, exercise intolerance +   Nausea/vomitting+   Nasal drainage   Visual blurriness   Pallor/flushing  Dizziness +   Vertigo  Confusion  Impaired concentration +   Pain worsened with physical  activity +   Neck pain   Cheek pain on R +      Cluster headache symptoms: none      Symptoms of increased intracranial pressure: none      Basilar migraine symptoms: none      Headache Triggers:   Stress +   Bright or flickering light  Strong smell  Vigorous exercise  Dietary factors   Visual strain   Weather changes +   Exertion  Heat (hot weather, hot baths or showers)  Menses     Other comorbid conditions:  Anxiety - yes   Motion sickness symptom - no      Treatment history:  Resolution of headache with sleep - yes   Meds tried:  H/o Renal stones   Fioricet, relpax, xanax   Flexeril   Medrol dose   maxalt - sluggish   Antiviral - not help   accupressure - helped   accupunture - helped   PT - helps   estradiol 0.05 mg/24 hr td ptsw (VIVELLE-DOT) 0.05 mg/24 hr  - twice weekly patch - helps with hot flashes   Topamax 25 mg - not help   Gabapentin 100 mg  - drowsy, not help    mobic - helped      Headache risk factors:   H/o TBI  - no   H/o Meningitis  - no   F/h Aneurysms  - no     Headache burden:   In the last three months:  Days missed from work = several   Days unable to perform activities of daily living = 0  Days unable to attend social activities = several       Review of Systems   Constitutional: Negative.    HENT: Negative.    Eyes: Negative.    Respiratory: Negative.    Cardiovascular: Negative.    Gastrointestinal: Negative.    Endocrine: Negative.    Genitourinary: Negative.    Musculoskeletal: neck pain.    Skin: Negative.    Allergic/Immunologic: Negative.    Neurological: Negative.    Hematological: Negative.    Psychiatric/Behavioral: anxiety.        Objective:   Physical Exam   Constitutional: She is oriented to person, place, and time. She appears well-developed and well-nourished.   Psychiatric: She has a normal mood and affect. Her behavior is normal. Judgment and thought content normal.       Assessment:       1. Intractable migraine without aura and without status migrainosus    2. Myofacial  pain at skull base (bel)   3. Tonsillar hernia into foramen magnum      Her HA are unlikely from a TAC and more likely from her tonsillar herniation and posture      MRI C spine  Impression       No focal disk abnormalities or significant central canal or neural foraminal stenosis.  ______________________________________     Electronically signed by resident: ELIUD LEVY MD  Date: 11/07/14  Time: 10:51      MRI brain  Impression        Approximate 3 mm of cerebellar tonsillar ectopia.    2-3 punctate foci of T2/flair signal hyperintensity frontal subcortical white matter which are nonspecific and of uncertain clinical significance.    Otherwise unremarkable MRI brain without evidence for acute infarction or enhancing lesion.    Electronically signed by: TRISH GOFF DO  Date: 10/24/14  Time: 14:27        Results for LAISHA HUMPHREY (MRN [id number]) as of 10/11/2019 09:44    Ref. Range 2/5/2019 10:19   Vitamin B-12 Latest Ref Range: 210 - 950 pg/mL >2000 (H)   Thiamine Latest Ref Range: 38 - 122 ug/L 45   Vit D, 25-Hydroxy Latest Ref Range: 30 - 96 ng/mL 75   Vitamin B2 Latest Ref Range: 1 - 19 mcg/L 18   Vitamin B6 Latest Ref Range: 5 - 50 ug/L 131 (H)   Antigliadin Ab IgG Latest Ref Range: <20 UNITS 2   Antigliadin Abs, IgA Latest Ref Range: <20 UNITS 7   TTG IgA Latest Ref Range: <20 UNITS 9   TTG IgG Latest Ref Range: <20 UNITS 3   Immunoglobulin A (IgA) Latest Ref Range: 70 - 400 mg/dL 172      Plan:   1, MRI C spine   2, cont PT   3, cont estrogen  4, Breakthrough headache - imitrex 100 mg, xanax, mobic    5, Consider EMGALITY if PT fails   See Neurosurgery for perineural root sleeve cysts, cerebellar tonsillar ectopia causing repetitive occipital headaches   F/up 2-3 m      Patient verbalized understanding to plan. I answered all her questions to her satisfaction    The patient location is: Home   The chief complaint leading to consultation is: Neck pain     Visit type: audiovisual    Face to Face time with  patient: 20 min   30 minutes of total time spent on the encounter, which includes face to face time and non-face to face time preparing to see the patient (eg, review of tests), Obtaining and/or reviewing separately obtained history, Documenting clinical information in the electronic or other health record, Independently interpreting results (not separately reported) and communicating results to the patient/family/caregiver, or Care coordination (not separately reported).     Each patient to whom he or she provides medical services by telemedicine is:  (1) informed of the relationship between the physician and patient and the respective role of any other health care provider with respect to management of the patient; and (2) notified that he or she may decline to receive medical services by telemedicine and may withdraw from such care at any time.

## 2020-08-07 NOTE — TELEPHONE ENCOUNTER
No new care gaps identified.  Powered by [employer]. Reference number: [id number]. 8/07/2020 1:08:39 AM MEGT

## 2020-08-10 RX ORDER — SUMATRIPTAN SUCCINATE 100 MG/1
TABLET ORAL
Qty: 27 TABLET | Refills: 0 | Status: SHIPPED | OUTPATIENT
Start: 2020-08-10 | End: 2020-12-18

## 2020-08-10 NOTE — PROGRESS NOTES
Refill Authorization Note    is requesting a refill authorization.    Brief assessment and rationale for refill: APPROVE: NA NON-INTENTIONAL     Medication-related problems identified: Non-adherence (knowledge deficit) non-intentional    Medication Therapy Plan: PER EPIC DATA 63% ADHERENCE; BP <150/100 PER ATTACMENT A; CDMR; APPROVE PER PROTOCOL    Medication reconciliation completed: No                         Comments:          Requested Prescriptions   Pending Prescriptions Disp Refills    sumatriptan (IMITREX) 100 MG tablet [Pharmacy Med Name: SUMATRIPTAN SUCC 100 MG TABLET] 27 tablet 0     Sig: TAKE 1 TABLET (100 MG TOTAL) BY MOUTH ONCE. FOR 1 DOSE       Neurology:  Migraine Therapy - Triptan Failed - 8/10/2020  8:21 AM        Failed - Last BP in normal range within 360 days.     BP Readings from Last 3 Encounters:   02/04/20 (!) 120/90   12/26/19 (!) 143/82   12/04/19 134/74              Passed - Patient is at least 18 years old        Passed - Negative Pregnancy Status Check        Passed - No contraindicated diagnoses on problem list     Chest Pain  Angina  Myocardial Infarction (MI)  Ischemic Heart Disease  Coronary Artery Disease              Passed - Office visit in past 12 months or future 90 days.     Recent Outpatient Visits            1 week ago Neck pain    [facility] - Neurology Epilepsy Sergio Granda MD    2 weeks ago     [facility] - Neurology Epilepsy Sergio Granda MD    6 months ago Visit for gynecologic examination    [facility] 5th Floor Lidia Islas MD    7 months ago Acute bacterial sinusitis    [facility] [place] Dolly Evans NP    8 months ago estrogen replacement therapy    [facility] Internal [facility] Any Krueger MD                        Appointments  past 12m or future 3m with PCP    Date Provider   Last Visit   12/4/2019 Any Krueger MD   Next Visit   Visit date not found Any Krueger MD   ED visits in past 90 days: 0      Note composed:8:37 AM 08/10/2020

## 2020-08-12 ENCOUNTER — TELEPHONE (OUTPATIENT)
Dept: NEUROSURGERY | Facility: CLINIC | Age: 50
End: 2020-08-12

## 2020-08-12 RX ORDER — DIAZEPAM 2 MG/1
TABLET ORAL
Qty: 3 TABLET | Refills: 0 | Status: SHIPPED | OUTPATIENT
Start: 2020-08-12 | End: 2020-08-20

## 2020-08-12 NOTE — TELEPHONE ENCOUNTER
----- Message from Rosalva Link MA sent at 8/10/2020  4:55 PM CDT -----  Regarding: FW: Referral    ----- Message -----  From: Sherin Ojeda MA  Sent: 8/7/2020   5:59 PM CDT  To: [facility] Neurosurgery Clinical Support  Subject: Referral                                         Good Evening,    Dr. Granda placed a referral for this patient, please contact her in regards to scheduling an appt.    Thanks,  Sherin

## 2020-08-20 ENCOUNTER — HOSPITAL ENCOUNTER (OUTPATIENT)
Dept: RADIOLOGY | Facility: HOSPITAL | Age: 50
Discharge: HOME | End: 2020-08-20
Attending: PSYCHIATRY & NEUROLOGY
Payer: COMMERCIAL

## 2020-08-20 ENCOUNTER — OFFICE VISIT (OUTPATIENT)
Dept: NEUROSURGERY | Facility: CLINIC | Age: 50
End: 2020-08-20
Payer: COMMERCIAL

## 2020-08-20 VITALS
HEART RATE: 89 BPM | BODY MASS INDEX: 28.34 KG/M2 | HEIGHT: 64 IN | TEMPERATURE: 98 F | DIASTOLIC BLOOD PRESSURE: 77 MMHG | WEIGHT: 166 LBS | SYSTOLIC BLOOD PRESSURE: 131 MMHG

## 2020-08-20 DIAGNOSIS — M54.2 NECK PAIN: Primary | ICD-10-CM

## 2020-08-20 DIAGNOSIS — Q04.8 CEREBELLAR TONSILLAR ECTOPIA: ICD-10-CM

## 2020-08-20 DIAGNOSIS — M54.2 CERVICALGIA: ICD-10-CM

## 2020-08-20 DIAGNOSIS — G43.019 INTRACTABLE MIGRAINE WITHOUT AURA AND WITHOUT STATUS MIGRAINOSUS: ICD-10-CM

## 2020-08-20 PROCEDURE — 3008F PR BODY MASS INDEX (BMI) DOCUMENTED: ICD-10-PCS | Mod: CPTII,S$GLB,, | Performed by: NURSE PRACTITIONER

## 2020-08-20 PROCEDURE — 3008F BODY MASS INDEX DOCD: CPT | Mod: CPTII,S$GLB,, | Performed by: NURSE PRACTITIONER

## 2020-08-20 PROCEDURE — 99999 PR PBB SHADOW E&M-EST. PATIENT-LVL IV: ICD-10-PCS | Mod: PBBFAC,,, | Performed by: NURSE PRACTITIONER

## 2020-08-20 PROCEDURE — 3075F PR MOST RECENT SYSTOLIC BLOOD PRESS GE 130-139MM HG: ICD-10-PCS | Mod: CPTII,S$GLB,, | Performed by: NURSE PRACTITIONER

## 2020-08-20 PROCEDURE — 3075F SYST BP GE 130 - 139MM HG: CPT | Mod: CPTII,S$GLB,, | Performed by: NURSE PRACTITIONER

## 2020-08-20 PROCEDURE — 99999 PR PBB SHADOW E&M-EST. PATIENT-LVL IV: CPT | Mod: PBBFAC,,, | Performed by: NURSE PRACTITIONER

## 2020-08-20 PROCEDURE — 72141 MRI CERVICAL SPINE WITHOUT CONTRAST: ICD-10-PCS | Mod: 26,,, | Performed by: RADIOLOGY

## 2020-08-20 PROCEDURE — 3078F PR MOST RECENT DIASTOLIC BLOOD PRESSURE < 80 MM HG: ICD-10-PCS | Mod: CPTII,S$GLB,, | Performed by: NURSE PRACTITIONER

## 2020-08-20 PROCEDURE — 3078F DIAST BP <80 MM HG: CPT | Mod: CPTII,S$GLB,, | Performed by: NURSE PRACTITIONER

## 2020-08-20 PROCEDURE — 99213 PR OFFICE/OUTPT VISIT, EST, LEVL III, 20-29 MIN: ICD-10-PCS | Mod: S$GLB,,, | Performed by: NURSE PRACTITIONER

## 2020-08-20 PROCEDURE — 72141 MRI NECK SPINE W/O DYE: CPT | Mod: 26,,, | Performed by: RADIOLOGY

## 2020-08-20 PROCEDURE — 99213 OFFICE O/P EST LOW 20 MIN: CPT | Mod: S$GLB,,, | Performed by: NURSE PRACTITIONER

## 2020-08-20 PROCEDURE — 72141 MRI NECK SPINE W/O DYE: CPT | Mod: TC

## 2020-08-20 RX ORDER — TALC
3 POWDER (GRAM) TOPICAL DAILY PRN
COMMUNITY
Start: 2020-01-20

## 2020-08-20 NOTE — LETTER
August 20, 2020      Sergio Granda MD  [address]  [facility] [address]           [facility] 7[facility] Fl  [address]  [facility] [address]  Phone: [phone]  Fax: [phone]          Patient: Cheryle Olivares   MR Number: [id number]   YOB: 1970   Date of Visit: 8/20/2020       Dear Dr. Sergio Granda:    Thank you for referring Cheryle Olivares to me for evaluation. Attached you will find relevant portions of my assessment and plan of care.    If you have questions, please do not hesitate to call me. I look forward to following Cheryle Olivares along with you.    Sincerely,    Tia Brooks, BRETT    Enclosure  CC:  No Recipients    If you would like to receive this communication electronically, please contact [email or web address] or [phone] to request more information on Your [facility] access.    For providers and/or their staff who would like to refer a patient to [facility], please contact us through our one-stop-shop provider referral line, [facility], at [phone].    If you feel you have received this communication in error or would no longer like to receive these types of communications, please e-mail [email or web address]

## 2020-08-20 NOTE — PROGRESS NOTES
Neurosurgery  History & Physical    SUBJECTIVE:     Chief Complaint: Headaches/ Neck Pain    History of Present Illness: Cheryle Olivares is a 50 y.o. female with PMH of migraines. She is being seen in clinic today with her  for evaluation of neck pain and headaches. States that she had a MRI of her brain in the past, which showed slight cerebellar tonsillar ectopia. Dr. Benitez wanted to re-evaluate the ectopia and neck pain to rule out as a cause of her migraines. Describes the pain as headaches as constant starting about the base of the skull R > L and radiating upward towards the front of the skull. Rates the neck pain as 5/10. Aggravating factors include stress and certain neck positions. Alleviating factors include PT, traction, and accupressure. Denies weakness, numbness, b/b dysfunction, saddle anesthesia, or gait instability. She is requesting a referral to PT because she obtains such relief of the headaches and neck stiffness.      Review of patient's allergies indicates:  No Known Allergies    Current Outpatient Medications   Medication Sig Dispense Refill    ALPRAZolam (XANAX) 0.25 MG tablet TAKE 1 TABLET (0.25 MG TOTAL) BY MOUTH 3 (THREE) TIMES DAILY AS NEEDED. 30 tablet 3    cetirizine (ZYRTEC) 10 MG tablet Take 10 mg by mouth once daily.        estradiol 0.05 mg/24 hr td ptsw (VIVELLE-DOT) 0.05 mg/24 hr APPLY 1 PATCH TWICE A WEEK 24 patch 3    fluticasone propionate (FLONASE ALLERGY RELIEF NASL)       MAGNESIUM OXIDE ORAL Take 1 tablet by mouth.      melatonin (MELATIN) 3 mg tablet       montelukast (SINGULAIR) 10 mg tablet TAKE 1 TABLET BY MOUTH EVERY DAY 90 tablet 0    multivitamin capsule Take 1 capsule by mouth once daily.      sumatriptan (IMITREX) 100 MG tablet TAKE 1 TABLET (100 MG TOTAL) BY MOUTH ONCE. FOR 1 DOSE 27 tablet 0    diazePAM (VALIUM) 2 MG tablet Take 1 tablet 30 min prior to the MRI for sedation.  May take an additional tablet prior to MRI if needed (Patient not  taking: Reported on 8/20/2020) 3 tablet 0    meloxicam (MOBIC) 15 MG tablet One by mouth daily if needed for musculoskeletal pain (Patient not taking: Reported on 8/20/2020) 30 tablet 5     No current facility-administered medications for this visit.        Past Medical History:   Diagnosis Date    Allergy     Anemia     History of migraine headaches     Migraine headache     Morbid obesity     Preeclampsia, severe 7/15/2013     Past Surgical History:   Procedure Laterality Date    HYSTERECTOMY      TVH WITHOUT OOPHORECTOMY BLEEDING 12/2015 EK    left knee surgery       Family History     Problem Relation (Age of Onset)    Cataracts Maternal Grandfather    Colon cancer Maternal Uncle    Coronary artery disease Paternal Grandfather    Heart attack Maternal Grandfather    Heart disease Maternal Grandfather    Hypertension Mother, Maternal Grandmother    Macular degeneration Mother, Maternal Grandfather    Melanoma Father, Brother    Miscarriages / Stillbirths Sister    No Known Problems Maternal Aunt, Paternal Aunt, Paternal Uncle, Paternal Grandmother    Retinal detachment Mother    Skin cancer Father, Brother        Social History     Socioeconomic History    Marital status:      Spouse name: Not on file    Number of children: Not on file    Years of education: Not on file    Highest education level: Not on file   Occupational History    Occupation: director     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   Social Needs    Financial resource strain: Not on file    Food insecurity     Worry: Not on file     Inability: Not on file    Transportation needs     Medical: Not on file     Non-medical: Not on file   Tobacco Use    Smoking status: Never Smoker    Smokeless tobacco: Never Used   Substance and Sexual Activity    Alcohol use: No    Drug use: No    Sexual activity: Yes     Partners: Male     Birth control/protection: None   Lifestyle    Physical activity     Days per week: Not on file      Minutes per session: Not on file    Stress: Not on file   Relationships    Social connections     Talks on phone: Not on file     Gets together: Not on file     Attends Cheondoism service: Not on file     Active member of club or organization: Not on file     Attends meetings of clubs or organizations: Not on file     Relationship status: Not on file   Other Topics Concern    Are you pregnant or think you may be? 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  She is self-employed.         Her  is the  of a [employer] school on the [employer] which has 1000 students ages K. Through 8.  She  in 2010.  They have 2 daughters.    She had a previous marriage which ended in divorce       Review of Systems   Constitutional: Negative for activity change, appetite change and fever.   HENT: Negative for ear discharge.    Eyes: Negative for pain and visual disturbance.   Respiratory: Positive for shortness of breath (anxiety ). Negative for cough and chest tightness.    Cardiovascular: Negative for chest pain and palpitations.   Gastrointestinal: Negative for abdominal distention and abdominal pain.   Endocrine: Negative for polydipsia, polyphagia and polyuria.   Genitourinary: Negative for difficulty urinating, frequency and urgency.   Musculoskeletal: Positive for back pain, myalgias, neck pain and neck stiffness. Negative for arthralgias.   Skin: Negative for color change and wound.   Neurological: Positive for dizziness and headaches. Negative for weakness and numbness.   Hematological: Does not bruise/bleed easily.   Psychiatric/Behavioral: Negative for behavioral problems, confusion and dysphoric mood. The patient is nervous/anxious.        OBJECTIVE:     Vital Signs  Temp: 97.5 °F (36.4 °C)  Pulse: 89  BP: 131/77  Pain Score:    "5  Height: 5' 4" (162.6 cm)  Weight: 75.3 kg (166 lb)  Body mass index is 28.49 kg/m².      Neurosurgery Physical Exam  General: well developed, well nourished, no distress.   Head: normocephalic, atraumatic  Neurologic: Alert and oriented. Thought content appropriate.  GCS: Motor: 6/Verbal: 5/Eyes: 4 GCS Total: 15  Mental Status: Awake, Alert, Oriented x 4  Language: No aphasia  Speech: No dysarthria  Cranial nerves: face symmetric, tongue midline, CN II-XII grossly intact.   Eyes: pupils equal, round, reactive to light with accomodation, EOMI.   Pulmonary: normal respirations, no signs of respiratory distress  Abdomen: soft, non-distended  Skin: Skin is warm, dry and intact.  Sensory: intact to light touch throughout  Motor Strength:Moves all extremities spontaneously with good tone. No abnormal movements seen.     Strength  Deltoids Triceps Biceps Wrist Extension Wrist Flexion Hand    Upper: R 5/5 5/5 5/5 5/5 5/5 5/5    L 5/5 5/5 5/5 5/5 5/5 5/5     Iliopsoas Quadriceps Knee  Flexion Tibialis  anterior Gastro- cnemius EHL   Lower: R 5/5 5/5 5/5 5/5 5/5 5/5    L 5/5 5/5 5/5 5/5 5/5 5/5     Reflexes:   DTR: 2+ symmetrically throughout.  Castañeda's: Negative.  Babinski's: Negative.  Clonus: Negative.     Cerebellar:   Finger-to-nose: intact bilaterally   Pronator drift: absent bilaterally  Gait stable, fluid.   Tandem Gait: No difficulty  Able to walk on heels & toes     Cervical:   ROM: Full with flexion, extension, lateral rotation and ear-to-shoulder bend.   Midline TTP: Negative.  Lhermitte's: Negative.     Thoracic:  Midline TTP: Negative.     Lumbar:  Midline TTP: Negative.    Diagnostic Results:  I have personally reviewed the MRI cervical spine dated 8/20/20 with Dr. Baird. The imaging shows multilevel degenerative changes. No evidence of significant central stenosis. Perineural cysts noted at C7-T1. Cerebellar tonsillar ectopia appears unchanged.     ASSESSMENT/PLAN:   Cheryle Olivares is a 50 y.o. " female with PMH of migraines. She was seen in clinic today with her  for evaluation of neck pain and headaches. The MRI c-spine showed degenerative changes but without evidence of significant central stenosis. After reviewing the imaging and case with Dr. Baird, no neurosurgical intervention is indicated at this time. The cerebellar tonsillar ectopia appears stable when compared to prior imaging. I have re-ordered the PT as requested by the patient as she stated that she receives significant relief. We discussed following back up with Dr. Benitez regarding additional interventions for the migraines. She was agreeable to the plan and verbalized understanding.     I would like the patient to follow-up in clinic as needed. I have encouraged her to contact the clinic with any questions, concerns, or adverse clinical changes. She verbalized understanding.     NIK Donato-ROBBIE  Neurosurgery  [facility].     Note dictated with voice recognition software, please excuse any grammatical errors.

## 2020-10-18 DIAGNOSIS — Z00.00 ANNUAL PHYSICAL EXAM: Primary | ICD-10-CM

## 2020-10-18 NOTE — TELEPHONE ENCOUNTER
No new care gaps identified.  Powered by [employer]. Reference number: [id number]. 10/18/2020 12:42:31 AM   MEGT

## 2020-10-19 RX ORDER — MONTELUKAST SODIUM 10 MG/1
10 TABLET ORAL DAILY
Qty: 90 TABLET | Refills: 3 | Status: SHIPPED | OUTPATIENT
Start: 2020-10-19 | End: 2021-11-03

## 2020-10-19 RX ORDER — MONTELUKAST SODIUM 10 MG/1
TABLET ORAL
Qty: 90 TABLET | Refills: 0 | Status: SHIPPED | OUTPATIENT
Start: 2020-10-19 | End: 2020-10-19 | Stop reason: SDUPTHER

## 2020-10-19 NOTE — PROGRESS NOTES
Refill Authorization Note   Cheryle Olivares is requesting a refill authorization.  Brief assessment and rationale for refill: Approve    -Medication-related problems identified: Requires appointment     Medication Therapy Plan: appt(annual)    Medication reconciliation completed: No   Comments:   Orders Placed This Encounter    montelukast (SINGULAIR) 10 mg tablet      Requested Prescriptions   Signed Prescriptions Disp Refills    montelukast (SINGULAIR) 10 mg tablet 90 tablet 0     Sig: TAKE 1 TABLET BY MOUTH EVERY DAY       Pulmonology:  Leukotriene Inhibitors Failed - 10/18/2020 12:42 AM        Failed - An appropriate indication is on the problem list     Allergic Rhinitis  Sinusitis  Seasonal Allergies   Asthma              Passed - Patient is at least 18 years old        Passed - Negative Pregnancy Status Check        Passed - Office Visit within last 12 months or future 90 days.     Recent Outpatient Visits            2 months ago Neck pain    [facility] - Neurosurgery 7th Fl Tia Brooks NP    2 months ago Neck pain    [facility] - Neurology 7th Fl Sergio Granda MD    2 months ago     [facility] Neurology 7th Fl Sergio Granda MD    8 months ago Visit for gynecologic examination    [facility] - OB/GYN 5th Fl Lidia Islas MD    9 months ago Acute bacterial sinusitis    [facility] Dolly Evans NP                        Appointments  past 12m or future 3m with PCP    Date Provider   Last Visit   12/4/2019 Any Krueger MD   Next Visit   Visit date not found Any Krueger MD   ED visits in past 90 days: 0     Note composed:11:36 AM 10/19/2020

## 2020-10-19 NOTE — TELEPHONE ENCOUNTER
Pt is scheduled for an annual appt on 11/10 at 10:30 Am.  Place lab orders prior to visit or she can do it after visit.    Veronica

## 2020-10-19 NOTE — TELEPHONE ENCOUNTER
Provider Staff:     Action is required for this patient.     Please schedule patient for Annual    Thanks!  [facility]     Appointments  past 12m or future 3m with PCP    Date Provider   Last Visit   12/4/2019 Any Krueger MD   Next Visit   Visit date not found Any Krueger MD     Note composed:11:37 AM 10/19/2020

## 2020-10-22 ENCOUNTER — IMMUNIZATION (OUTPATIENT)
Dept: INTERNAL MEDICINE | Facility: CLINIC | Age: 50
End: 2020-10-22
Payer: COMMERCIAL

## 2020-10-22 PROCEDURE — 90686 FLU VACCINE (QUAD) GREATER THAN OR EQUAL TO 3YO PRESERVATIVE FREE IM: ICD-10-PCS | Mod: S$GLB,,, | Performed by: INTERNAL MEDICINE

## 2020-10-22 PROCEDURE — 90471 IMMUNIZATION ADMIN: CPT | Mod: S$GLB,,, | Performed by: INTERNAL MEDICINE

## 2020-10-22 PROCEDURE — 90471 FLU VACCINE (QUAD) GREATER THAN OR EQUAL TO 3YO PRESERVATIVE FREE IM: ICD-10-PCS | Mod: S$GLB,,, | Performed by: INTERNAL MEDICINE

## 2020-10-22 PROCEDURE — 90686 IIV4 VACC NO PRSV 0.5 ML IM: CPT | Mod: S$GLB,,, | Performed by: INTERNAL MEDICINE

## 2020-11-09 ENCOUNTER — LAB VISIT (OUTPATIENT)
Dept: LAB | Facility: HOSPITAL | Age: 50
End: 2020-11-09
Attending: INTERNAL MEDICINE
Payer: COMMERCIAL

## 2020-11-09 DIAGNOSIS — Z00.00 ANNUAL PHYSICAL EXAM: ICD-10-CM

## 2020-11-09 LAB
ALBUMIN SERPL BCP-MCNC: 4 G/DL (ref 3.5–5.2)
ALP SERPL-CCNC: 73 U/L (ref 55–135)
ALT SERPL W/O P-5'-P-CCNC: 19 U/L (ref 10–44)
ANION GAP SERPL CALC-SCNC: 8 MMOL/L (ref 8–16)
AST SERPL-CCNC: 25 U/L (ref 10–40)
BASOPHILS # BLD AUTO: 0.03 K/UL (ref 0–0.2)
BASOPHILS NFR BLD: 0.6 % (ref 0–1.9)
BILIRUB SERPL-MCNC: 0.8 MG/DL (ref 0.1–1)
BUN SERPL-MCNC: 13 MG/DL (ref 6–20)
CALCIUM SERPL-MCNC: 8.9 MG/DL (ref 8.7–10.5)
CHLORIDE SERPL-SCNC: 103 MMOL/L (ref 95–110)
CHOLEST SERPL-MCNC: 240 MG/DL (ref 120–199)
CHOLEST/HDLC SERPL: 2.6 {RATIO} (ref 2–5)
CO2 SERPL-SCNC: 29 MMOL/L (ref 23–29)
CREAT SERPL-MCNC: 0.7 MG/DL (ref 0.5–1.4)
DIFFERENTIAL METHOD: ABNORMAL
EOSINOPHIL # BLD AUTO: 0.1 K/UL (ref 0–0.5)
EOSINOPHIL NFR BLD: 1.1 % (ref 0–8)
ERYTHROCYTE [DISTWIDTH] IN BLOOD BY AUTOMATED COUNT: 12.2 % (ref 11.5–14.5)
EST. GFR  (AFRICAN AMERICAN): >60 ML/MIN/1.73 M^2
EST. GFR  (NON AFRICAN AMERICAN): >60 ML/MIN/1.73 M^2
GLUCOSE SERPL-MCNC: 90 MG/DL (ref 70–110)
HCT VFR BLD AUTO: 41.9 % (ref 37–48.5)
HDLC SERPL-MCNC: 94 MG/DL (ref 40–75)
HDLC SERPL: 39.2 % (ref 20–50)
HGB BLD-MCNC: 13.9 G/DL (ref 12–16)
IMM GRANULOCYTES # BLD AUTO: 0.01 K/UL (ref 0–0.04)
IMM GRANULOCYTES NFR BLD AUTO: 0.2 % (ref 0–0.5)
LDLC SERPL CALC-MCNC: 138 MG/DL (ref 63–159)
LYMPHOCYTES # BLD AUTO: 2.2 K/UL (ref 1–4.8)
LYMPHOCYTES NFR BLD: 40.3 % (ref 18–48)
MCH RBC QN AUTO: 31.4 PG (ref 27–31)
MCHC RBC AUTO-ENTMCNC: 33.2 G/DL (ref 32–36)
MCV RBC AUTO: 95 FL (ref 82–98)
MONOCYTES # BLD AUTO: 0.4 K/UL (ref 0.3–1)
MONOCYTES NFR BLD: 7.6 % (ref 4–15)
NEUTROPHILS # BLD AUTO: 2.7 K/UL (ref 1.8–7.7)
NEUTROPHILS NFR BLD: 50.2 % (ref 38–73)
NONHDLC SERPL-MCNC: 146 MG/DL
NRBC BLD-RTO: 0 /100 WBC
PLATELET # BLD AUTO: 176 K/UL (ref 150–350)
PMV BLD AUTO: 10.7 FL (ref 9.2–12.9)
POTASSIUM SERPL-SCNC: 3.5 MMOL/L (ref 3.5–5.1)
PROT SERPL-MCNC: 6.8 G/DL (ref 6–8.4)
RBC # BLD AUTO: 4.42 M/UL (ref 4–5.4)
SODIUM SERPL-SCNC: 140 MMOL/L (ref 136–145)
TRIGL SERPL-MCNC: 40 MG/DL (ref 30–150)
TSH SERPL DL<=0.005 MIU/L-ACNC: 2.58 UIU/ML (ref 0.4–4)
WBC # BLD AUTO: 5.39 K/UL (ref 3.9–12.7)

## 2020-11-09 PROCEDURE — 80061 LIPID PANEL: CPT

## 2020-11-09 PROCEDURE — 85025 COMPLETE CBC W/AUTO DIFF WBC: CPT

## 2020-11-09 PROCEDURE — 84443 ASSAY THYROID STIM HORMONE: CPT

## 2020-11-09 PROCEDURE — 36415 COLL VENOUS BLD VENIPUNCTURE: CPT

## 2020-11-09 PROCEDURE — 80053 COMPREHEN METABOLIC PANEL: CPT

## 2020-11-10 ENCOUNTER — OFFICE VISIT (OUTPATIENT)
Dept: INTERNAL MEDICINE | Facility: CLINIC | Age: 50
End: 2020-11-10
Payer: COMMERCIAL

## 2020-11-10 VITALS
BODY MASS INDEX: 29.06 KG/M2 | OXYGEN SATURATION: 97 % | HEIGHT: 64 IN | HEART RATE: 66 BPM | SYSTOLIC BLOOD PRESSURE: 118 MMHG | WEIGHT: 170.19 LBS | DIASTOLIC BLOOD PRESSURE: 60 MMHG

## 2020-11-10 DIAGNOSIS — R00.2 PALPITATIONS: ICD-10-CM

## 2020-11-10 DIAGNOSIS — M54.2 NECK PAIN: ICD-10-CM

## 2020-11-10 DIAGNOSIS — Z71.89 COUNSELING FOR ESTROGEN REPLACEMENT THERAPY: ICD-10-CM

## 2020-11-10 DIAGNOSIS — Z00.00 ANNUAL PHYSICAL EXAM: Primary | ICD-10-CM

## 2020-11-10 DIAGNOSIS — R79.89 HIGH SERUM LOW-DENSITY LIPOPROTEIN (LDL): ICD-10-CM

## 2020-11-10 DIAGNOSIS — Z12.11 SCREEN FOR COLON CANCER: ICD-10-CM

## 2020-11-10 PROCEDURE — 99396 PR PREVENTIVE VISIT,EST,40-64: ICD-10-PCS | Mod: S$GLB,,, | Performed by: INTERNAL MEDICINE

## 2020-11-10 PROCEDURE — 99396 PREV VISIT EST AGE 40-64: CPT | Mod: S$GLB,,, | Performed by: INTERNAL MEDICINE

## 2020-11-10 PROCEDURE — 3008F BODY MASS INDEX DOCD: CPT | Mod: CPTII,S$GLB,, | Performed by: INTERNAL MEDICINE

## 2020-11-10 PROCEDURE — 3008F PR BODY MASS INDEX (BMI) DOCUMENTED: ICD-10-PCS | Mod: CPTII,S$GLB,, | Performed by: INTERNAL MEDICINE

## 2020-11-10 PROCEDURE — 3074F SYST BP LT 130 MM HG: CPT | Mod: CPTII,S$GLB,, | Performed by: INTERNAL MEDICINE

## 2020-11-10 PROCEDURE — 3078F PR MOST RECENT DIASTOLIC BLOOD PRESSURE < 80 MM HG: ICD-10-PCS | Mod: CPTII,S$GLB,, | Performed by: INTERNAL MEDICINE

## 2020-11-10 PROCEDURE — 99999 PR PBB SHADOW E&M-EST. PATIENT-LVL IV: ICD-10-PCS | Mod: PBBFAC,,, | Performed by: INTERNAL MEDICINE

## 2020-11-10 PROCEDURE — 99999 PR PBB SHADOW E&M-EST. PATIENT-LVL IV: CPT | Mod: PBBFAC,,, | Performed by: INTERNAL MEDICINE

## 2020-11-10 PROCEDURE — 3074F PR MOST RECENT SYSTOLIC BLOOD PRESSURE < 130 MM HG: ICD-10-PCS | Mod: CPTII,S$GLB,, | Performed by: INTERNAL MEDICINE

## 2020-11-10 PROCEDURE — 3078F DIAST BP <80 MM HG: CPT | Mod: CPTII,S$GLB,, | Performed by: INTERNAL MEDICINE

## 2020-11-10 RX ORDER — ESTRADIOL 0.07 MG/D
1 FILM, EXTENDED RELEASE TRANSDERMAL
Qty: 24 PATCH | Refills: 3 | Status: SHIPPED | OUTPATIENT
Start: 2020-11-12 | End: 2021-04-19

## 2020-11-10 NOTE — PATIENT INSTRUCTIONS
The 10-year CVD risk score (Belen et al., 2008) is: 2.9%    Values used to calculate the score:      Age: 50 years      Sex: Female      Diabetic: No      Tobacco smoker: No      Systolic Blood Pressure: 118 mmHg      Is BP treated: No      HDL Cholesterol: 94 mg/dL      Total Cholesterol: 240 mg/dL  Results for orders placed or performed in visit on 11/09/20   CBC auto differential   Result Value Ref Range    WBC 5.39 3.90 - 12.70 K/uL    RBC 4.42 4.00 - 5.40 M/uL    Hemoglobin 13.9 12.0 - 16.0 g/dL    Hematocrit 41.9 37.0 - 48.5 %    MCV 95 82 - 98 fL    MCH 31.4 (H) 27.0 - 31.0 pg    MCHC 33.2 32.0 - 36.0 g/dL    RDW 12.2 11.5 - 14.5 %    Platelets 176 150 - 350 K/uL    MPV 10.7 9.2 - 12.9 fL    Immature Granulocytes 0.2 0.0 - 0.5 %    Gran # (ANC) 2.7 1.8 - 7.7 K/uL    Immature Grans (Abs) 0.01 0.00 - 0.04 K/uL    Lymph # 2.2 1.0 - 4.8 K/uL    Mono # 0.4 0.3 - 1.0 K/uL    Eos # 0.1 0.0 - 0.5 K/uL    Baso # 0.03 0.00 - 0.20 K/uL    nRBC 0 0 /100 WBC    Gran % 50.2 38.0 - 73.0 %    Lymph % 40.3 18.0 - 48.0 %    Mono % 7.6 4.0 - 15.0 %    Eosinophil % 1.1 0.0 - 8.0 %    Basophil % 0.6 0.0 - 1.9 %    Differential Method Automated    Comprehensive Metabolic Panel   Result Value Ref Range    Sodium 140 136 - 145 mmol/L    Potassium 3.5 3.5 - 5.1 mmol/L    Chloride 103 95 - 110 mmol/L    CO2 29 23 - 29 mmol/L    Glucose 90 70 - 110 mg/dL    BUN 13 6 - 20 mg/dL    Creatinine 0.7 0.5 - 1.4 mg/dL    Calcium 8.9 8.7 - 10.5 mg/dL    Total Protein 6.8 6.0 - 8.4 g/dL    Albumin 4.0 3.5 - 5.2 g/dL    Total Bilirubin 0.8 0.1 - 1.0 mg/dL    Alkaline Phosphatase 73 55 - 135 U/L    AST 25 10 - 40 U/L    ALT 19 10 - 44 U/L    Anion Gap 8 8 - 16 mmol/L    eGFR if African American >60.0 >60 mL/min/1.73 m^2    eGFR if non African American >60.0 >60 mL/min/1.73 m^2   Lipid Panel   Result Value Ref Range    Cholesterol 240 (H) 120 - 199 mg/dL    Triglycerides 40 30 - 150 mg/dL    HDL 94 (H) 40 - 75 mg/dL    LDL Cholesterol 138.0  63.0 - 159.0 mg/dL    HDL/Cholesterol Ratio 39.2 20.0 - 50.0 %    Total Cholesterol/HDL Ratio 2.6 2.0 - 5.0    Non-HDL Cholesterol 146 mg/dL   TSH   Result Value Ref Range    TSH 2.582 0.400 - 4.000 uIU/mL     Try to consume 5-7 servings of fruit every day.  1/4 cup of any dried fruit is 1 serving of fruit.  This could include raisins, prunes, apricots, banana chips, tart dried cheeries without added sugar.  Always read labels to avoid added sugar or other additives.  1/2 cup of any 100% juice is 1 serving, such as orange juice, grape juice etc.  One standard sized banana is 2 servings of fruit.  Even though bananas are not very sweet tasting, they pack a high glycemic index.  Half of a standard size apple is also 2 servings of fruit.  One cup of strawberries or any other berry is 1 serving of fruit.  One cup of any melon is 1 serving of fruit.

## 2020-11-10 NOTE — PROGRESS NOTES
Subjective:       Patient ID: Cheryle Olivares is a 50 y.o. female.    Chief Complaint: Annual Exam    This dictation was performed using using MModal.   She presents for routine annual physical  Things are going well  She is working from home  Her marriage is happy  Her  has a stressful job  Their daughters are 7 and 9, attending [employer], in the classroom    She is going to get back into physical therapy for her neck pain which helps reduce the migraine headaches    She is still having night sweats 2 to 3 times a week she is on a low-dose estrogen replacement status post hysterectomy    Her labs are discussed and her lipid panel explored  The 10-year CVD risk score (LISANDRA'Agostino, et al., 2008) is: 2.9%    Values used to calculate the score:      Age: 50 years      Sex: Female      Diabetic: No      Tobacco smoker: No      Systolic Blood Pressure: 118 mmHg      Is BP treated: No      HDL Cholesterol: 94 mg/dL      Total Cholesterol: 240 mg/dL   has a past medical history of Allergy, Anemia, History of migraine headaches, Migraine headache, Morbid obesity, and Preeclampsia, severe (7/15/2013).  Past Surgical History:   Procedure Laterality Date    HYSTERECTOMY      TVH WITHOUT OOPHORECTOMY BLEEDING 12/2015 EK    left knee surgery         HPI  Review of Systems  Review of systems is negative unless noted.  Objective:      Physical Exam  Vitals signs reviewed.   HENT:      Head: Atraumatic.   Eyes:      General: No scleral icterus.     Conjunctiva/sclera: Conjunctivae normal.   Neck:      Musculoskeletal: Neck supple.   Cardiovascular:      Rate and Rhythm: Normal rate and regular rhythm.   Pulmonary:      Effort: Pulmonary effort is normal.      Breath sounds: Normal breath sounds.   Abdominal:      Palpations: Abdomen is soft.      Tenderness: There is no abdominal tenderness.   Lymphadenopathy:      Cervical: No cervical adenopathy.   Skin:     General: Skin is warm and dry.   Neurological:       Mental Status: She is alert.   Psychiatric:         Behavior: Behavior normal.         Assessment:       1. Annual physical exam    2. Neck pain    3. estrogen replacement therapy    4. Palpitations    5. High serum low-density lipoprotein (LDL)    6. Screen for colon cancer        Plan:   Cheryle was seen today for annual exam.    Diagnoses and all orders for this visit:    Annual physical exam    Neck pain, managing    estrogen replacement therapy, recommend going up from 0.05 to 0.075mg/24 hrs    Palpitations, not so much of a problem    High serum low-density lipoprotein (LDL)    Screen for colon cancer  -     Case request GI: COLONOSCOPY    Other orders  -     estradioL (VIVELLE-DOT) 0.075 mg/24 hr; Place 1 patch onto the skin twice a week.    Follow up in about 1 year (around 11/10/2021), or for physical.

## 2020-12-17 DIAGNOSIS — G43.019 INTRACTABLE MIGRAINE WITHOUT AURA AND WITHOUT STATUS MIGRAINOSUS: Primary | ICD-10-CM

## 2020-12-17 NOTE — TELEPHONE ENCOUNTER
No new care gaps identified.  Powered by [employer]. Reference number: [id number]. 12/17/2020 3:58:54 AM   CST

## 2020-12-18 RX ORDER — SUMATRIPTAN SUCCINATE 100 MG/1
TABLET ORAL
Qty: 27 TABLET | Refills: 3 | Status: SHIPPED | OUTPATIENT
Start: 2020-12-18 | End: 2021-11-24

## 2020-12-18 NOTE — PROGRESS NOTES
Refill Authorization Note   Cheryle Olivares  is requesting a refill authorization.  Brief Assessment and Rationale for Refill:  Approve     Medication Therapy Plan:  [facility]    Medication Reconciliation Completed: No   Comments:   Orders Placed This Encounter    sumatriptan (IMITREX) 100 MG tablet      Requested Prescriptions   Signed Prescriptions Disp Refills    sumatriptan (IMITREX) 100 MG tablet 27 tablet 3     Sig: TAKE 1 TABLET BY MOUTH ONCE. ( INSURANCE ONLY COVERS 9TABS FOR 23 DAY SUPPLY)       Neurology:  Migraine Therapy - Triptan Passed - 12/17/2020  3:58 AM        Passed - Patient is at least 18 years old        Passed - Negative Pregnancy Status Check        Passed - No contraindicated diagnoses on problem list     Chest Pain  Angina  Myocardial Infarction (MI)  Ischemic Heart Disease  Coronary Artery Disease              Passed - Last BP in normal range within 360 days.     BP Readings from Last 3 Encounters:   11/10/20 118/60   08/20/20 131/77   02/04/20 (!) 120/90              Passed - Office visit in past 12 months or future 90 days     Recent Outpatient Visits            1 month ago Annual physical exam    [facility] B[facility] Any Krueger MD    4 months ago Neck pain    [facility] - Neurosurgery 7th Fl Tia Brooks NP    4 months ago Neck pain    [facility] Neurology 7th Fl Sergio Granda MD    4 months ago     [facility] 7th Fl Sergio Granda MD    10 months ago Visit for gynecologic examination    [facility] - OB/GYN 5th Fl Lidia Islas MD                        Appointments  past 12m or future 3m with PCP    Date Provider   Last Visit   11/10/2020 Any Krueger MD   Next Visit   Visit date not found Any Krueger MD   ED visits in past 90 days: 0     Note composed:11:35 AM 12/18/2020

## 2021-02-11 ENCOUNTER — OFFICE VISIT (OUTPATIENT)
Dept: URGENT CARE | Facility: CLINIC | Age: 51
End: 2021-02-11
Payer: COMMERCIAL

## 2021-02-11 VITALS
HEART RATE: 82 BPM | SYSTOLIC BLOOD PRESSURE: 144 MMHG | TEMPERATURE: 98 F | HEIGHT: 64 IN | DIASTOLIC BLOOD PRESSURE: 88 MMHG | WEIGHT: 170 LBS | RESPIRATION RATE: 15 BRPM | BODY MASS INDEX: 29.02 KG/M2 | OXYGEN SATURATION: 98 %

## 2021-02-11 DIAGNOSIS — J06.9 UPPER RESPIRATORY TRACT INFECTION, UNSPECIFIED TYPE: Primary | ICD-10-CM

## 2021-02-11 LAB
CTP QC/QA: YES
SARS-COV-2 RDRP RESP QL NAA+PROBE: NEGATIVE

## 2021-02-11 PROCEDURE — 99214 OFFICE O/P EST MOD 30 MIN: CPT | Mod: S$GLB,,, | Performed by: FAMILY MEDICINE

## 2021-02-11 PROCEDURE — 3008F BODY MASS INDEX DOCD: CPT | Mod: CPTII,S$GLB,, | Performed by: FAMILY MEDICINE

## 2021-02-11 PROCEDURE — 99214 PR OFFICE/OUTPT VISIT, EST, LEVL IV, 30-39 MIN: ICD-10-PCS | Mod: S$GLB,,, | Performed by: FAMILY MEDICINE

## 2021-02-11 PROCEDURE — 3008F PR BODY MASS INDEX (BMI) DOCUMENTED: ICD-10-PCS | Mod: CPTII,S$GLB,, | Performed by: FAMILY MEDICINE

## 2021-02-11 RX ORDER — BENZONATATE 100 MG/1
100 CAPSULE ORAL EVERY 6 HOURS PRN
Qty: 30 CAPSULE | Refills: 1 | Status: SHIPPED | OUTPATIENT
Start: 2021-02-11 | End: 2021-04-19

## 2021-02-17 RX ORDER — ESTRADIOL 0.05 MG/D
FILM, EXTENDED RELEASE TRANSDERMAL
Qty: 24 PATCH | Refills: 2 | Status: SHIPPED | OUTPATIENT
Start: 2021-02-17 | End: 2021-02-26 | Stop reason: SDUPTHER

## 2021-02-24 ENCOUNTER — PATIENT MESSAGE (OUTPATIENT)
Dept: NEUROLOGY | Facility: CLINIC | Age: 51
End: 2021-02-24

## 2021-02-24 ENCOUNTER — PATIENT OUTREACH (OUTPATIENT)
Dept: ADMINISTRATIVE | Facility: OTHER | Age: 51
End: 2021-02-24

## 2021-02-24 DIAGNOSIS — Z12.31 OTHER SCREENING MAMMOGRAM: ICD-10-CM

## 2021-02-24 DIAGNOSIS — R39.15 URINARY URGENCY: Primary | ICD-10-CM

## 2021-02-26 ENCOUNTER — OFFICE VISIT (OUTPATIENT)
Dept: OBSTETRICS AND GYNECOLOGY | Facility: CLINIC | Age: 51
End: 2021-02-26
Payer: COMMERCIAL

## 2021-02-26 VITALS
BODY MASS INDEX: 29.9 KG/M2 | HEIGHT: 64 IN | WEIGHT: 175.13 LBS | DIASTOLIC BLOOD PRESSURE: 76 MMHG | SYSTOLIC BLOOD PRESSURE: 124 MMHG

## 2021-02-26 DIAGNOSIS — Z79.890 HORMONE REPLACEMENT THERAPY (HRT): ICD-10-CM

## 2021-02-26 DIAGNOSIS — Z01.419 ENCOUNTER FOR ANNUAL ROUTINE GYNECOLOGICAL EXAMINATION: Primary | ICD-10-CM

## 2021-02-26 DIAGNOSIS — R39.15 URINARY URGENCY: ICD-10-CM

## 2021-02-26 DIAGNOSIS — R68.82 LOW LIBIDO: ICD-10-CM

## 2021-02-26 DIAGNOSIS — Z12.11 COLON CANCER SCREENING: ICD-10-CM

## 2021-02-26 PROCEDURE — 99396 PREV VISIT EST AGE 40-64: CPT | Mod: S$GLB,,, | Performed by: OBSTETRICS & GYNECOLOGY

## 2021-02-26 PROCEDURE — 87086 URINE CULTURE/COLONY COUNT: CPT

## 2021-02-26 PROCEDURE — 99999 PR PBB SHADOW E&M-EST. PATIENT-LVL III: ICD-10-PCS | Mod: PBBFAC,,, | Performed by: OBSTETRICS & GYNECOLOGY

## 2021-02-26 PROCEDURE — 3074F SYST BP LT 130 MM HG: CPT | Mod: CPTII,S$GLB,, | Performed by: OBSTETRICS & GYNECOLOGY

## 2021-02-26 PROCEDURE — 99396 PR PREVENTIVE VISIT,EST,40-64: ICD-10-PCS | Mod: S$GLB,,, | Performed by: OBSTETRICS & GYNECOLOGY

## 2021-02-26 PROCEDURE — 1126F AMNT PAIN NOTED NONE PRSNT: CPT | Mod: S$GLB,,, | Performed by: OBSTETRICS & GYNECOLOGY

## 2021-02-26 PROCEDURE — 3008F BODY MASS INDEX DOCD: CPT | Mod: CPTII,S$GLB,, | Performed by: OBSTETRICS & GYNECOLOGY

## 2021-02-26 PROCEDURE — 3078F PR MOST RECENT DIASTOLIC BLOOD PRESSURE < 80 MM HG: ICD-10-PCS | Mod: CPTII,S$GLB,, | Performed by: OBSTETRICS & GYNECOLOGY

## 2021-02-26 PROCEDURE — 3074F PR MOST RECENT SYSTOLIC BLOOD PRESSURE < 130 MM HG: ICD-10-PCS | Mod: CPTII,S$GLB,, | Performed by: OBSTETRICS & GYNECOLOGY

## 2021-02-26 PROCEDURE — 3078F DIAST BP <80 MM HG: CPT | Mod: CPTII,S$GLB,, | Performed by: OBSTETRICS & GYNECOLOGY

## 2021-02-26 PROCEDURE — 3008F PR BODY MASS INDEX (BMI) DOCUMENTED: ICD-10-PCS | Mod: CPTII,S$GLB,, | Performed by: OBSTETRICS & GYNECOLOGY

## 2021-02-26 PROCEDURE — 1126F PR PAIN SEVERITY QUANTIFIED, NO PAIN PRESENT: ICD-10-PCS | Mod: S$GLB,,, | Performed by: OBSTETRICS & GYNECOLOGY

## 2021-02-26 PROCEDURE — 99999 PR PBB SHADOW E&M-EST. PATIENT-LVL III: CPT | Mod: PBBFAC,,, | Performed by: OBSTETRICS & GYNECOLOGY

## 2021-02-26 RX ORDER — IBUPROFEN 800 MG/1
TABLET ORAL
COMMUNITY
End: 2021-04-19

## 2021-02-26 RX ORDER — ESTRADIOL 0.05 MG/D
1 FILM, EXTENDED RELEASE TRANSDERMAL
Qty: 24 PATCH | Refills: 3 | Status: SHIPPED | OUTPATIENT
Start: 2021-03-01 | End: 2021-05-19

## 2021-02-26 RX ORDER — AMOXICILLIN AND CLAVULANATE POTASSIUM 875; 125 MG/1; MG/1
TABLET, FILM COATED ORAL
COMMUNITY
Start: 2021-02-23 | End: 2021-04-19

## 2021-02-27 LAB — BACTERIA UR CULT: NO GROWTH

## 2021-03-03 ENCOUNTER — HOSPITAL ENCOUNTER (OUTPATIENT)
Dept: RADIOLOGY | Facility: HOSPITAL | Age: 51
Discharge: HOME | End: 2021-03-03
Attending: INTERNAL MEDICINE
Payer: COMMERCIAL

## 2021-03-03 VITALS — WEIGHT: 174 LBS | BODY MASS INDEX: 29.71 KG/M2 | HEIGHT: 64 IN

## 2021-03-03 DIAGNOSIS — Z12.31 OTHER SCREENING MAMMOGRAM: ICD-10-CM

## 2021-03-03 PROCEDURE — 77067 SCR MAMMO BI INCL CAD: CPT | Mod: 26,,, | Performed by: RADIOLOGY

## 2021-03-03 PROCEDURE — 77063 MAMMO DIGITAL SCREENING BILAT WITH TOMO: ICD-10-PCS | Mod: 26,,, | Performed by: RADIOLOGY

## 2021-03-03 PROCEDURE — 77067 MAMMO DIGITAL SCREENING BILAT WITH TOMO: ICD-10-PCS | Mod: 26,,, | Performed by: RADIOLOGY

## 2021-03-03 PROCEDURE — 77067 SCR MAMMO BI INCL CAD: CPT | Mod: TC

## 2021-03-03 PROCEDURE — 77063 BREAST TOMOSYNTHESIS BI: CPT | Mod: 26,,, | Performed by: RADIOLOGY

## 2021-03-12 ENCOUNTER — PATIENT MESSAGE (OUTPATIENT)
Dept: NEUROLOGY | Facility: CLINIC | Age: 51
End: 2021-03-12

## 2021-03-17 ENCOUNTER — PATIENT MESSAGE (OUTPATIENT)
Dept: INTERNAL MEDICINE | Facility: CLINIC | Age: 51
End: 2021-03-17

## 2021-03-17 DIAGNOSIS — J32.9 SINUSITIS, UNSPECIFIED CHRONICITY, UNSPECIFIED LOCATION: Primary | ICD-10-CM

## 2021-03-19 ENCOUNTER — TELEPHONE (OUTPATIENT)
Dept: OTOLARYNGOLOGY | Facility: CLINIC | Age: 51
End: 2021-03-19

## 2021-03-22 ENCOUNTER — TELEPHONE (OUTPATIENT)
Dept: OTOLARYNGOLOGY | Facility: CLINIC | Age: 51
End: 2021-03-22

## 2021-03-23 ENCOUNTER — OFFICE VISIT (OUTPATIENT)
Dept: OTOLARYNGOLOGY | Facility: CLINIC | Age: 51
End: 2021-03-23
Payer: COMMERCIAL

## 2021-03-23 DIAGNOSIS — J30.89 NON-SEASONAL ALLERGIC RHINITIS, UNSPECIFIED TRIGGER: Primary | ICD-10-CM

## 2021-03-23 DIAGNOSIS — J32.9 SINUSITIS, UNSPECIFIED CHRONICITY, UNSPECIFIED LOCATION: ICD-10-CM

## 2021-03-23 PROCEDURE — 99999 PR PBB SHADOW E&M-EST. PATIENT-LVL II: ICD-10-PCS | Mod: PBBFAC,,, | Performed by: NURSE PRACTITIONER

## 2021-03-23 PROCEDURE — 99999 PR PBB SHADOW E&M-EST. PATIENT-LVL II: CPT | Mod: PBBFAC,,, | Performed by: NURSE PRACTITIONER

## 2021-03-23 PROCEDURE — 99213 OFFICE O/P EST LOW 20 MIN: CPT | Mod: S$GLB,,, | Performed by: NURSE PRACTITIONER

## 2021-03-23 PROCEDURE — 99213 PR OFFICE/OUTPT VISIT, EST, LEVL III, 20-29 MIN: ICD-10-PCS | Mod: S$GLB,,, | Performed by: NURSE PRACTITIONER

## 2021-04-01 ENCOUNTER — CLINICAL SUPPORT (OUTPATIENT)
Dept: REHABILITATION | Facility: HOSPITAL | Age: 51
End: 2021-04-01
Attending: OBSTETRICS & GYNECOLOGY
Payer: COMMERCIAL

## 2021-04-01 DIAGNOSIS — R39.15 URINARY URGENCY: ICD-10-CM

## 2021-04-01 PROCEDURE — 97110 THERAPEUTIC EXERCISES: CPT

## 2021-04-01 PROCEDURE — 97162 PT EVAL MOD COMPLEX 30 MIN: CPT

## 2021-04-13 ENCOUNTER — PATIENT MESSAGE (OUTPATIENT)
Dept: INTERNAL MEDICINE | Facility: CLINIC | Age: 51
End: 2021-04-13

## 2021-04-13 DIAGNOSIS — L60.0 INGROWN TOENAIL: Primary | ICD-10-CM

## 2021-04-15 ENCOUNTER — PATIENT OUTREACH (OUTPATIENT)
Dept: ADMINISTRATIVE | Facility: OTHER | Age: 51
End: 2021-04-15

## 2021-04-19 ENCOUNTER — OFFICE VISIT (OUTPATIENT)
Dept: PODIATRY | Facility: CLINIC | Age: 51
End: 2021-04-19
Payer: COMMERCIAL

## 2021-04-19 ENCOUNTER — HOSPITAL ENCOUNTER (OUTPATIENT)
Dept: RADIOLOGY | Facility: HOSPITAL | Age: 51
Discharge: HOME | End: 2021-04-19
Attending: PODIATRIST
Payer: COMMERCIAL

## 2021-04-19 VITALS
WEIGHT: 173.94 LBS | HEIGHT: 64 IN | DIASTOLIC BLOOD PRESSURE: 90 MMHG | HEART RATE: 80 BPM | SYSTOLIC BLOOD PRESSURE: 143 MMHG | BODY MASS INDEX: 29.7 KG/M2

## 2021-04-19 DIAGNOSIS — M79.675 PAIN OF LEFT GREAT TOE: ICD-10-CM

## 2021-04-19 DIAGNOSIS — G57.62 MORTON'S NEUROMA OF SECOND INTERSPACE OF LEFT FOOT: ICD-10-CM

## 2021-04-19 DIAGNOSIS — M67.472 DIGITAL MUCINOUS CYST OF TOE OF LEFT FOOT: Primary | ICD-10-CM

## 2021-04-19 PROCEDURE — 73630 X-RAY EXAM OF FOOT: CPT | Mod: TC,PN,LT

## 2021-04-19 PROCEDURE — 99999 PR PBB SHADOW E&M-EST. PATIENT-LVL IV: ICD-10-PCS | Mod: PBBFAC,,, | Performed by: PODIATRIST

## 2021-04-19 PROCEDURE — 73630 X-RAY EXAM OF FOOT: CPT | Mod: 26,LT,, | Performed by: RADIOLOGY

## 2021-04-19 PROCEDURE — 1126F AMNT PAIN NOTED NONE PRSNT: CPT | Mod: S$GLB,,, | Performed by: PODIATRIST

## 2021-04-19 PROCEDURE — 99999 PR PBB SHADOW E&M-EST. PATIENT-LVL IV: CPT | Mod: PBBFAC,,, | Performed by: PODIATRIST

## 2021-04-19 PROCEDURE — 99203 OFFICE O/P NEW LOW 30 MIN: CPT | Mod: S$GLB,,, | Performed by: PODIATRIST

## 2021-04-19 PROCEDURE — 99203 PR OFFICE/OUTPT VISIT, NEW, LEVL III, 30-44 MIN: ICD-10-PCS | Mod: S$GLB,,, | Performed by: PODIATRIST

## 2021-04-19 PROCEDURE — 3008F BODY MASS INDEX DOCD: CPT | Mod: CPTII,S$GLB,, | Performed by: PODIATRIST

## 2021-04-19 PROCEDURE — 1126F PR PAIN SEVERITY QUANTIFIED, NO PAIN PRESENT: ICD-10-PCS | Mod: S$GLB,,, | Performed by: PODIATRIST

## 2021-04-19 PROCEDURE — 3008F PR BODY MASS INDEX (BMI) DOCUMENTED: ICD-10-PCS | Mod: CPTII,S$GLB,, | Performed by: PODIATRIST

## 2021-04-19 PROCEDURE — 73630 XR FOOT COMPLETE 3 VIEW LEFT: ICD-10-PCS | Mod: 26,LT,, | Performed by: RADIOLOGY

## 2021-04-22 ENCOUNTER — PATIENT MESSAGE (OUTPATIENT)
Dept: PODIATRY | Facility: CLINIC | Age: 51
End: 2021-04-22

## 2021-04-23 ENCOUNTER — PATIENT MESSAGE (OUTPATIENT)
Dept: OBSTETRICS AND GYNECOLOGY | Facility: CLINIC | Age: 51
End: 2021-04-23

## 2021-05-06 ENCOUNTER — CLINICAL SUPPORT (OUTPATIENT)
Dept: REHABILITATION | Facility: HOSPITAL | Age: 51
End: 2021-05-06
Attending: OBSTETRICS & GYNECOLOGY
Payer: COMMERCIAL

## 2021-05-06 DIAGNOSIS — R39.15 URINARY URGENCY: Primary | ICD-10-CM

## 2021-05-06 PROCEDURE — 97110 THERAPEUTIC EXERCISES: CPT | Mod: 97

## 2021-05-09 ENCOUNTER — PATIENT MESSAGE (OUTPATIENT)
Dept: OTOLARYNGOLOGY | Facility: CLINIC | Age: 51
End: 2021-05-09

## 2021-05-17 ENCOUNTER — OFFICE VISIT (OUTPATIENT)
Dept: OTOLARYNGOLOGY | Facility: CLINIC | Age: 51
End: 2021-05-17
Payer: COMMERCIAL

## 2021-05-17 DIAGNOSIS — R09.A2 GLOBUS SENSATION: ICD-10-CM

## 2021-05-17 DIAGNOSIS — R07.0 THROAT DISCOMFORT: Primary | ICD-10-CM

## 2021-05-17 DIAGNOSIS — J30.89 NON-SEASONAL ALLERGIC RHINITIS, UNSPECIFIED TRIGGER: ICD-10-CM

## 2021-05-17 PROCEDURE — 99999 PR PBB SHADOW E&M-EST. PATIENT-LVL II: CPT | Mod: PBBFAC,,, | Performed by: NURSE PRACTITIONER

## 2021-05-17 PROCEDURE — 1126F AMNT PAIN NOTED NONE PRSNT: CPT | Mod: S$GLB,,, | Performed by: NURSE PRACTITIONER

## 2021-05-17 PROCEDURE — 31575 LARYNGOSCOPY: ICD-10-PCS | Mod: S$GLB,,, | Performed by: NURSE PRACTITIONER

## 2021-05-17 PROCEDURE — 99213 OFFICE O/P EST LOW 20 MIN: CPT | Mod: 25,S$GLB,, | Performed by: NURSE PRACTITIONER

## 2021-05-17 PROCEDURE — 99213 PR OFFICE/OUTPT VISIT, EST, LEVL III, 20-29 MIN: ICD-10-PCS | Mod: 25,S$GLB,, | Performed by: NURSE PRACTITIONER

## 2021-05-17 PROCEDURE — 99999 PR PBB SHADOW E&M-EST. PATIENT-LVL II: ICD-10-PCS | Mod: PBBFAC,,, | Performed by: NURSE PRACTITIONER

## 2021-05-17 PROCEDURE — 31575 DIAGNOSTIC LARYNGOSCOPY: CPT | Mod: S$GLB,,, | Performed by: NURSE PRACTITIONER

## 2021-05-17 PROCEDURE — 1126F PR PAIN SEVERITY QUANTIFIED, NO PAIN PRESENT: ICD-10-PCS | Mod: S$GLB,,, | Performed by: NURSE PRACTITIONER

## 2021-05-19 ENCOUNTER — TELEPHONE (OUTPATIENT)
Dept: INTERNAL MEDICINE | Facility: CLINIC | Age: 51
End: 2021-05-19

## 2021-05-19 ENCOUNTER — PATIENT MESSAGE (OUTPATIENT)
Dept: INTERNAL MEDICINE | Facility: CLINIC | Age: 51
End: 2021-05-19

## 2021-05-19 RX ORDER — ESTRADIOL 0.5 MG/1
0.5 TABLET ORAL DAILY
Qty: 90 TABLET | Refills: 3 | Status: SHIPPED | OUTPATIENT
Start: 2021-05-19 | End: 2022-07-26

## 2021-06-03 ENCOUNTER — PATIENT OUTREACH (OUTPATIENT)
Dept: ADMINISTRATIVE | Facility: HOSPITAL | Age: 51
End: 2021-06-03

## 2021-06-03 ENCOUNTER — PATIENT MESSAGE (OUTPATIENT)
Dept: ADMINISTRATIVE | Facility: HOSPITAL | Age: 51
End: 2021-06-03

## 2021-06-03 DIAGNOSIS — Z12.11 SCREENING FOR COLON CANCER: Primary | ICD-10-CM

## 2021-06-06 ENCOUNTER — PATIENT MESSAGE (OUTPATIENT)
Dept: OPTOMETRY | Facility: CLINIC | Age: 51
End: 2021-06-06

## 2021-06-08 ENCOUNTER — PATIENT OUTREACH (OUTPATIENT)
Dept: ADMINISTRATIVE | Facility: OTHER | Age: 51
End: 2021-06-08

## 2021-06-09 ENCOUNTER — OFFICE VISIT (OUTPATIENT)
Dept: OPTOMETRY | Facility: CLINIC | Age: 51
End: 2021-06-09
Payer: COMMERCIAL

## 2021-06-09 DIAGNOSIS — H15.102 EPISCLERITIS, LEFT: Primary | ICD-10-CM

## 2021-06-09 DIAGNOSIS — H10.13 ALLERGIC CONJUNCTIVITIS, BILATERAL: ICD-10-CM

## 2021-06-09 PROCEDURE — 92012 PR EYE EXAM, EST PATIENT,INTERMED: ICD-10-PCS | Mod: S$GLB,,, | Performed by: OPTOMETRIST

## 2021-06-09 PROCEDURE — 99999 PR PBB SHADOW E&M-EST. PATIENT-LVL III: CPT | Mod: PBBFAC,,, | Performed by: OPTOMETRIST

## 2021-06-09 PROCEDURE — 99999 PR PBB SHADOW E&M-EST. PATIENT-LVL III: ICD-10-PCS | Mod: PBBFAC,,, | Performed by: OPTOMETRIST

## 2021-06-09 PROCEDURE — 1126F AMNT PAIN NOTED NONE PRSNT: CPT | Mod: S$GLB,,, | Performed by: OPTOMETRIST

## 2021-06-09 PROCEDURE — 1126F PR PAIN SEVERITY QUANTIFIED, NO PAIN PRESENT: ICD-10-PCS | Mod: S$GLB,,, | Performed by: OPTOMETRIST

## 2021-06-09 PROCEDURE — 92012 INTRM OPH EXAM EST PATIENT: CPT | Mod: S$GLB,,, | Performed by: OPTOMETRIST

## 2021-07-06 ENCOUNTER — PATIENT MESSAGE (OUTPATIENT)
Dept: INTERNAL MEDICINE | Facility: CLINIC | Age: 51
End: 2021-07-06

## 2021-07-06 DIAGNOSIS — R09.A2 FOREIGN BODY SENSATION IN THROAT: Primary | ICD-10-CM

## 2021-07-12 ENCOUNTER — PATIENT MESSAGE (OUTPATIENT)
Dept: INTERNAL MEDICINE | Facility: CLINIC | Age: 51
End: 2021-07-12

## 2021-07-14 ENCOUNTER — HOSPITAL ENCOUNTER (OUTPATIENT)
Dept: RADIOLOGY | Facility: HOSPITAL | Age: 51
Discharge: HOME | End: 2021-07-14
Attending: INTERNAL MEDICINE
Payer: COMMERCIAL

## 2021-07-14 DIAGNOSIS — R09.A2 FOREIGN BODY SENSATION IN THROAT: ICD-10-CM

## 2021-07-14 PROCEDURE — 76536 US EXAM OF HEAD AND NECK: CPT | Mod: TC

## 2021-07-14 PROCEDURE — 76536 US SOFT TISSUE HEAD NECK THYROID: ICD-10-PCS | Mod: 26,,, | Performed by: RADIOLOGY

## 2021-07-14 PROCEDURE — 76536 US EXAM OF HEAD AND NECK: CPT | Mod: 26,,, | Performed by: RADIOLOGY

## 2021-07-17 ENCOUNTER — PATIENT MESSAGE (OUTPATIENT)
Dept: INTERNAL MEDICINE | Facility: CLINIC | Age: 51
End: 2021-07-17

## 2021-07-17 DIAGNOSIS — R09.A2 FOREIGN BODY SENSATION IN THROAT: Primary | ICD-10-CM

## 2021-10-13 ENCOUNTER — DOCUMENTATION ONLY (OUTPATIENT)
Dept: REHABILITATION | Facility: HOSPITAL | Age: 51
End: 2021-10-13

## 2021-10-16 ENCOUNTER — PATIENT MESSAGE (OUTPATIENT)
Dept: INTERNAL MEDICINE | Facility: CLINIC | Age: 51
End: 2021-10-16
Payer: COMMERCIAL

## 2021-10-28 ENCOUNTER — PATIENT MESSAGE (OUTPATIENT)
Dept: NEUROLOGY | Facility: CLINIC | Age: 51
End: 2021-10-28
Payer: COMMERCIAL

## 2021-11-03 RX ORDER — MONTELUKAST SODIUM 10 MG/1
TABLET ORAL
Qty: 90 TABLET | Refills: 0 | Status: SHIPPED | OUTPATIENT
Start: 2021-11-03 | End: 2022-02-02

## 2021-11-04 ENCOUNTER — PATIENT MESSAGE (OUTPATIENT)
Dept: NEUROLOGY | Facility: CLINIC | Age: 51
End: 2021-11-04
Payer: COMMERCIAL

## 2021-11-04 ENCOUNTER — TELEPHONE (OUTPATIENT)
Dept: NEUROLOGY | Facility: CLINIC | Age: 51
End: 2021-11-04
Payer: COMMERCIAL

## 2021-11-05 DIAGNOSIS — G43.711 CHRONIC MIGRAINE WITHOUT AURA, WITH INTRACTABLE MIGRAINE, SO STATED, WITH STATUS MIGRAINOSUS: Primary | ICD-10-CM

## 2021-11-05 RX ORDER — RIZATRIPTAN BENZOATE 10 MG/1
10 TABLET ORAL 2 TIMES DAILY PRN
Qty: 9 TABLET | Refills: 12 | Status: SHIPPED | OUTPATIENT
Start: 2021-11-05 | End: 2021-11-15

## 2021-11-10 ENCOUNTER — IMMUNIZATION (OUTPATIENT)
Dept: INTERNAL MEDICINE | Facility: CLINIC | Age: 51
End: 2021-11-10
Payer: COMMERCIAL

## 2021-11-10 DIAGNOSIS — Z23 NEED FOR VACCINATION: Primary | ICD-10-CM

## 2021-11-10 DIAGNOSIS — Z12.11 SPECIAL SCREENING FOR MALIGNANT NEOPLASMS, COLON: Primary | ICD-10-CM

## 2021-11-10 PROCEDURE — 0064A COVID-19, MRNA, LNP-S, PF, 100 MCG/0.25 ML DOSE VACCINE (MODERNA BOOSTER): CPT | Mod: CV19,PBBFAC | Performed by: INTERNAL MEDICINE

## 2021-11-10 RX ORDER — SODIUM, POTASSIUM,MAG SULFATES 17.5-3.13G
1 SOLUTION, RECONSTITUTED, ORAL ORAL DAILY
Qty: 1 KIT | Refills: 0 | Status: SHIPPED | OUTPATIENT
Start: 2021-11-10 | End: 2021-11-12

## 2021-11-15 ENCOUNTER — TELEPHONE (OUTPATIENT)
Dept: NEUROLOGY | Facility: CLINIC | Age: 51
End: 2021-11-15
Payer: COMMERCIAL

## 2021-11-15 ENCOUNTER — TELEPHONE (OUTPATIENT)
Dept: PHARMACY | Facility: CLINIC | Age: 51
End: 2021-11-15
Payer: COMMERCIAL

## 2021-11-15 ENCOUNTER — OFFICE VISIT (OUTPATIENT)
Dept: NEUROLOGY | Facility: CLINIC | Age: 51
End: 2021-11-15
Payer: COMMERCIAL

## 2021-11-15 VITALS
SYSTOLIC BLOOD PRESSURE: 144 MMHG | WEIGHT: 177.94 LBS | HEIGHT: 64 IN | HEART RATE: 63 BPM | DIASTOLIC BLOOD PRESSURE: 89 MMHG | BODY MASS INDEX: 30.38 KG/M2

## 2021-11-15 DIAGNOSIS — G43.019 INTRACTABLE MIGRAINE WITHOUT AURA AND WITHOUT STATUS MIGRAINOSUS: Primary | ICD-10-CM

## 2021-11-15 PROCEDURE — 3079F DIAST BP 80-89 MM HG: CPT | Mod: CPTII,S$GLB,, | Performed by: PSYCHIATRY & NEUROLOGY

## 2021-11-15 PROCEDURE — 1159F PR MEDICATION LIST DOCUMENTED IN MEDICAL RECORD: ICD-10-PCS | Mod: CPTII,S$GLB,, | Performed by: PSYCHIATRY & NEUROLOGY

## 2021-11-15 PROCEDURE — 1160F PR REVIEW ALL MEDS BY PRESCRIBER/CLIN PHARMACIST DOCUMENTED: ICD-10-PCS | Mod: CPTII,S$GLB,, | Performed by: PSYCHIATRY & NEUROLOGY

## 2021-11-15 PROCEDURE — 3008F PR BODY MASS INDEX (BMI) DOCUMENTED: ICD-10-PCS | Mod: CPTII,S$GLB,, | Performed by: PSYCHIATRY & NEUROLOGY

## 2021-11-15 PROCEDURE — 99213 PR OFFICE/OUTPT VISIT, EST, LEVL III, 20-29 MIN: ICD-10-PCS | Mod: S$GLB,,, | Performed by: PSYCHIATRY & NEUROLOGY

## 2021-11-15 PROCEDURE — 3077F SYST BP >= 140 MM HG: CPT | Mod: CPTII,S$GLB,, | Performed by: PSYCHIATRY & NEUROLOGY

## 2021-11-15 PROCEDURE — 3079F PR MOST RECENT DIASTOLIC BLOOD PRESSURE 80-89 MM HG: ICD-10-PCS | Mod: CPTII,S$GLB,, | Performed by: PSYCHIATRY & NEUROLOGY

## 2021-11-15 PROCEDURE — 3077F PR MOST RECENT SYSTOLIC BLOOD PRESSURE >= 140 MM HG: ICD-10-PCS | Mod: CPTII,S$GLB,, | Performed by: PSYCHIATRY & NEUROLOGY

## 2021-11-15 PROCEDURE — 99999 PR PBB SHADOW E&M-EST. PATIENT-LVL III: ICD-10-PCS | Mod: PBBFAC,,, | Performed by: PSYCHIATRY & NEUROLOGY

## 2021-11-15 PROCEDURE — 99213 OFFICE O/P EST LOW 20 MIN: CPT | Mod: S$GLB,,, | Performed by: PSYCHIATRY & NEUROLOGY

## 2021-11-15 PROCEDURE — 1159F MED LIST DOCD IN RCRD: CPT | Mod: CPTII,S$GLB,, | Performed by: PSYCHIATRY & NEUROLOGY

## 2021-11-15 PROCEDURE — 3008F BODY MASS INDEX DOCD: CPT | Mod: CPTII,S$GLB,, | Performed by: PSYCHIATRY & NEUROLOGY

## 2021-11-15 PROCEDURE — 99999 PR PBB SHADOW E&M-EST. PATIENT-LVL III: CPT | Mod: PBBFAC,,, | Performed by: PSYCHIATRY & NEUROLOGY

## 2021-11-15 PROCEDURE — 1160F RVW MEDS BY RX/DR IN RCRD: CPT | Mod: CPTII,S$GLB,, | Performed by: PSYCHIATRY & NEUROLOGY

## 2021-12-01 ENCOUNTER — PATIENT MESSAGE (OUTPATIENT)
Dept: NEUROLOGY | Facility: CLINIC | Age: 51
End: 2021-12-01
Payer: COMMERCIAL

## 2021-12-12 ENCOUNTER — NURSE TRIAGE (OUTPATIENT)
Dept: ADMINISTRATIVE | Facility: CLINIC | Age: 51
End: 2021-12-12
Payer: COMMERCIAL

## 2021-12-14 ENCOUNTER — OFFICE VISIT (OUTPATIENT)
Dept: INTERNAL MEDICINE | Facility: CLINIC | Age: 51
End: 2021-12-14
Payer: COMMERCIAL

## 2021-12-14 VITALS
HEIGHT: 64 IN | DIASTOLIC BLOOD PRESSURE: 90 MMHG | WEIGHT: 178 LBS | BODY MASS INDEX: 30.39 KG/M2 | SYSTOLIC BLOOD PRESSURE: 140 MMHG

## 2021-12-14 DIAGNOSIS — E04.1 THYROID NODULE: ICD-10-CM

## 2021-12-14 DIAGNOSIS — M54.9 BILATERAL BACK PAIN, UNSPECIFIED BACK LOCATION, UNSPECIFIED CHRONICITY: Primary | ICD-10-CM

## 2021-12-14 PROCEDURE — 96372 THER/PROPH/DIAG INJ SC/IM: CPT | Mod: S$GLB,,, | Performed by: INTERNAL MEDICINE

## 2021-12-14 PROCEDURE — 99999 PR PBB SHADOW E&M-EST. PATIENT-LVL III: CPT | Mod: PBBFAC,,, | Performed by: INTERNAL MEDICINE

## 2021-12-14 PROCEDURE — 99214 OFFICE O/P EST MOD 30 MIN: CPT | Mod: 25,S$GLB,, | Performed by: INTERNAL MEDICINE

## 2021-12-14 PROCEDURE — 99999 PR PBB SHADOW E&M-EST. PATIENT-LVL III: ICD-10-PCS | Mod: PBBFAC,,, | Performed by: INTERNAL MEDICINE

## 2021-12-14 PROCEDURE — 99214 PR OFFICE/OUTPT VISIT, EST, LEVL IV, 30-39 MIN: ICD-10-PCS | Mod: 25,S$GLB,, | Performed by: INTERNAL MEDICINE

## 2021-12-14 PROCEDURE — 96372 PR INJECTION,THERAP/PROPH/DIAG2ST, IM OR SUBCUT: ICD-10-PCS | Mod: S$GLB,,, | Performed by: INTERNAL MEDICINE

## 2021-12-14 RX ORDER — TRIAMCINOLONE ACETONIDE 40 MG/ML
40 INJECTION, SUSPENSION INTRA-ARTICULAR; INTRAMUSCULAR
Status: COMPLETED | OUTPATIENT
Start: 2021-12-14 | End: 2021-12-14

## 2021-12-14 RX ORDER — NAPROXEN 500 MG/1
500 TABLET ORAL 2 TIMES DAILY
Qty: 20 TABLET | Refills: 0 | Status: SHIPPED | OUTPATIENT
Start: 2021-12-14 | End: 2022-06-01

## 2021-12-14 RX ORDER — METHOCARBAMOL 500 MG/1
500 TABLET, FILM COATED ORAL 4 TIMES DAILY
Qty: 40 TABLET | Refills: 0 | Status: SHIPPED | OUTPATIENT
Start: 2021-12-14 | End: 2021-12-24

## 2021-12-14 RX ORDER — METHYLPREDNISOLONE 4 MG/1
TABLET ORAL
Qty: 21 EACH | Refills: 0 | Status: SHIPPED | OUTPATIENT
Start: 2021-12-14 | End: 2022-01-04

## 2021-12-14 RX ADMIN — TRIAMCINOLONE ACETONIDE 40 MG: 40 INJECTION, SUSPENSION INTRA-ARTICULAR; INTRAMUSCULAR at 11:12

## 2021-12-17 ENCOUNTER — TELEPHONE (OUTPATIENT)
Dept: ENDOSCOPY | Facility: HOSPITAL | Age: 51
End: 2021-12-17
Payer: COMMERCIAL

## 2021-12-26 ENCOUNTER — NURSE TRIAGE (OUTPATIENT)
Dept: ADMINISTRATIVE | Facility: CLINIC | Age: 51
End: 2021-12-26
Payer: COMMERCIAL

## 2021-12-27 ENCOUNTER — PATIENT MESSAGE (OUTPATIENT)
Dept: ENDOSCOPY | Facility: HOSPITAL | Age: 51
End: 2021-12-27
Payer: COMMERCIAL

## 2021-12-27 ENCOUNTER — TELEPHONE (OUTPATIENT)
Dept: ENDOSCOPY | Facility: HOSPITAL | Age: 51
End: 2021-12-27
Payer: COMMERCIAL

## 2021-12-29 ENCOUNTER — ANESTHESIA EVENT (OUTPATIENT)
Dept: ENDOSCOPY | Facility: HOSPITAL | Age: 51
End: 2021-12-29
Payer: COMMERCIAL

## 2021-12-29 ENCOUNTER — HOSPITAL ENCOUNTER (OUTPATIENT)
Facility: HOSPITAL | Age: 51
Discharge: HOME | End: 2021-12-29
Attending: STUDENT IN AN ORGANIZED HEALTH CARE EDUCATION/TRAINING PROGRAM | Admitting: STUDENT IN AN ORGANIZED HEALTH CARE EDUCATION/TRAINING PROGRAM
Payer: COMMERCIAL

## 2021-12-29 ENCOUNTER — ANESTHESIA (OUTPATIENT)
Dept: ENDOSCOPY | Facility: HOSPITAL | Age: 51
End: 2021-12-29
Payer: COMMERCIAL

## 2021-12-29 VITALS
OXYGEN SATURATION: 98 % | BODY MASS INDEX: 30.73 KG/M2 | HEART RATE: 66 BPM | SYSTOLIC BLOOD PRESSURE: 145 MMHG | RESPIRATION RATE: 14 BRPM | DIASTOLIC BLOOD PRESSURE: 87 MMHG | TEMPERATURE: 98 F | HEIGHT: 64 IN | WEIGHT: 180 LBS

## 2021-12-29 DIAGNOSIS — Z12.11 COLON CANCER SCREENING: ICD-10-CM

## 2021-12-29 PROCEDURE — 37000009 HC ANESTHESIA EA ADD 15 MINS: Performed by: STUDENT IN AN ORGANIZED HEALTH CARE EDUCATION/TRAINING PROGRAM

## 2021-12-29 PROCEDURE — G0121 COLON CA SCRN NOT HI RSK IND: HCPCS | Performed by: STUDENT IN AN ORGANIZED HEALTH CARE EDUCATION/TRAINING PROGRAM

## 2021-12-29 PROCEDURE — G0121 COLON CA SCRN NOT HI RSK IND: ICD-10-PCS | Mod: ,,, | Performed by: STUDENT IN AN ORGANIZED HEALTH CARE EDUCATION/TRAINING PROGRAM

## 2021-12-29 PROCEDURE — 25000003 PHARM REV CODE 250: Performed by: STUDENT IN AN ORGANIZED HEALTH CARE EDUCATION/TRAINING PROGRAM

## 2021-12-29 PROCEDURE — G0121 COLON CA SCRN NOT HI RSK IND: HCPCS | Mod: ,,, | Performed by: STUDENT IN AN ORGANIZED HEALTH CARE EDUCATION/TRAINING PROGRAM

## 2021-12-29 PROCEDURE — E9220 PRA ENDO ANESTHESIA: ICD-10-PCS | Mod: ,,, | Performed by: NURSE ANESTHETIST, CERTIFIED REGISTERED

## 2021-12-29 PROCEDURE — 63600175 PHARM REV CODE 636 W HCPCS: Performed by: NURSE ANESTHETIST, CERTIFIED REGISTERED

## 2021-12-29 PROCEDURE — E9220 PRA ENDO ANESTHESIA: HCPCS | Mod: ,,, | Performed by: NURSE ANESTHETIST, CERTIFIED REGISTERED

## 2021-12-29 PROCEDURE — 37000008 HC ANESTHESIA 1ST 15 MINUTES: Performed by: STUDENT IN AN ORGANIZED HEALTH CARE EDUCATION/TRAINING PROGRAM

## 2021-12-29 RX ORDER — SODIUM CHLORIDE 9 MG/ML
INJECTION, SOLUTION INTRAVENOUS CONTINUOUS
Status: DISCONTINUED | OUTPATIENT
Start: 2021-12-29 | End: 2021-12-29 | Stop reason: HOSPADM

## 2021-12-29 RX ORDER — PROPOFOL 10 MG/ML
VIAL (ML) INTRAVENOUS CONTINUOUS PRN
Status: DISCONTINUED | OUTPATIENT
Start: 2021-12-29 | End: 2021-12-29

## 2021-12-29 RX ORDER — PROPOFOL 10 MG/ML
VIAL (ML) INTRAVENOUS
Status: DISCONTINUED | OUTPATIENT
Start: 2021-12-29 | End: 2021-12-29

## 2021-12-29 RX ORDER — LIDOCAINE HCL/PF 100 MG/5ML
SYRINGE (ML) INTRAVENOUS
Status: DISCONTINUED | OUTPATIENT
Start: 2021-12-29 | End: 2021-12-29

## 2021-12-29 RX ADMIN — SODIUM CHLORIDE: 0.9 INJECTION, SOLUTION INTRAVENOUS at 09:12

## 2021-12-29 RX ADMIN — Medication 50 MG: at 09:12

## 2021-12-29 RX ADMIN — PROPOFOL 200 MCG/KG/MIN: 10 INJECTION, EMULSION INTRAVENOUS at 09:12

## 2021-12-29 RX ADMIN — PROPOFOL 70 MG: 10 INJECTION, EMULSION INTRAVENOUS at 09:12

## 2021-12-29 NOTE — H&P
COLONOSCOPY HISTORY AND PHYSICAL EXAM    Procedure : Colonoscopy      INDICATIONS: asymptomatic screening exam    Family Hx of CRC: Uncle, uncertain age    Last Colonoscopy:  2010 for abdominal pain [place]  Findings: unremarkable       Past Medical History:   Diagnosis Date    Allergy     Anemia     History of migraine headaches     Migraine headache     Morbid obesity     Preeclampsia, severe 7/15/2013     Sedation Problems: NO  Family History   Problem Relation Age of Onset    Skin cancer Father     Melanoma Father     Skin cancer Brother     Melanoma Brother     Colon cancer Maternal Uncle     Miscarriages / Stillbirths Sister     Retinal detachment Mother     Macular degeneration Mother     Hypertension Mother     Aortic stenosis Mother     Hypertension Maternal Grandmother     Aortic stenosis Maternal Grandmother     Macular degeneration Maternal Grandfather     Cataracts Maternal Grandfather     Heart attack Maternal Grandfather     Heart disease Maternal Grandfather     Coronary artery disease Paternal Grandfather     No Known Problems Maternal Aunt     No Known Problems Paternal Aunt     No Known Problems Paternal Uncle     No Known Problems Paternal Grandmother     Amblyopia Neg Hx     Blindness Neg Hx     Cancer Neg Hx     Diabetes Neg Hx     Glaucoma Neg Hx     Strabismus Neg Hx     Stroke Neg Hx     Thyroid disease Neg Hx     Breast cancer Neg Hx     Ovarian cancer Neg Hx      Fam Hx of Sedation Problems: NO  Social History     Socioeconomic History    Marital status:    Occupational History    Occupation: director     Employer: other/[employer] out [employer]   Tobacco Use    Smoking status: Never Smoker    Smokeless tobacco: Never Used   Substance and Sexual Activity    Alcohol use: No    Drug use: No    Sexual activity: Yes     Partners: Male     Birth control/protection: See Surgical Hx, None   Other Topics Concern    Are you pregnant or think you  may be? 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  She is self-employed.  She is working from home.         Her  is the  of a [employer] school on the [address] [employer] which has 1000 students ages K. Through 8.  She  in 2010.  They have 2 daughters.    She had a previous marriage which ended in divorce       Review of Systems - Negative except   Respiratory ROS: no dyspnea  Cardiovascular ROS: no exertional chest pain  Gastrointestinal ROS: NO abdominal discomfort,  NO rectal bleeding  Musculoskeletal ROS: no muscular pain  Neurological ROS: no recent stroke    Physical Exam:  LMP 11/27/2015 (Exact Date)   Breastfeeding No   General: no distress  Head: normocephalic  Mallampati Score   Neck: supple, symmetrical, trachea midline  Lungs:  clear to auscultation bilaterally and normal respiratory effort  Heart: regular rate and rhythm and no murmur  Abdomen: soft, non-tender non-distented; bowel sounds normal; no masses,  no organomegaly  Extremities: no cyanosis or edema, or clubbing    ASA:  I    PLAN  COLONOSCOPY.    SedationPlan :MAC    The details of the procedure, the possible need for biopsy or polypectomy and the potential risks including bleeding, perforation, missed polyps were discussed in detail.

## 2021-12-29 NOTE — PROVATION PATIENT INSTRUCTIONS
Discharge Summary/Instructions after an Endoscopic Procedure  Patient Name: Cheryle Olivares  Patient MRN: [id number]  Patient YOB: 1970  Wednesday, December 29, 2021  Mahendra Archer MD  Dear patient,  As a result of recent federal legislation (The Federal Cures Act), you may   receive lab or pathology results from your procedure in your [facility]   account before your physician is able to contact you. Your physician or   their representative will relay the results to you with their   recommendations at their soonest availability.  Thank you,  RESTRICTIONS:  During your procedure today, you received medications for sedation.  These   medications may affect your judgment, balance and coordination.  Therefore,   for 24 hours, you have the following restrictions:   - DO NOT drive a car, operate machinery, make legal/financial decisions,   sign important papers or drink alcohol.    ACTIVITY:  Today: no heavy lifting, straining or running due to procedural   sedation/anesthesia.  The following day: return to full activity including work.  DIET:  Eat and drink normally unless instructed otherwise.     TREATMENT FOR COMMON SIDE EFFECTS:  - Mild abdominal pain, nausea, belching, bloating or excessive gas:  rest,   eat lightly and use a heating pad.  - Sore Throat: treat with throat lozenges and/or gargle with warm salt   water.  - Because air was used during the procedure, expelling large amounts of air   from your rectum or belching is normal.  - If a bowel prep was taken, you may not have a bowel movement for 1-3 days.    This is normal.  SYMPTOMS TO WATCH FOR AND REPORT TO YOUR PHYSICIAN:  1. Abdominal pain or bloating, other than gas cramps.  2. Chest pain.  3. Back pain.  4. Signs of infection such as: chills or fever occurring within 24 hours   after the procedure.  5. Rectal bleeding, which would show as bright red, maroon, or black stools.   (A tablespoon of blood from the rectum is not serious,  especially if   hemorrhoids are present.)  6. Vomiting.  7. Weakness or dizziness.  GO DIRECTLY TO THE NEAREST EMERGENCY ROOM IF YOU HAVE ANY OF THE FOLLOWING:      Difficulty breathing              Chills and/or fever over 101 F   Persistent vomiting and/or vomiting blood   Severe abdominal pain   Severe chest pain   Black, tarry stools   Bleeding- more than one tablespoon   Any other symptom or condition that you feel may need urgent attention  Your doctor recommends these additional instructions:  If any biopsies were taken, your doctors clinic will contact you in 1 to 2   weeks with any results.  - Discharge patient to home.   - Resume previous diet.   - Continue present medications.   - Repeat colonoscopy in 5 years for surveillance.   - Return to referring physician as previously scheduled.  For questions, problems or results please call your physician - Mahendra Acrher MD at Work:  [phone].  [facility] EMERGENCY ROOM PHONE NUMBER: [phone]  IF A COMPLICATION OR EMERGENCY SITUATION ARISES AND YOU ARE UNABLE TO REACH   YOUR PHYSICIAN - GO DIRECTLY TO THE EMERGENCY ROOM.  MD Mahendra Ring MD  12/29/2021 10:15:18 AM  This report has been verified and signed electronically.  Dear patient,  As a result of recent federal legislation (The Federal Cures Act), you may   receive lab or pathology results from your procedure in your [facility]   account before your physician is able to contact you. Your physician or   their representative will relay the results to you with their   recommendations at their soonest availability.  Thank you,  PROVATION

## 2022-01-08 ENCOUNTER — PATIENT MESSAGE (OUTPATIENT)
Dept: NEUROLOGY | Facility: CLINIC | Age: 52
End: 2022-01-08
Payer: COMMERCIAL

## 2022-01-28 ENCOUNTER — PATIENT OUTREACH (OUTPATIENT)
Dept: ADMINISTRATIVE | Facility: OTHER | Age: 52
End: 2022-01-28
Payer: COMMERCIAL

## 2022-01-28 NOTE — PROGRESS NOTES
Health Maintenance Due   Topic Date Due    Shingles Vaccine (2 of 2) 12/16/2021    Mammogram  03/03/2022     Updates were requested from care everywhere.  Chart was reviewed for overdue [facility] Encounters ([facility]) topics (CRS, Breast Cancer Screening, Eye exam)  Health Maintenance has been updated.  LINKS immunization registry triggered.  Immunizations were reconciled.

## 2022-01-31 ENCOUNTER — PROCEDURE VISIT (OUTPATIENT)
Dept: NEUROLOGY | Facility: CLINIC | Age: 52
End: 2022-01-31
Payer: COMMERCIAL

## 2022-01-31 VITALS
DIASTOLIC BLOOD PRESSURE: 93 MMHG | SYSTOLIC BLOOD PRESSURE: 142 MMHG | BODY MASS INDEX: 30.9 KG/M2 | HEIGHT: 64 IN | HEART RATE: 79 BPM

## 2022-01-31 DIAGNOSIS — G43.019 INTRACTABLE MIGRAINE WITHOUT AURA AND WITHOUT STATUS MIGRAINOSUS: Primary | ICD-10-CM

## 2022-01-31 PROCEDURE — 64615 CHEMODENERV MUSC MIGRAINE: CPT | Mod: S$GLB,,, | Performed by: PSYCHIATRY & NEUROLOGY

## 2022-01-31 PROCEDURE — 99499 NO LOS: ICD-10-PCS | Mod: S$GLB,,, | Performed by: PSYCHIATRY & NEUROLOGY

## 2022-01-31 PROCEDURE — 99499 UNLISTED E&M SERVICE: CPT | Mod: S$GLB,,, | Performed by: PSYCHIATRY & NEUROLOGY

## 2022-01-31 PROCEDURE — 64615 PR CHEMODENERVATION OF MUSCLE FOR CHRONIC MIGRAINE: ICD-10-PCS | Mod: S$GLB,,, | Performed by: PSYCHIATRY & NEUROLOGY

## 2022-01-31 NOTE — TELEPHONE ENCOUNTER
No new care gaps identified.  Powered by [employer] - Eye Tracking by [employer]. Reference number: [id number].   1/31/2022 12:17:14 AM CST

## 2022-01-31 NOTE — PROCEDURES
Follow up:   Overall unchanged   Still has nearly daily HAs  Ubrelvy  is effective     Prior note:   S/p PT, dry needling (SCM, occipitalis, trap) - very effective (but lasts only few days)   Clenched teeth - saw dentist, no solutions   HA are now 3/week. Also has days of CDH. Imitrex helps       Prior note:   Base of skull pain at early AM hours - radiates to front + dizziness      PT with dry needling helps when she has clusters of headaches   Stress is a trigger   Still doing yoga, meditation   Using mouth guard      Also reports throat 'spasms'. No benefit from cingulair      Taking Imitrex nearly daily      3/4/20 e-mail: Hello! First, I want to report that I'm still making amazing progress in reducing and almost eliminating my migraines! Physical therapy and stress reduction have been keys. I was recently able to add everyday exercise back into my routine, with few headaches afterward. This is miraculous! I have gone through all of my prescribed PT sessions, and I don't feel I need to continue on a regular basis. However, in order to continue to be physically active, both with at-home PT exercises and regular fitness (running, walking, yoga), it would be helpful to have PT every 2-3 weeks for awhile, as my muscles continue to adjust to better posture and more effective functioning. Is it possible to prescribe additional PT at less frequency? Thank you so much!     Prior note:   Stress -> Neck pain -> HA + nausea   overactivation of trap      Prior note:   Oct 2014 - diag with Hemicran cont (L) - pain points on the L side + photo/phono  Indomethacin 25mg PRN - helped   Currently 4/week Hz      Headache history:   Age of onset -  Childhood   Location - R or L   Nature of pain -  Throbbing   Prodrome - no   Aura -  No   Duration of headache - > 4 hrs   Time to peak intensity - hrs   Pain scale - range of intensity -  8/10  Frequency -  2-3/month   Status Migrainosus history - yes   Time of day of most headaches-  anytime      Associated symptoms with the headache:   Meningeal symptoms - photophobia, phonophobia, exercise intolerance +   Nausea/vomitting+   Nasal drainage   Visual blurriness   Pallor/flushing  Dizziness +   Vertigo  Confusion  Impaired concentration +   Pain worsened with physical activity +   Neck pain   Cheek pain on R +      Cluster headache symptoms: none      Symptoms of increased intracranial pressure: none      Basilar migraine symptoms: none      Headache Triggers:   Stress +   Bright or flickering light  Strong smell  Vigorous exercise  Dietary factors   Visual strain   Weather changes +   Exertion  Heat (hot weather, hot baths or showers)  Menses     Other comorbid conditions:  Anxiety - yes   Motion sickness symptom - no      Treatment history:  Resolution of headache with sleep - yes   Meds tried:  H/o Renal stones   Fioricet, relpax, xanax   Flexeril   Medrol dose   maxalt - sluggish   Antiviral - not help   accupressure - helped   accupunture - helped   PT - helps   estradiol 0.05 mg/24 hr td ptsw (VIVELLE-DOT) 0.05 mg/24 hr  - twice weekly patch - helps with hot flashes   Topamax 25 mg - not help   Gabapentin 100 mg  - drowsy, not help    mobic - helped      Headache risk factors:   H/o TBI  - no   H/o Meningitis  - no   F/h Aneurysms  - no     Headache burden:   In the last three months:  Days missed from work = several   Days unable to perform activities of daily living = 0  Days unable to attend social activities = several       Review of Systems   Constitutional: Negative.    HENT: Negative.    Eyes: Negative.    Respiratory: Negative.    Cardiovascular: Negative.    Gastrointestinal: Negative.    Endocrine: Negative.    Genitourinary: Negative.    Musculoskeletal: neck pain.    Skin: Negative.    Allergic/Immunologic: Negative.    Neurological: Negative.    Hematological: Negative.    Psychiatric/Behavioral: anxiety.        Objective:   Physical Exam   Constitutional: She is oriented to  person, place, and time. She appears well-developed and well-nourished.   Psychiatric: She has a normal mood and affect. Her behavior is normal. Judgment and thought content normal.       Assessment:       1. Intractable chronic migraine without aura   2. Myofacial pain (pressure points)- masseter, neck   3. Tonsillar hernia into foramen magnum      Her HA are unlikely from a TAC and more likely from her tonsillar herniation and posture      Neck and upper chest - vibrating sensation      MRI C spine  Impression       No focal disk abnormalities or significant central canal or neural foraminal stenosis.  ______________________________________     Electronically signed by resident: ELIUD LEVY MD  Date: 11/07/14  Time: 10:51      MRI brain  Impression        Approximate 3 mm of cerebellar tonsillar ectopia.    2-3 punctate foci of T2/flair signal hyperintensity frontal subcortical white matter which are nonspecific and of uncertain clinical significance.    Otherwise unremarkable MRI brain without evidence for acute infarction or enhancing lesion.    Electronically signed by: TRISH GOFF DO  Date: 10/24/14  Time: 14:27       Degenerative findings:   C2-C3: No spinal canal stenosis or neural foraminal narrowing.   C3-C4: Posterior disc osteophyte complex and left facet joint arthropathy resulting in mild left-sided neural foraminal narrowing.  No spinal canal stenosis.   C4-C5: Posterior disc osteophyte complex with no significant spinal canal stenosis or neural foraminal narrowing.   C5-C6: Posterior disc osteophyte complex with mild spinal canal stenosis.  No neural foraminal narrowing.   C6-C7: Posterior disc osteophyte complex with no significant spinal canal stenosis or neural foraminal narrowing.   C7-T1: No spinal canal stenosis or neural foraminal narrowing.  Redemonstration perineural cysts within the proximal extraforaminal space bilaterally at this level which possibly compressing on the exiting nerve  roots.   Paraspinal muscles & soft tissues: Unremarkable.     Impression:   1. Redemonstration of perineural cysts bilaterally at the level of C7-T1, as above.  2. No significant spinal canal stenosis or neural foraminal narrowing throughout the cervical spine, as above.     Electronically signed by resident: Macho Boone  Date:                                            08/20/2020  Time:                                           09:29     Electronically signed by: Mauricio Pitt MD  Date:                                            08/20/2020  Time:                                           09:55      Results for LAISHA HUMPHREY (MRN [id number]) as of 10/11/2019 09:44    Ref. Range 2/5/2019 10:19   Vitamin B-12 Latest Ref Range: 210 - 950 pg/mL >2000 (H)   Thiamine Latest Ref Range: 38 - 122 ug/L 45   Vit D, 25-Hydroxy Latest Ref Range: 30 - 96 ng/mL 75   Vitamin B2 Latest Ref Range: 1 - 19 mcg/L 18   Vitamin B6 Latest Ref Range: 5 - 50 ug/L 131 (H)   Antigliadin Ab IgG Latest Ref Range: <20 UNITS 2   Antigliadin Abs, IgA Latest Ref Range: <20 UNITS 7   TTG IgA Latest Ref Range: <20 UNITS 9   TTG IgG Latest Ref Range: <20 UNITS 3   Immunoglobulin A (IgA) Latest Ref Range: 70 - 400 mg/dL 172      Plan:   cont estrogen, cingulair   Breakthrough headache - imitrex 100 mg, xanax, mobic, ubrelvy, pain cream    BOTOX was performed as an indicated therapy for intractable chronic migraine headaches given that the patient failed > 2 prophylactic medications    Botulinum Toxin Injection Procedure   Pre-operative diagnosis: Chronic migraine   Post-operative diagnosis: Same   Procedure: Chemical neurolysis   After risks and benefits were explained including bleeding, infection, worsening of pain, damage to the areas being injected, weakness of muscles, loss of muscle control, dysphagia if injecting the head or neck, facial droop if injecting the facial area, painful injection, allergic or other reaction to the  medications being injected, and the failure of the procedure to help the problem, a signed consent was obtained.   The patient was placed in a comfortable area and the sites to be treated were identified.The area to be treated was prepped three times with alcohol and the alcohol allowed to dry. Next, a 30 gauge needle was used to inject the medication in the area to be treated.   Area(s) injected:   Total Botox used: 155 Units   Botox wastage: 45 Units   Injection sites:    muscle bilaterally ( a total of 10 units divided into 2 sites)   Procerus muscle (5 units)   Frontalis muscle bilaterally (a total of 20 units divided into 4 sites)   Temporalis muscle bilaterally (a total of 40 units divided into 8 sites)   Occipitalis muscle bilaterally (a total of 30 units divided into 6 sites)   Cervical paraspinal muscles (a total of 20 units divided into 4 sites)   Trapezius muscle bilaterally (a total of 30 units divided into 6 sites)   Complications: none   RTC for the next Botox injection: 3 months          Patient verbalized understanding to plan. I answered all her questions to her satisfaction

## 2022-02-02 RX ORDER — MONTELUKAST SODIUM 10 MG/1
TABLET ORAL
Qty: 90 TABLET | Refills: 0 | Status: SHIPPED | OUTPATIENT
Start: 2022-02-02 | End: 2022-05-23 | Stop reason: SDUPTHER

## 2022-02-02 NOTE — TELEPHONE ENCOUNTER
Refill Authorization Note   Cheryle Olivares  is requesting a refill authorization.  Brief Assessment and Rationale for Refill:  Approve     Medication Therapy Plan:       Medication Reconciliation Completed: No   Comments:   --->Care Gap information included below if applicable.       Requested Prescriptions   Pending Prescriptions Disp Refills    montelukast (SINGULAIR) 10 mg tablet [Pharmacy Med Name: MONTELUKAST SOD 10 MG TABLET] 90 tablet 0     Sig: TAKE 1 TABLET BY MOUTH EVERY DAY       Pulmonology:  Leukotriene Inhibitors Passed - 1/31/2022 12:16 AM        Passed - Patient is at least 18 years old        Passed - Negative Pregnancy Status Check        Passed - Valid encounter within last 15 months     Recent Visits  Date Type Provider Dept   11/10/20 Office Visit Any Krueger MD [facility] Internal Medicine   Showing recent visits within past 720 days and meeting all other requirements  Future Appointments  No visits were found meeting these conditions.  Showing future appointments within next 150 days and meeting all other requirements      Future Appointments              In 2 months MD [facility] - Neurology 7th Fl, [facility]                    Appointments  past 12m or future 3m with PCP    Date Provider   Last Visit   11/10/2020 Any Krueger MD   Next Visit   Visit date not found Any Krueger MD   ED visits in past 90 days: 0     Note composed:3:23 PM 02/02/2022

## 2022-03-03 ENCOUNTER — PATIENT MESSAGE (OUTPATIENT)
Dept: OBSTETRICS AND GYNECOLOGY | Facility: CLINIC | Age: 52
End: 2022-03-03
Payer: COMMERCIAL

## 2022-03-03 ENCOUNTER — TELEPHONE (OUTPATIENT)
Dept: OBSTETRICS AND GYNECOLOGY | Facility: CLINIC | Age: 52
End: 2022-03-03
Payer: COMMERCIAL

## 2022-03-03 DIAGNOSIS — Z12.31 BREAST CANCER SCREENING BY MAMMOGRAM: Primary | ICD-10-CM

## 2022-03-03 NOTE — TELEPHONE ENCOUNTER
----- Message from Richard Weiss sent at 3/3/2022 12:19 PM CST -----  Patient called requesting an order be submitted for scheduling her annual Mammo screening. Callback for confirmation [phone]

## 2022-03-15 ENCOUNTER — OFFICE VISIT (OUTPATIENT)
Dept: URGENT CARE | Facility: CLINIC | Age: 52
End: 2022-03-15
Payer: COMMERCIAL

## 2022-03-15 VITALS
DIASTOLIC BLOOD PRESSURE: 91 MMHG | WEIGHT: 180 LBS | TEMPERATURE: 98 F | HEART RATE: 77 BPM | HEIGHT: 64 IN | RESPIRATION RATE: 16 BRPM | BODY MASS INDEX: 30.73 KG/M2 | OXYGEN SATURATION: 98 % | SYSTOLIC BLOOD PRESSURE: 144 MMHG

## 2022-03-15 DIAGNOSIS — T78.40XA ALLERGIC REACTION, INITIAL ENCOUNTER: Primary | ICD-10-CM

## 2022-03-15 DIAGNOSIS — R21 RASH OF FACE: ICD-10-CM

## 2022-03-15 PROCEDURE — 1159F PR MEDICATION LIST DOCUMENTED IN MEDICAL RECORD: ICD-10-PCS | Mod: CPTII,S$GLB,, | Performed by: NURSE PRACTITIONER

## 2022-03-15 PROCEDURE — 3077F PR MOST RECENT SYSTOLIC BLOOD PRESSURE >= 140 MM HG: ICD-10-PCS | Mod: CPTII,S$GLB,, | Performed by: NURSE PRACTITIONER

## 2022-03-15 PROCEDURE — 99213 OFFICE O/P EST LOW 20 MIN: CPT | Mod: S$GLB,,, | Performed by: NURSE PRACTITIONER

## 2022-03-15 PROCEDURE — 1160F PR REVIEW ALL MEDS BY PRESCRIBER/CLIN PHARMACIST DOCUMENTED: ICD-10-PCS | Mod: CPTII,S$GLB,, | Performed by: NURSE PRACTITIONER

## 2022-03-15 PROCEDURE — 3008F BODY MASS INDEX DOCD: CPT | Mod: CPTII,S$GLB,, | Performed by: NURSE PRACTITIONER

## 2022-03-15 PROCEDURE — 3080F DIAST BP >= 90 MM HG: CPT | Mod: CPTII,S$GLB,, | Performed by: NURSE PRACTITIONER

## 2022-03-15 PROCEDURE — 3008F PR BODY MASS INDEX (BMI) DOCUMENTED: ICD-10-PCS | Mod: CPTII,S$GLB,, | Performed by: NURSE PRACTITIONER

## 2022-03-15 PROCEDURE — 1160F RVW MEDS BY RX/DR IN RCRD: CPT | Mod: CPTII,S$GLB,, | Performed by: NURSE PRACTITIONER

## 2022-03-15 PROCEDURE — 1159F MED LIST DOCD IN RCRD: CPT | Mod: CPTII,S$GLB,, | Performed by: NURSE PRACTITIONER

## 2022-03-15 PROCEDURE — 99213 PR OFFICE/OUTPT VISIT, EST, LEVL III, 20-29 MIN: ICD-10-PCS | Mod: S$GLB,,, | Performed by: NURSE PRACTITIONER

## 2022-03-15 PROCEDURE — 3080F PR MOST RECENT DIASTOLIC BLOOD PRESSURE >= 90 MM HG: ICD-10-PCS | Mod: CPTII,S$GLB,, | Performed by: NURSE PRACTITIONER

## 2022-03-15 PROCEDURE — 3077F SYST BP >= 140 MM HG: CPT | Mod: CPTII,S$GLB,, | Performed by: NURSE PRACTITIONER

## 2022-03-15 RX ORDER — METHYLPREDNISOLONE 4 MG/1
TABLET ORAL
Qty: 21 EACH | Refills: 0 | Status: SHIPPED | OUTPATIENT
Start: 2022-03-15 | End: 2022-04-05

## 2022-03-15 RX ORDER — FAMOTIDINE 20 MG/1
20 TABLET, FILM COATED ORAL 2 TIMES DAILY
Qty: 10 TABLET | Refills: 0 | Status: SHIPPED | OUTPATIENT
Start: 2022-03-15 | End: 2022-06-01

## 2022-03-15 NOTE — PATIENT INSTRUCTIONS
INSTRUCTIONS:  - Rest.  - Drink plenty of fluids.  - Take Tylenol and/or Ibuprofen as directed as needed for fever/pain.  Do not take more than the recommended dose.  - follow up with your PCP within the next 1-2 weeks as needed.  - You must understand that you have received an Urgent Care treatment only and that you may be released before all of your medical problems are known or treated.   - You, the patient, will arrange for follow up care as instructed.   - If your condition worsens or fails to improve we recommend that you receive another evaluation at the ER immediately or contact your PCP to discuss your concerns.   - You can call [phone] or [phone] to help schedule an appointment with the appropriate provider.

## 2022-03-15 NOTE — PROGRESS NOTES
"Subjective:       Patient ID: Cheryle Olivares is a 52 y.o. female.    Vitals:  height is 5' 4" (1.626 m) and weight is 81.6 kg (180 lb). Her oral temperature is 98.2 °F (36.8 °C). Her blood pressure is 144/91 (abnormal) and her pulse is 77. Her respiration is 16 and oxygen saturation is 98%.     Chief Complaint: Rash (Suspect allergic reaction to puffs plus lotion - Entered by patient)    52-year-old female presents today with complaints of a rash to her face that started on Sunday.  Patient reports that she used puffs plus lotion wipes to clean her makeup from under her eyes on Sunday.  She reports that the rash started after using the wipes.  Patient reports burning to her face and rash area.  Denies shortness of breath, cp, fever, or chills.  Patient reports that she has been taking Zyrtec and applying coconut oil at home for her symptoms.  Respirations are even and nonlabored.  Patient in no acute distress.    Rash  This is a new problem. The current episode started in the past 7 days (3 days). The problem has been gradually worsening since onset. The affected locations include the face. The rash is characterized by burning, itchiness, pain and redness. Associated with: puffs with lotion. Pertinent negatives include no congestion, cough, diarrhea, eye pain, fatigue, fever, shortness of breath, sore throat or vomiting. Past treatments include cold compress, anti-itch cream and antihistamine (bendryl ). Her past medical history is significant for eczema. There is no history of allergies.       Constitution: Negative. Negative for chills, sweating, fatigue and fever.   HENT: Negative.  Negative for ear pain, facial swelling, congestion and sore throat.    Neck: Negative for painful lymph nodes.   Cardiovascular: Negative.  Negative for chest trauma, chest pain and sob on exertion.   Eyes: Negative.  Negative for eye itching and eye pain.   Respiratory: Negative.  Negative for chest tightness, cough, shortness " of breath and asthma.    Gastrointestinal: Negative.  Negative for nausea, vomiting and diarrhea.   Endocrine: negative. cold intolerance and excessive thirst.   Genitourinary: Negative.  Negative for dysuria, frequency, urgency and hematuria.   Musculoskeletal: Negative for pain, trauma and joint pain.   Skin: Positive for rash. Negative for wound, erythema and hives.   Allergic/Immunologic: Negative.  Negative for eczema, asthma, hives and itching.   Neurological: Negative.  Negative for disorientation and altered mental status.   Hematologic/Lymphatic: Negative.  Negative for swollen lymph nodes.   Psychiatric/Behavioral: Negative.  Negative for altered mental status, disorientation and confusion.       Objective:      Physical Exam   Constitutional: She is oriented to person, place, and time. She appears well-developed.   HENT:   Head: Normocephalic and atraumatic. Head is without abrasion, without contusion and without laceration.   Ears:   Right Ear: External ear normal.   Left Ear: External ear normal.   Nose: Nose normal.   Mouth/Throat: Oropharynx is clear and moist and mucous membranes are normal.   Eyes: Conjunctivae, EOM and lids are normal. Pupils are equal, round, and reactive to light.   Neck: Trachea normal and phonation normal. Neck supple.   Cardiovascular: Normal rate, regular rhythm and normal heart sounds.   Pulmonary/Chest: Effort normal and breath sounds normal. No stridor. No respiratory distress.   Musculoskeletal: Normal range of motion.         General: Normal range of motion.   Neurological: She is alert and oriented to person, place, and time.   Skin: Skin is warm, dry, intact and rash (Red scaly rash noted under both eyes and cheek area with mild swelling.). Capillary refill takes less than 2 seconds. No abrasion, No burn, No bruising, No erythema and No ecchymosis   Psychiatric: Her speech is normal and behavior is normal. Judgment and thought content normal.   Nursing note and vitals  reviewed.            Assessment:       1. Allergic reaction, initial encounter    2. Rash of face          Plan:       FOLLOWUP  Follow up if symptoms worsen or fail to improve, for PLEASE CONTACT PCP OR CONTACT THE EMERGENCY ROOM..     PATIENT INSTRUCTIONS  Patient Instructions   INSTRUCTIONS:  - Rest.  - Drink plenty of fluids.  - Take Tylenol and/or Ibuprofen as directed as needed for fever/pain.  Do not take more than the recommended dose.  - follow up with your PCP within the next 1-2 weeks as needed.  - You must understand that you have received an Urgent Care treatment only and that you may be released before all of your medical problems are known or treated.   - You, the patient, will arrange for follow up care as instructed.   - If your condition worsens or fails to improve we recommend that you receive another evaluation at the ER immediately or contact your PCP to discuss your concerns.   - You can call [phone] or [phone] to help schedule an appointment with the appropriate provider.              THANK YOU FOR ALLOWING ME TO PARTICIPATE IN YOUR HEALTHCARE,     Samm Gardner NP   Allergic reaction, initial encounter  -     famotidine (PEPCID) 20 MG tablet; Take 1 tablet (20 mg total) by mouth 2 (two) times daily. for 5 days  Dispense: 10 tablet; Refill: 0  -     methylPREDNISolone (MEDROL DOSEPACK) 4 mg tablet; use as directed  Dispense: 21 each; Refill: 0    Rash of face  -     famotidine (PEPCID) 20 MG tablet; Take 1 tablet (20 mg total) by mouth 2 (two) times daily. for 5 days  Dispense: 10 tablet; Refill: 0  -     methylPREDNISolone (MEDROL DOSEPACK) 4 mg tablet; use as directed  Dispense: 21 each; Refill: 0

## 2022-03-24 ENCOUNTER — PATIENT MESSAGE (OUTPATIENT)
Dept: OPTOMETRY | Facility: CLINIC | Age: 52
End: 2022-03-24
Payer: COMMERCIAL

## 2022-03-25 ENCOUNTER — TELEPHONE (OUTPATIENT)
Dept: OPHTHALMOLOGY | Facility: CLINIC | Age: 52
End: 2022-03-25
Payer: COMMERCIAL

## 2022-03-25 ENCOUNTER — TELEPHONE (OUTPATIENT)
Dept: OPTOMETRY | Facility: CLINIC | Age: 52
End: 2022-03-25
Payer: COMMERCIAL

## 2022-03-25 ENCOUNTER — HOSPITAL ENCOUNTER (OUTPATIENT)
Dept: RADIOLOGY | Facility: HOSPITAL | Age: 52
Discharge: HOME | End: 2022-03-25
Attending: OBSTETRICS & GYNECOLOGY
Payer: COMMERCIAL

## 2022-03-25 VITALS — HEIGHT: 64 IN | BODY MASS INDEX: 30.73 KG/M2 | WEIGHT: 180 LBS

## 2022-03-25 DIAGNOSIS — Z12.31 BREAST CANCER SCREENING BY MAMMOGRAM: ICD-10-CM

## 2022-03-25 PROCEDURE — 77067 SCR MAMMO BI INCL CAD: CPT | Mod: 26,,, | Performed by: RADIOLOGY

## 2022-03-25 PROCEDURE — 77063 BREAST TOMOSYNTHESIS BI: CPT | Mod: 26,,, | Performed by: RADIOLOGY

## 2022-03-25 PROCEDURE — 77063 BREAST TOMOSYNTHESIS BI: CPT | Mod: TC

## 2022-03-25 PROCEDURE — 77067 MAMMO DIGITAL SCREENING BILAT WITH TOMO: ICD-10-PCS | Mod: 26,,, | Performed by: RADIOLOGY

## 2022-03-25 PROCEDURE — 77063 MAMMO DIGITAL SCREENING BILAT WITH TOMO: ICD-10-PCS | Mod: 26,,, | Performed by: RADIOLOGY

## 2022-03-25 NOTE — TELEPHONE ENCOUNTER
----- Message from Loly Wolff sent at 3/25/2022 10:57 AM CDT -----  Pt is calling stated that her blood vessels in the eyes keep bursting. She stated that there is pain involved. She stated OS john paul last night and pain associated with it. The OD has blood vessels bursted as well. Please contact patient to schedule at [phone]    ##Message is sent to Triage and Dr. Saavedra

## 2022-04-05 ENCOUNTER — PATIENT MESSAGE (OUTPATIENT)
Dept: NEUROLOGY | Facility: CLINIC | Age: 52
End: 2022-04-05
Payer: COMMERCIAL

## 2022-04-28 ENCOUNTER — PROCEDURE VISIT (OUTPATIENT)
Dept: NEUROLOGY | Facility: CLINIC | Age: 52
End: 2022-04-28
Payer: COMMERCIAL

## 2022-04-28 VITALS
WEIGHT: 178.88 LBS | BODY MASS INDEX: 30.54 KG/M2 | HEART RATE: 71 BPM | DIASTOLIC BLOOD PRESSURE: 94 MMHG | HEIGHT: 64 IN | SYSTOLIC BLOOD PRESSURE: 151 MMHG

## 2022-04-28 DIAGNOSIS — G43.019 INTRACTABLE MIGRAINE WITHOUT AURA AND WITHOUT STATUS MIGRAINOSUS: Primary | ICD-10-CM

## 2022-04-28 PROCEDURE — 99499 UNLISTED E&M SERVICE: CPT | Mod: S$GLB,,, | Performed by: PSYCHIATRY & NEUROLOGY

## 2022-04-28 PROCEDURE — 64615 PR CHEMODENERVATION OF MUSCLE FOR CHRONIC MIGRAINE: ICD-10-PCS | Mod: S$GLB,,, | Performed by: PSYCHIATRY & NEUROLOGY

## 2022-04-28 PROCEDURE — 64615 CHEMODENERV MUSC MIGRAINE: CPT | Mod: S$GLB,,, | Performed by: PSYCHIATRY & NEUROLOGY

## 2022-04-28 PROCEDURE — 99499 NO LOS: ICD-10-PCS | Mod: S$GLB,,, | Performed by: PSYCHIATRY & NEUROLOGY

## 2022-04-28 NOTE — PROCEDURES
Follow up:   Overall doing much better   Feels BOTOX wore off 1 week ago     Prior note:   Overall unchanged   Still has nearly daily HAs  Ubrelvy  is effective      Prior note:   S/p PT, dry needling (SCM, occipitalis, trap) - very effective (but lasts only few days)   Clenched teeth - saw dentist, no solutions   HA are now 3/week. Also has days of CDH. Imitrex helps       Prior note:   Base of skull pain at early AM hours - radiates to front + dizziness      PT with dry needling helps when she has clusters of headaches   Stress is a trigger   Still doing yoga, meditation   Using mouth guard      Also reports throat 'spasms'. No benefit from cingulair      Taking Imitrex nearly daily      3/4/20 e-mail: Hello! First, I want to report that I'm still making amazing progress in reducing and almost eliminating my migraines! Physical therapy and stress reduction have been keys. I was recently able to add everyday exercise back into my routine, with few headaches afterward. This is miraculous! I have gone through all of my prescribed PT sessions, and I don't feel I need to continue on a regular basis. However, in order to continue to be physically active, both with at-home PT exercises and regular fitness (running, walking, yoga), it would be helpful to have PT every 2-3 weeks for awhile, as my muscles continue to adjust to better posture and more effective functioning. Is it possible to prescribe additional PT at less frequency? Thank you so much!     Prior note:   Stress -> Neck pain -> HA + nausea   overactivation of trap      Prior note:   Oct 2014 - diag with Hemicran cont (L) - pain points on the L side + photo/phono  Indomethacin 25mg PRN - helped   Currently 4/week Hz      Headache history:   Age of onset -  Childhood   Location - R or L   Nature of pain -  Throbbing   Prodrome - no   Aura -  No   Duration of headache - > 4 hrs   Time to peak intensity - hrs   Pain scale - range of intensity -  8/10  Frequency  -  2-3/month   Status Migrainosus history - yes   Time of day of most headaches- anytime      Associated symptoms with the headache:   Meningeal symptoms - photophobia, phonophobia, exercise intolerance +   Nausea/vomitting+   Nasal drainage   Visual blurriness   Pallor/flushing  Dizziness +   Vertigo  Confusion  Impaired concentration +   Pain worsened with physical activity +   Neck pain   Cheek pain on R +      Cluster headache symptoms: none      Symptoms of increased intracranial pressure: none      Basilar migraine symptoms: none      Headache Triggers:   Stress +   Bright or flickering light  Strong smell  Vigorous exercise  Dietary factors   Visual strain   Weather changes +   Exertion  Heat (hot weather, hot baths or showers)  Menses     Other comorbid conditions:  Anxiety - yes   Motion sickness symptom - no      Treatment history:  Resolution of headache with sleep - yes   Meds tried:  H/o Renal stones   Fioricet, relpax, xanax   Flexeril   Medrol dose   maxalt - sluggish   Antiviral - not help   accupressure - helped   accupunture - helped   PT - helps   estradiol 0.05 mg/24 hr td ptsw (VIVELLE-DOT) 0.05 mg/24 hr  - twice weekly patch - helps with hot flashes, now on daily pill   Topamax 25 mg - not help   Gabapentin 100 mg  - drowsy, not help    mobic - helped   BOTOX#1 1/31/22 - dramatic improvement in HA      Headache risk factors:   H/o TBI  - no   H/o Meningitis  - no   F/h Aneurysms  - no     Headache burden:   In the last three months:  Days missed from work = several   Days unable to perform activities of daily living = 0  Days unable to attend social activities = several       Review of Systems   Constitutional: Negative.    HENT: Negative.    Eyes: Negative.    Respiratory: Negative.    Cardiovascular: Negative.    Gastrointestinal: Negative.    Endocrine: Negative.    Genitourinary: Negative.    Musculoskeletal: neck pain.    Skin: Negative.    Allergic/Immunologic: Negative.     Neurological: Negative.    Hematological: Negative.    Psychiatric/Behavioral: anxiety.        Objective:   Physical Exam   Constitutional: She is oriented to person, place, and time. She appears well-developed and well-nourished.   Psychiatric: She has a normal mood and affect. Her behavior is normal. Judgment and thought content normal.       Assessment:       1. Intractable chronic migraine without aura   2. Myofacial pain (pressure points)- masseter, neck   3. Tonsillar hernia into foramen magnum      Her HA are unlikely from a TAC and more likely from her tonsillar herniation and posture      Neck and upper chest - vibrating sensation      MRI C spine  Impression       No focal disk abnormalities or significant central canal or neural foraminal stenosis.  ______________________________________     Electronically signed by resident: ELIUD LEVY MD  Date: 11/07/14  Time: 10:51      MRI brain  Impression        Approximate 3 mm of cerebellar tonsillar ectopia.    2-3 punctate foci of T2/flair signal hyperintensity frontal subcortical white matter which are nonspecific and of uncertain clinical significance.    Otherwise unremarkable MRI brain without evidence for acute infarction or enhancing lesion.    Electronically signed by: TRISH GOFF DO  Date: 10/24/14  Time: 14:27       Degenerative findings:   C2-C3: No spinal canal stenosis or neural foraminal narrowing.   C3-C4: Posterior disc osteophyte complex and left facet joint arthropathy resulting in mild left-sided neural foraminal narrowing.  No spinal canal stenosis.   C4-C5: Posterior disc osteophyte complex with no significant spinal canal stenosis or neural foraminal narrowing.   C5-C6: Posterior disc osteophyte complex with mild spinal canal stenosis.  No neural foraminal narrowing.   C6-C7: Posterior disc osteophyte complex with no significant spinal canal stenosis or neural foraminal narrowing.   C7-T1: No spinal canal stenosis or neural foraminal  narrowing.  Redemonstration perineural cysts within the proximal extraforaminal space bilaterally at this level which possibly compressing on the exiting nerve roots.   Paraspinal muscles & soft tissues: Unremarkable.     Impression:   1. Redemonstration of perineural cysts bilaterally at the level of C7-T1, as above.  2. No significant spinal canal stenosis or neural foraminal narrowing throughout the cervical spine, as above.     Electronically signed by resident: Macho Boone  Date:                                            08/20/2020  Time:                                           09:29     Electronically signed by: Mauricio Pitt MD  Date:                                            08/20/2020  Time:                                           09:55      Results for LAISHA HUMPHREY (MRN [id number]) as of 10/11/2019 09:44    Ref. Range 2/5/2019 10:19   Vitamin B-12 Latest Ref Range: 210 - 950 pg/mL >2000 (H)   Thiamine Latest Ref Range: 38 - 122 ug/L 45   Vit D, 25-Hydroxy Latest Ref Range: 30 - 96 ng/mL 75   Vitamin B2 Latest Ref Range: 1 - 19 mcg/L 18   Vitamin B6 Latest Ref Range: 5 - 50 ug/L 131 (H)   Antigliadin Ab IgG Latest Ref Range: <20 UNITS 2   Antigliadin Abs, IgA Latest Ref Range: <20 UNITS 7   TTG IgA Latest Ref Range: <20 UNITS 9   TTG IgG Latest Ref Range: <20 UNITS 3   Immunoglobulin A (IgA) Latest Ref Range: 70 - 400 mg/dL 172      Plan:   cont estrogen, cingulair   Breakthrough headache - imitrex 100 mg, xanax, mobic, ubrelvy, pain cream   Dentist - Floyd Erickson ([facility] [place])   Recheck BP     BOTOX was performed as an indicated therapy for intractable chronic migraine headaches given that the patient failed > 2 prophylactic medications     Botulinum Toxin Injection Procedure   Pre-operative diagnosis: Chronic migraine   Post-operative diagnosis: Same   Procedure: Chemical neurolysis   After risks and benefits were explained including bleeding, infection, worsening of pain, damage to the  areas being injected, weakness of muscles, loss of muscle control, dysphagia if injecting the head or neck, facial droop if injecting the facial area, painful injection, allergic or other reaction to the medications being injected, and the failure of the procedure to help the problem, a signed consent was obtained.   The patient was placed in a comfortable area and the sites to be treated were identified.The area to be treated was prepped three times with alcohol and the alcohol allowed to dry. Next, a 30 gauge needle was used to inject the medication in the area to be treated.   Area(s) injected:   Total Botox used: 175 Units   Botox wastage: 25 Units   Injection sites:    muscle bilaterally ( a total of 10 units divided into 2 sites)   Procerus muscle (5 units)   Frontalis muscle bilaterally (a total of 20 units divided into 4 sites)   Temporalis muscle bilaterally (a total of 40 units divided into 8 sites)   Occipitalis muscle bilaterally (a total of 30 units divided into 6 sites)   Cervical paraspinal muscles (a total of 20 units divided into 4 sites)   Trapezius muscle bilaterally (a total of 30 units divided into 6 sites)  Masseter 5 units bel    Mastoid 5 units   Complications: none   RTC for the next Botox injection: 3 months            Patient verbalized understanding to plan. I answered all her questions to her satisfaction

## 2022-05-17 NOTE — TELEPHONE ENCOUNTER
Refill Routing Note   Medication(s) are not appropriate for processing by [facility] for the following reason(s):      - Patient has not been seen in over 15 months by PCP    [facility] action(s):  Defer          Medication reconciliation completed: No     Appointments  past 12m or future 3m with PCP    Date Provider   Last Visit   11/10/2020 Any Krueger MD   Next Visit   Visit date not found Any Krueger MD   ED visits in past 90 days: 0        Note composed:2:33 PM 05/17/2022

## 2022-05-17 NOTE — TELEPHONE ENCOUNTER
----- Message from Zeny Rodriguez sent at 5/17/2022 11:42 AM CDT -----  Contact: Self [phone]  Requesting an RX refill or new RX.    Is this a refill or new RX: refill    RX name and strength (copy/paste from chart):  montelukast (SINGULAIR) 10 mg tablet    Is this a 30 day or 90 day RX: 90    Pharmacy name and phone # (copy/paste from chart):     [id number] [address]  [address]  [place] [place] [place] [address]  Phone: [phone] Fax: [phone]    Pt states she is out of medications and requesting a response via [employer].

## 2022-05-18 RX ORDER — MONTELUKAST SODIUM 10 MG/1
10 TABLET ORAL DAILY
Qty: 90 TABLET | Refills: 0 | OUTPATIENT
Start: 2022-05-18

## 2022-05-23 RX ORDER — MONTELUKAST SODIUM 10 MG/1
10 TABLET ORAL DAILY
Qty: 90 TABLET | Refills: 0 | Status: SHIPPED | OUTPATIENT
Start: 2022-05-23 | End: 2022-08-19

## 2022-05-23 NOTE — TELEPHONE ENCOUNTER
Pt was schd to see Dr Soto   She booked out today  She was transf care to Dr Soto from Dr Yuan   Pt just needs a refill on singulair   Pend the medication

## 2022-05-23 NOTE — TELEPHONE ENCOUNTER
No new care gaps identified.  [facility] Embedded Care Gaps. Reference number: [id number]. 5/23/2022   8:26:10 AM CDT

## 2022-06-01 ENCOUNTER — OFFICE VISIT (OUTPATIENT)
Dept: INTERNAL MEDICINE | Facility: CLINIC | Age: 52
End: 2022-06-01
Payer: COMMERCIAL

## 2022-06-01 ENCOUNTER — LAB VISIT (OUTPATIENT)
Dept: LAB | Facility: HOSPITAL | Age: 52
End: 2022-06-01
Payer: COMMERCIAL

## 2022-06-01 VITALS
HEART RATE: 67 BPM | WEIGHT: 174 LBS | DIASTOLIC BLOOD PRESSURE: 100 MMHG | BODY MASS INDEX: 29.87 KG/M2 | OXYGEN SATURATION: 97 % | SYSTOLIC BLOOD PRESSURE: 140 MMHG

## 2022-06-01 DIAGNOSIS — M79.622 AXILLARY PAIN, LEFT: ICD-10-CM

## 2022-06-01 DIAGNOSIS — Z76.89 ENCOUNTER TO ESTABLISH CARE WITH NEW DOCTOR: ICD-10-CM

## 2022-06-01 DIAGNOSIS — E04.1 THYROID NODULE: ICD-10-CM

## 2022-06-01 DIAGNOSIS — G43.019 INTRACTABLE MIGRAINE WITHOUT AURA AND WITHOUT STATUS MIGRAINOSUS: ICD-10-CM

## 2022-06-01 DIAGNOSIS — Z71.89 COUNSELING FOR ESTROGEN REPLACEMENT THERAPY: ICD-10-CM

## 2022-06-01 DIAGNOSIS — M26.609 TMJ DISEASE: ICD-10-CM

## 2022-06-01 DIAGNOSIS — F41.9 ANXIETY: ICD-10-CM

## 2022-06-01 DIAGNOSIS — R03.0 ELEVATED BLOOD-PRESSURE READING WITHOUT DIAGNOSIS OF HYPERTENSION: ICD-10-CM

## 2022-06-01 DIAGNOSIS — Z76.89 ENCOUNTER TO ESTABLISH CARE WITH NEW DOCTOR: Primary | ICD-10-CM

## 2022-06-01 LAB
ALBUMIN SERPL BCP-MCNC: 4 G/DL (ref 3.5–5.2)
ALP SERPL-CCNC: 77 U/L (ref 55–135)
ALT SERPL W/O P-5'-P-CCNC: 26 U/L (ref 10–44)
ANION GAP SERPL CALC-SCNC: 8 MMOL/L (ref 8–16)
AST SERPL-CCNC: 31 U/L (ref 10–40)
BASOPHILS # BLD AUTO: 0.02 K/UL (ref 0–0.2)
BASOPHILS NFR BLD: 0.4 % (ref 0–1.9)
BILIRUB SERPL-MCNC: 0.5 MG/DL (ref 0.1–1)
BUN SERPL-MCNC: 14 MG/DL (ref 6–20)
CALCIUM SERPL-MCNC: 9.3 MG/DL (ref 8.7–10.5)
CHLORIDE SERPL-SCNC: 104 MMOL/L (ref 95–110)
CHOLEST SERPL-MCNC: 240 MG/DL (ref 120–199)
CHOLEST/HDLC SERPL: 2.6 {RATIO} (ref 2–5)
CO2 SERPL-SCNC: 27 MMOL/L (ref 23–29)
CREAT SERPL-MCNC: 0.7 MG/DL (ref 0.5–1.4)
DIFFERENTIAL METHOD: NORMAL
EOSINOPHIL # BLD AUTO: 0.1 K/UL (ref 0–0.5)
EOSINOPHIL NFR BLD: 1.2 % (ref 0–8)
ERYTHROCYTE [DISTWIDTH] IN BLOOD BY AUTOMATED COUNT: 12.7 % (ref 11.5–14.5)
EST. GFR  (AFRICAN AMERICAN): >60 ML/MIN/1.73 M^2
EST. GFR  (NON AFRICAN AMERICAN): >60 ML/MIN/1.73 M^2
ESTIMATED AVG GLUCOSE: 94 MG/DL (ref 68–131)
GLUCOSE SERPL-MCNC: 90 MG/DL (ref 70–110)
HBA1C MFR BLD: 4.9 % (ref 4–5.6)
HCT VFR BLD AUTO: 42.1 % (ref 37–48.5)
HDLC SERPL-MCNC: 93 MG/DL (ref 40–75)
HDLC SERPL: 38.8 % (ref 20–50)
HGB BLD-MCNC: 13.9 G/DL (ref 12–16)
IMM GRANULOCYTES # BLD AUTO: 0.01 K/UL (ref 0–0.04)
IMM GRANULOCYTES NFR BLD AUTO: 0.2 % (ref 0–0.5)
LDLC SERPL CALC-MCNC: 137 MG/DL (ref 63–159)
LYMPHOCYTES # BLD AUTO: 2.1 K/UL (ref 1–4.8)
LYMPHOCYTES NFR BLD: 43.2 % (ref 18–48)
MCH RBC QN AUTO: 30.8 PG (ref 27–31)
MCHC RBC AUTO-ENTMCNC: 33 G/DL (ref 32–36)
MCV RBC AUTO: 93 FL (ref 82–98)
MONOCYTES # BLD AUTO: 0.3 K/UL (ref 0.3–1)
MONOCYTES NFR BLD: 6.3 % (ref 4–15)
NEUTROPHILS # BLD AUTO: 2.4 K/UL (ref 1.8–7.7)
NEUTROPHILS NFR BLD: 48.7 % (ref 38–73)
NONHDLC SERPL-MCNC: 147 MG/DL
NRBC BLD-RTO: 0 /100 WBC
PLATELET # BLD AUTO: 176 K/UL (ref 150–450)
PMV BLD AUTO: 11.1 FL (ref 9.2–12.9)
POTASSIUM SERPL-SCNC: 3.7 MMOL/L (ref 3.5–5.1)
PROT SERPL-MCNC: 7.2 G/DL (ref 6–8.4)
RBC # BLD AUTO: 4.51 M/UL (ref 4–5.4)
SODIUM SERPL-SCNC: 139 MMOL/L (ref 136–145)
TRIGL SERPL-MCNC: 50 MG/DL (ref 30–150)
TSH SERPL DL<=0.005 MIU/L-ACNC: 2.46 UIU/ML (ref 0.4–4)
WBC # BLD AUTO: 4.91 K/UL (ref 3.9–12.7)

## 2022-06-01 PROCEDURE — 3008F PR BODY MASS INDEX (BMI) DOCUMENTED: ICD-10-PCS | Mod: CPTII,S$GLB,, | Performed by: INTERNAL MEDICINE

## 2022-06-01 PROCEDURE — 3077F SYST BP >= 140 MM HG: CPT | Mod: CPTII,S$GLB,, | Performed by: INTERNAL MEDICINE

## 2022-06-01 PROCEDURE — 3008F BODY MASS INDEX DOCD: CPT | Mod: CPTII,S$GLB,, | Performed by: INTERNAL MEDICINE

## 2022-06-01 PROCEDURE — 1159F PR MEDICATION LIST DOCUMENTED IN MEDICAL RECORD: ICD-10-PCS | Mod: CPTII,S$GLB,, | Performed by: INTERNAL MEDICINE

## 2022-06-01 PROCEDURE — 84443 ASSAY THYROID STIM HORMONE: CPT | Performed by: INTERNAL MEDICINE

## 2022-06-01 PROCEDURE — 1160F RVW MEDS BY RX/DR IN RCRD: CPT | Mod: CPTII,S$GLB,, | Performed by: INTERNAL MEDICINE

## 2022-06-01 PROCEDURE — 83036 HEMOGLOBIN GLYCOSYLATED A1C: CPT | Performed by: INTERNAL MEDICINE

## 2022-06-01 PROCEDURE — 3077F PR MOST RECENT SYSTOLIC BLOOD PRESSURE >= 140 MM HG: ICD-10-PCS | Mod: CPTII,S$GLB,, | Performed by: INTERNAL MEDICINE

## 2022-06-01 PROCEDURE — 99214 OFFICE O/P EST MOD 30 MIN: CPT | Mod: S$GLB,,, | Performed by: INTERNAL MEDICINE

## 2022-06-01 PROCEDURE — 99999 PR PBB SHADOW E&M-EST. PATIENT-LVL V: CPT | Mod: PBBFAC,,, | Performed by: INTERNAL MEDICINE

## 2022-06-01 PROCEDURE — 80053 COMPREHEN METABOLIC PANEL: CPT | Performed by: INTERNAL MEDICINE

## 2022-06-01 PROCEDURE — 99214 PR OFFICE/OUTPT VISIT, EST, LEVL IV, 30-39 MIN: ICD-10-PCS | Mod: S$GLB,,, | Performed by: INTERNAL MEDICINE

## 2022-06-01 PROCEDURE — 1160F PR REVIEW ALL MEDS BY PRESCRIBER/CLIN PHARMACIST DOCUMENTED: ICD-10-PCS | Mod: CPTII,S$GLB,, | Performed by: INTERNAL MEDICINE

## 2022-06-01 PROCEDURE — 80061 LIPID PANEL: CPT | Performed by: INTERNAL MEDICINE

## 2022-06-01 PROCEDURE — 1159F MED LIST DOCD IN RCRD: CPT | Mod: CPTII,S$GLB,, | Performed by: INTERNAL MEDICINE

## 2022-06-01 PROCEDURE — 36415 COLL VENOUS BLD VENIPUNCTURE: CPT | Performed by: INTERNAL MEDICINE

## 2022-06-01 PROCEDURE — 85025 COMPLETE CBC W/AUTO DIFF WBC: CPT | Performed by: INTERNAL MEDICINE

## 2022-06-01 PROCEDURE — 99999 PR PBB SHADOW E&M-EST. PATIENT-LVL V: ICD-10-PCS | Mod: PBBFAC,,, | Performed by: INTERNAL MEDICINE

## 2022-06-01 PROCEDURE — 3080F DIAST BP >= 90 MM HG: CPT | Mod: CPTII,S$GLB,, | Performed by: INTERNAL MEDICINE

## 2022-06-01 PROCEDURE — 3080F PR MOST RECENT DIASTOLIC BLOOD PRESSURE >= 90 MM HG: ICD-10-PCS | Mod: CPTII,S$GLB,, | Performed by: INTERNAL MEDICINE

## 2022-06-01 RX ORDER — OLOPATADINE HYDROCHLORIDE 1 MG/ML
1 SOLUTION/ DROPS OPHTHALMIC DAILY PRN
COMMUNITY
End: 2023-10-06

## 2022-06-01 NOTE — PATIENT INSTRUCTIONS
Start checking BP at home, in AM and evening. Keep a log and send through portal in 3 weeks.     Schedule your ultrasound.

## 2022-06-01 NOTE — PROGRESS NOTES
INTERNAL MEDICINE INITIAL VISIT NOTE      CHIEF COMPLAINT     Chief Complaint   Patient presents with    Establish Care    Neck Pain     Been going on for over an year       HPI     Cheryle Olivares is a 52 y.o. female with migraines, tonsillar hernia, HRT, thyroid nodule, here today to establish care. Transferring care from Dr. Krueger.     Occasionally takes Xanax for anxiety, 1/2 pill as needed. Reports rare use.    Reports 'catch in throat.' Referred for US and ENT by previous PCP; work-up benign. ST subsequently recommended, deferred as undergoing other PT at that time. Resolved, now has returned and increased in frequency over past week. Associated tingling of L jaw.    Notably, had botox injection for migraine prevention on 4/28 in L jaw. Known diagnosis of TMJ, worsens with stress - prior treatments include mouth guard, PT, dry needling. Somewhat helpful. Unable to tolerate PO muscle relaxants due to side effects. Neurologist prescribed muscle relaxant cream previously, would like to re-try. No trial of counseling.     Concerned about possible enlarged lymph node of L armpit. Requesting evaluation.     Past Medical History:  Past Medical History:   Diagnosis Date    Allergy     Anemia     History of migraine headaches     Migraine headache     Morbid obesity     Preeclampsia, severe 7/15/2013       Review of Systems:  Review of Systems   Constitutional: Negative for chills and fever.   HENT: Negative for congestion.    Respiratory: Negative for cough and shortness of breath.    Cardiovascular: Negative for chest pain.   Gastrointestinal: Negative for constipation, nausea and vomiting.   Genitourinary: Negative for hematuria and urgency.   Musculoskeletal: Positive for neck pain. Negative for falls.   Skin: Negative for rash.   Neurological: Negative for dizziness and loss of consciousness.       Health Maintenance:   Immunizations:   Influenza - fall 2022  Tdap - 4/2013  Covid 19 -  complete  HPV  Prevnar rec at 66  Shingrix rec at 51 - 2nd dose    Cancer Screening:  PAP: 1/2015 NILM  Mammogram:  3/2022 BIRAD 1  Colonoscopy:  12/2021 diverticulosis; repeat 12/2026  DEXA:  n/a      PHYSICAL EXAM     BP (!) 140/100 (BP Location: Left arm, Patient Position: Sitting)   Pulse 67   Wt 78.9 kg (174 lb)   LMP 11/27/2015 (Exact Date)   SpO2 97%   BMI 29.87 kg/m²     GEN - A+OX4, NAD   HEENT - PERRL, EOMI,   Neck - No thyromegaly or thyroid masses felt.  No cervical lymphadenopathy appreciated.  CV - RRR, no m/r/g  Chest - CTAB, no wheezing, crackles, or rhonchi. No discrete lymphadenopathy appreciated of bilateral axillas.  Abd - S/NT/ND/+BS.   Ext - 2+BDP. No C/C/E.  Skin - Normal color and texture, no rash, no skin lesions.  MSK - Cervical flexion/extension/rotation intact.     ASSESSMENT/PLAN     Cheryle Olivares is a 52 y.o. female with conditions as above.     Encounter to establish care with new doctor  -     CBC Auto Differential; Future; Expected date: 06/01/2022  -     Comprehensive Metabolic Panel; Future; Expected date: 06/01/2022  -     Hemoglobin A1C; Future; Expected date: 06/01/2022  -     Lipid Panel; Future; Expected date: 06/01/2022  -     TSH; Future; Expected date: 06/01/2022    Anxiety  Acceptable, occasional use of Xanax PRN  Discussed behavioral counseling, amenable; to find OSH /counselor    Intractable migraine without aura and without status migrainosus  Follows with Neurology    estrogen replacement therapy    Thyroid nodule: 7/21  US Thyroid 7/2021 - Solitary subcentimeter nodule not meeting TI-RADS criteria for FNA or further follow-up    TMJ disease  Suspect constellation of symptoms including paresthesias, anterior neck discomfort TMJ related  Discussed PO muscle relaxants, deferred; to discuss topical treatment with Neurology  Encouraged home exercises and behavioral counseling given evidence of improvement     Elevated blood-pressure reading without  diagnosis of hypertension  Elevated initially and on repeat  Advised to check BP at home, send log through portal in 3 weeks  Continue exercise/low Na diet    Axillary pain, left  -     US Soft Tissue Axilla, Left; Future; Expected date: 06/01/2022    HM as above    RTC in 6 mo, sooner if needed and depending on labs.    Virginie Soto MD  Department of Internal Medicine - [facility]  06/01/2022

## 2022-06-02 ENCOUNTER — HOSPITAL ENCOUNTER (OUTPATIENT)
Dept: RADIOLOGY | Facility: OTHER | Age: 52
Discharge: HOME | End: 2022-06-02
Attending: INTERNAL MEDICINE
Payer: COMMERCIAL

## 2022-06-02 ENCOUNTER — PATIENT MESSAGE (OUTPATIENT)
Dept: INTERNAL MEDICINE | Facility: CLINIC | Age: 52
End: 2022-06-02
Payer: COMMERCIAL

## 2022-06-02 DIAGNOSIS — M79.622 AXILLARY PAIN, LEFT: ICD-10-CM

## 2022-06-02 DIAGNOSIS — F41.9 ANXIETY: Primary | ICD-10-CM

## 2022-06-02 PROCEDURE — 76882 US LMTD JT/FCL EVL NVASC XTR: CPT | Mod: TC,LT

## 2022-06-02 PROCEDURE — 76882 US SOFT TISSUE AXILLA, LEFT: ICD-10-PCS | Mod: 26,LT,, | Performed by: RADIOLOGY

## 2022-06-02 PROCEDURE — 76882 US LMTD JT/FCL EVL NVASC XTR: CPT | Mod: 26,LT,, | Performed by: RADIOLOGY

## 2022-06-07 ENCOUNTER — TELEPHONE (OUTPATIENT)
Dept: INTERNAL MEDICINE | Facility: CLINIC | Age: 52
End: 2022-06-07
Payer: COMMERCIAL

## 2022-06-07 NOTE — TELEPHONE ENCOUNTER
Notified pt of message from PCP to call Behavioral Health @ [phone] to schedule an appointment.  I left a message for [facility] staff explaining pt needs scheduling.

## 2022-06-10 ENCOUNTER — TELEPHONE (OUTPATIENT)
Dept: BEHAVIORAL HEALTH | Facility: CLINIC | Age: 52
End: 2022-06-10
Payer: COMMERCIAL

## 2022-06-14 ENCOUNTER — PATIENT MESSAGE (OUTPATIENT)
Dept: INTERNAL MEDICINE | Facility: CLINIC | Age: 52
End: 2022-06-14
Payer: COMMERCIAL

## 2022-06-18 ENCOUNTER — IMMUNIZATION (OUTPATIENT)
Dept: PRIMARY CARE CLINIC | Facility: CLINIC | Age: 52
End: 2022-06-18
Payer: COMMERCIAL

## 2022-06-18 DIAGNOSIS — Z23 NEED FOR VACCINATION: Primary | ICD-10-CM

## 2022-06-18 PROCEDURE — 0064A COVID-19, MRNA, LNP-S, PF, 100 MCG/0.25 ML DOSE VACCINE (MODERNA BOOSTER): CPT | Mod: CV19,PBBFAC | Performed by: INTERNAL MEDICINE

## 2022-06-20 ENCOUNTER — TELEPHONE (OUTPATIENT)
Dept: BEHAVIORAL HEALTH | Facility: CLINIC | Age: 52
End: 2022-06-20
Payer: COMMERCIAL

## 2022-06-20 NOTE — PROGRESS NOTES
CHW spoke with Cheryle Olivares in regard to the [facility] program, Pt would like for us to follow up with her in a week

## 2022-07-18 ENCOUNTER — TELEPHONE (OUTPATIENT)
Dept: BEHAVIORAL HEALTH | Facility: CLINIC | Age: 52
End: 2022-07-18
Payer: COMMERCIAL

## 2022-07-18 NOTE — PROGRESS NOTES
Patient was referred to the [facility] Non Opioid program by PCP.  CHW tired to reach out to patient a total of three times.  Patient referral was removed from the [facility] program.  CHW sent follow up message to patient's PCP via [employer].

## 2022-07-26 ENCOUNTER — PATIENT MESSAGE (OUTPATIENT)
Dept: NEUROLOGY | Facility: CLINIC | Age: 52
End: 2022-07-26
Payer: COMMERCIAL

## 2022-07-26 ENCOUNTER — PATIENT MESSAGE (OUTPATIENT)
Dept: INTERNAL MEDICINE | Facility: CLINIC | Age: 52
End: 2022-07-26
Payer: COMMERCIAL

## 2022-07-31 ENCOUNTER — PATIENT MESSAGE (OUTPATIENT)
Dept: OBSTETRICS AND GYNECOLOGY | Facility: CLINIC | Age: 52
End: 2022-07-31
Payer: COMMERCIAL

## 2022-08-04 ENCOUNTER — PROCEDURE VISIT (OUTPATIENT)
Dept: NEUROLOGY | Facility: CLINIC | Age: 52
End: 2022-08-04
Payer: COMMERCIAL

## 2022-08-04 VITALS
HEART RATE: 72 BPM | HEIGHT: 64 IN | BODY MASS INDEX: 29.55 KG/M2 | DIASTOLIC BLOOD PRESSURE: 95 MMHG | WEIGHT: 173.06 LBS | SYSTOLIC BLOOD PRESSURE: 135 MMHG

## 2022-08-04 DIAGNOSIS — G43.719 INTRACTABLE CHRONIC MIGRAINE WITHOUT AURA AND WITHOUT STATUS MIGRAINOSUS: Primary | ICD-10-CM

## 2022-08-04 PROCEDURE — 99499 UNLISTED E&M SERVICE: CPT | Mod: S$GLB,,, | Performed by: PSYCHIATRY & NEUROLOGY

## 2022-08-04 PROCEDURE — 64615 PR CHEMODENERVATION OF MUSCLE FOR CHRONIC MIGRAINE: ICD-10-PCS | Mod: S$GLB,,, | Performed by: PSYCHIATRY & NEUROLOGY

## 2022-08-04 PROCEDURE — 99499 NO LOS: ICD-10-PCS | Mod: S$GLB,,, | Performed by: PSYCHIATRY & NEUROLOGY

## 2022-08-04 PROCEDURE — 64615 CHEMODENERV MUSC MIGRAINE: CPT | Mod: S$GLB,,, | Performed by: PSYCHIATRY & NEUROLOGY

## 2022-08-04 NOTE — PROCEDURES
Follow up:   Now has severe migraine 2/month   ubrelvy - partial relief     Prior note:   Overall doing much better   Feels BOTOX wore off 1 week ago     Prior note:   Overall unchanged   Still has nearly daily HAs  Ubrelvy  is effective      Prior note:   S/p PT, dry needling (SCM, occipitalis, trap) - very effective (but lasts only few days)   Clenched teeth - saw dentist, no solutions   HA are now 3/week. Also has days of CDH. Imitrex helps       Prior note:   Base of skull pain at early AM hours - radiates to front + dizziness      PT with dry needling helps when she has clusters of headaches   Stress is a trigger   Still doing yoga, meditation   Using mouth guard      Also reports throat 'spasms'. No benefit from cingulair      Taking Imitrex nearly daily      3/4/20 e-mail: Hello! First, I want to report that I'm still making amazing progress in reducing and almost eliminating my migraines! Physical therapy and stress reduction have been keys. I was recently able to add everyday exercise back into my routine, with few headaches afterward. This is miraculous! I have gone through all of my prescribed PT sessions, and I don't feel I need to continue on a regular basis. However, in order to continue to be physically active, both with at-home PT exercises and regular fitness (running, walking, yoga), it would be helpful to have PT every 2-3 weeks for awhile, as my muscles continue to adjust to better posture and more effective functioning. Is it possible to prescribe additional PT at less frequency? Thank you so much!     Prior note:   Stress -> Neck pain -> HA + nausea   overactivation of trap      Prior note:   Oct 2014 - diag with Hemicran cont (L) - pain points on the L side + photo/phono  Indomethacin 25mg PRN - helped   Currently 4/week Hz      Headache history:   Age of onset -  Childhood   Location - R or L   Nature of pain -  Throbbing   Prodrome - no   Aura -  No   Duration of headache - > 4 hrs   Time to  peak intensity - hrs   Pain scale - range of intensity -  8/10  Frequency -  2-3/month   Status Migrainosus history - yes   Time of day of most headaches- anytime      Associated symptoms with the headache:   Meningeal symptoms - photophobia, phonophobia, exercise intolerance +   Nausea/vomitting+   Nasal drainage   Visual blurriness   Pallor/flushing  Dizziness +   Vertigo  Confusion  Impaired concentration +   Pain worsened with physical activity +   Neck pain   Cheek pain on R +      Cluster headache symptoms: none      Symptoms of increased intracranial pressure: none      Basilar migraine symptoms: none      Headache Triggers:   Stress +   Bright or flickering light  Strong smell  Vigorous exercise  Dietary factors   Visual strain   Weather changes +   Exertion  Heat (hot weather, hot baths or showers)  Menses     Other comorbid conditions:  Anxiety - yes   Motion sickness symptom - no      Treatment history:  Resolution of headache with sleep - yes   Meds tried:  H/o Renal stones   Fioricet, relpax, xanax   Flexeril   Medrol dose   maxalt - sluggish   Antiviral - not help   accupressure - helped   accupunture - helped   PT - helps   estradiol 0.05 mg/24 hr td ptsw (VIVELLE-DOT) 0.05 mg/24 hr  - twice weekly patch - helps with hot flashes, now on daily pill   Topamax 25 mg - not help   Gabapentin 100 mg  - drowsy, not help    mobic - helped   BOTOX#1 1/31/22 - dramatic improvement in HA        Headache risk factors:   H/o TBI  - no   H/o Meningitis  - no   F/h Aneurysms  - no     Headache burden:   In the last three months:  Days missed from work = several   Days unable to perform activities of daily living = 0  Days unable to attend social activities = several       Review of Systems   Constitutional: Negative.    HENT: Negative.    Eyes: Negative.    Respiratory: Negative.    Cardiovascular: Negative.    Gastrointestinal: Negative.    Endocrine: Negative.    Genitourinary: Negative.    Musculoskeletal: neck  pain.    Skin: Negative.    Allergic/Immunologic: Negative.    Neurological: Negative.    Hematological: Negative.    Psychiatric/Behavioral: anxiety.        Objective:   Physical Exam   Constitutional: She is oriented to person, place, and time. She appears well-developed and well-nourished.   Psychiatric: She has a normal mood and affect. Her behavior is normal. Judgment and thought content normal.       Assessment:       1. Intractable chronic migraine without aura   2. Myofacial pain (pressure points)- masseter, neck   3. Tonsillar hernia into foramen magnum      Her HA are unlikely from a TAC and more likely from her tonsillar herniation and posture      Neck and upper chest - vibrating sensation      MRI C spine  Impression       No focal disk abnormalities or significant central canal or neural foraminal stenosis.  ______________________________________     Electronically signed by resident: ELIUD LEVY MD  Date: 11/07/14  Time: 10:51      MRI brain  Impression        Approximate 3 mm of cerebellar tonsillar ectopia.    2-3 punctate foci of T2/flair signal hyperintensity frontal subcortical white matter which are nonspecific and of uncertain clinical significance.    Otherwise unremarkable MRI brain without evidence for acute infarction or enhancing lesion.    Electronically signed by: TRISH GOFF DO  Date: 10/24/14  Time: 14:27       Degenerative findings:   C2-C3: No spinal canal stenosis or neural foraminal narrowing.   C3-C4: Posterior disc osteophyte complex and left facet joint arthropathy resulting in mild left-sided neural foraminal narrowing.  No spinal canal stenosis.   C4-C5: Posterior disc osteophyte complex with no significant spinal canal stenosis or neural foraminal narrowing.   C5-C6: Posterior disc osteophyte complex with mild spinal canal stenosis.  No neural foraminal narrowing.   C6-C7: Posterior disc osteophyte complex with no significant spinal canal stenosis or neural foraminal  narrowing.   C7-T1: No spinal canal stenosis or neural foraminal narrowing.  Redemonstration perineural cysts within the proximal extraforaminal space bilaterally at this level which possibly compressing on the exiting nerve roots.   Paraspinal muscles & soft tissues: Unremarkable.     Impression:   1. Redemonstration of perineural cysts bilaterally at the level of C7-T1, as above.  2. No significant spinal canal stenosis or neural foraminal narrowing throughout the cervical spine, as above.     Electronically signed by resident: Macho Boone  Date:                                            08/20/2020  Time:                                           09:29     Electronically signed by: Mauricio Pitt MD  Date:                                            08/20/2020  Time:                                           09:55      Results for LAISHA HUMPHREY (MRN [id number]) as of 10/11/2019 09:44    Ref. Range 2/5/2019 10:19   Vitamin B-12 Latest Ref Range: 210 - 950 pg/mL >2000 (H)   Thiamine Latest Ref Range: 38 - 122 ug/L 45   Vit D, 25-Hydroxy Latest Ref Range: 30 - 96 ng/mL 75   Vitamin B2 Latest Ref Range: 1 - 19 mcg/L 18   Vitamin B6 Latest Ref Range: 5 - 50 ug/L 131 (H)   Antigliadin Ab IgG Latest Ref Range: <20 UNITS 2   Antigliadin Abs, IgA Latest Ref Range: <20 UNITS 7   TTG IgA Latest Ref Range: <20 UNITS 9   TTG IgG Latest Ref Range: <20 UNITS 3   Immunoglobulin A (IgA) Latest Ref Range: 70 - 400 mg/dL 172      Plan:   cont estrogen, cingulair   Breakthrough headache - imitrex 100 mg, xanax, mobic, ubrelvy, pain cream, trudhesa    Dentist - Floyd Erickson ([place])     BOTOX was performed as an indicated therapy for intractable chronic migraine headaches given that the patient failed > 2 prophylactic medications     Botulinum Toxin Injection Procedure   Pre-operative diagnosis: Chronic migraine   Post-operative diagnosis: Same   Procedure: Chemical neurolysis   After risks and benefits were explained  including bleeding, infection, worsening of pain, damage to the areas being injected, weakness of muscles, loss of muscle control, dysphagia if injecting the head or neck, facial droop if injecting the facial area, painful injection, allergic or other reaction to the medications being injected, and the failure of the procedure to help the problem, a signed consent was obtained.   The patient was placed in a comfortable area and the sites to be treated were identified.The area to be treated was prepped three times with alcohol and the alcohol allowed to dry. Next, a 30 gauge needle was used to inject the medication in the area to be treated.   Area(s) injected:   Total Botox used: 175 Units   Botox wastage: 25 Units   Injection sites:    muscle bilaterally ( a total of 10 units divided into 2 sites)   Procerus muscle (5 units)   Frontalis muscle bilaterally (a total of 20 units divided into 4 sites)   Temporalis muscle bilaterally (a total of 40 units divided into 8 sites)   Occipitalis muscle bilaterally (a total of 30 units divided into 6 sites)   Cervical paraspinal muscles (a total of 20 units divided into 4 sites)   Trapezius muscle bilaterally (a total of 30 units divided into 6 sites)  Masseter 5 units bel    Mastoid 5 units   Complications: none   RTC for the next Botox injection: 3 months          Patient verbalized understanding to plan. I answered all her questions to her satisfaction

## 2022-08-05 ENCOUNTER — PATIENT MESSAGE (OUTPATIENT)
Dept: INTERNAL MEDICINE | Facility: CLINIC | Age: 52
End: 2022-08-05
Payer: COMMERCIAL

## 2022-08-05 ENCOUNTER — OFFICE VISIT (OUTPATIENT)
Dept: INTERNAL MEDICINE | Facility: CLINIC | Age: 52
End: 2022-08-05
Payer: COMMERCIAL

## 2022-08-05 ENCOUNTER — PATIENT MESSAGE (OUTPATIENT)
Dept: INTERNAL MEDICINE | Facility: CLINIC | Age: 52
End: 2022-08-05

## 2022-08-05 DIAGNOSIS — R51.9 NONINTRACTABLE HEADACHE, UNSPECIFIED CHRONICITY PATTERN, UNSPECIFIED HEADACHE TYPE: ICD-10-CM

## 2022-08-05 DIAGNOSIS — U07.1 COVID: Primary | ICD-10-CM

## 2022-08-05 DIAGNOSIS — R09.81 SINUS CONGESTION: ICD-10-CM

## 2022-08-05 PROCEDURE — 3044F HG A1C LEVEL LT 7.0%: CPT | Mod: CPTII,95,, | Performed by: INTERNAL MEDICINE

## 2022-08-05 PROCEDURE — 99213 PR OFFICE/OUTPT VISIT, EST, LEVL III, 20-29 MIN: ICD-10-PCS | Mod: 95,,, | Performed by: INTERNAL MEDICINE

## 2022-08-05 PROCEDURE — 3044F PR MOST RECENT HEMOGLOBIN A1C LEVEL <7.0%: ICD-10-PCS | Mod: CPTII,95,, | Performed by: INTERNAL MEDICINE

## 2022-08-05 PROCEDURE — 1160F PR REVIEW ALL MEDS BY PRESCRIBER/CLIN PHARMACIST DOCUMENTED: ICD-10-PCS | Mod: CPTII,95,, | Performed by: INTERNAL MEDICINE

## 2022-08-05 PROCEDURE — 1159F PR MEDICATION LIST DOCUMENTED IN MEDICAL RECORD: ICD-10-PCS | Mod: CPTII,95,, | Performed by: INTERNAL MEDICINE

## 2022-08-05 PROCEDURE — 1159F MED LIST DOCD IN RCRD: CPT | Mod: CPTII,95,, | Performed by: INTERNAL MEDICINE

## 2022-08-05 PROCEDURE — 99213 OFFICE O/P EST LOW 20 MIN: CPT | Mod: 95,,, | Performed by: INTERNAL MEDICINE

## 2022-08-05 PROCEDURE — 1160F RVW MEDS BY RX/DR IN RCRD: CPT | Mod: CPTII,95,, | Performed by: INTERNAL MEDICINE

## 2022-08-05 RX ORDER — AZELASTINE 1 MG/ML
1 SPRAY, METERED NASAL 2 TIMES DAILY
Qty: 30 ML | Refills: 11 | Status: SHIPPED | OUTPATIENT
Start: 2022-08-05

## 2022-08-05 RX ORDER — CETIRIZINE HYDROCHLORIDE 10 MG/1
10 TABLET ORAL DAILY
Qty: 90 TABLET | Refills: 0 | Status: SHIPPED | OUTPATIENT
Start: 2022-08-05 | End: 2022-11-06

## 2022-08-05 NOTE — PROGRESS NOTES
Subjective:       Patient ID: Cheryle Olivares is a 52 y.o. female.    Chief Complaint: COVID-19 Concerns    HPI     The patient location is:  Home   The chief complaint leading to consultation is:  COVID  Total time spent with patient: 12 minutes  Visit type: Virtual visit with synchronous audio and video  Each patient to whom he or she provides medical services by telemedicine is: (1) informed of the relationship between the physician and patient and the respective role of any other health care provider with respect to management of the patient; and (2) notified that he or she may decline to receive medical services by telemedicine and may withdraw from such care at any time.    52-year-old female here for evaluation of COVID.  Patient is on day 12, and is worsening.  Patient had improved prior to this.  She reports that her energy is better.  She does not have the brain fog.  She is so congested and worse.  She felt like she was breathing on Tuesday.  She has thick mucus and it is hard to swallow. Her ears are closed up too.  She has a sinus headache.  She did not take paxlovid or an infusion.  She took sudafed for a couple days in the daytime.  She has been taking fluids, zyrtec, and singulair at night.  She does flonase during the day.  She has tried afrin for 2-3 days.  She retested last Saturday and was positive.  She has been taking mucinex with congestion and cough supressant in it.  She does not have a whole lot of cough.  Up until yesterday am, she filled back up again and nothing is coming out again.    Review of Systems   Constitutional: Positive for activity change. Negative for unexpected weight change.   HENT: Negative for hearing loss, rhinorrhea and trouble swallowing.    Eyes: Negative for discharge and visual disturbance.   Respiratory: Negative for chest tightness and wheezing.    Cardiovascular: Negative for chest pain and palpitations.   Gastrointestinal: Negative for blood in stool,  constipation, diarrhea and vomiting.   Endocrine: Negative for polydipsia and polyuria.   Genitourinary: Negative for difficulty urinating, dysuria, hematuria and menstrual problem.   Musculoskeletal: Negative for arthralgias, joint swelling and neck pain.   Neurological: Positive for weakness and headaches.   Psychiatric/Behavioral: Negative for confusion and dysphoric mood.         Objective:      Physical Exam  Constitutional:       General: She is not in acute distress.     Appearance: She is well-developed. She is not diaphoretic.   HENT:      Head: Normocephalic and atraumatic.      Right Ear: External ear normal.      Left Ear: External ear normal.   Musculoskeletal:      Cervical back: Normal range of motion.         Assessment:       1. COVID    2. Sinus congestion    3. Nonintractable headache, unspecified chronicity pattern, unspecified headache type      Plan:       1/2/3.  Patient advised to retest and if positive, would just treat symptomatically.  If negative, I would treat with an antibiotic.  Prescribed  Xyzal and Astelin.  Recommend patient to take this with Flonase.

## 2022-08-12 ENCOUNTER — PATIENT MESSAGE (OUTPATIENT)
Dept: NEUROLOGY | Facility: CLINIC | Age: 52
End: 2022-08-12
Payer: COMMERCIAL

## 2022-08-12 DIAGNOSIS — G43.019 INTRACTABLE MIGRAINE WITHOUT AURA AND WITHOUT STATUS MIGRAINOSUS: ICD-10-CM

## 2022-08-15 RX ORDER — DIHYDROERGOTAMINE MESYLATE 4 MG/ML
SPRAY, METERED NASAL
Qty: 6 ML | Refills: 5 | Status: SHIPPED | OUTPATIENT
Start: 2022-08-15

## 2022-08-19 RX ORDER — MONTELUKAST SODIUM 10 MG/1
TABLET ORAL
Qty: 90 TABLET | Refills: 3 | Status: SHIPPED | OUTPATIENT
Start: 2022-08-19 | End: 2023-01-09 | Stop reason: SDUPTHER

## 2022-08-19 NOTE — TELEPHONE ENCOUNTER
No new care gaps identified.  [facility] Embedded Care Gaps. Reference number: [id number]. 8/19/2022   1:30:47 AM CDT

## 2022-08-19 NOTE — TELEPHONE ENCOUNTER
Refill Decision Note   Cheryle Olivares  is requesting a refill authorization.  Brief Assessment and Rationale for Refill:  Approve     Medication Therapy Plan:       Medication Reconciliation Completed: No   Comments:     No Care Gaps recommended.     Note composed:8:35 AM 08/19/2022

## 2022-09-13 ENCOUNTER — OFFICE VISIT (OUTPATIENT)
Dept: INTERNAL MEDICINE | Facility: CLINIC | Age: 52
End: 2022-09-13
Payer: COMMERCIAL

## 2022-09-13 DIAGNOSIS — R21 RASH: Primary | ICD-10-CM

## 2022-09-13 PROCEDURE — 99213 PR OFFICE/OUTPT VISIT, EST, LEVL III, 20-29 MIN: ICD-10-PCS | Mod: 95,,, | Performed by: INTERNAL MEDICINE

## 2022-09-13 PROCEDURE — 99213 OFFICE O/P EST LOW 20 MIN: CPT | Mod: 95,,, | Performed by: INTERNAL MEDICINE

## 2022-09-13 PROCEDURE — 3044F PR MOST RECENT HEMOGLOBIN A1C LEVEL <7.0%: ICD-10-PCS | Mod: CPTII,95,, | Performed by: INTERNAL MEDICINE

## 2022-09-13 PROCEDURE — 3044F HG A1C LEVEL LT 7.0%: CPT | Mod: CPTII,95,, | Performed by: INTERNAL MEDICINE

## 2022-09-13 RX ORDER — METHYLPREDNISOLONE 4 MG/1
TABLET ORAL
Qty: 1 EACH | Refills: 0 | Status: SHIPPED | OUTPATIENT
Start: 2022-09-13 | End: 2022-12-01 | Stop reason: ALTCHOICE

## 2022-09-13 NOTE — PROGRESS NOTES
Subjective:       Patient ID: Cheryle Olivares is a 52 y.o. female.    Chief Complaint: Rash    Rash  This is a new problem. The current episode started 1 to 4 weeks ago. The problem has been waxing and waning since onset. The rash is diffuse. The rash is characterized by redness and itchiness. It is unknown if there was an exposure to a precipitant. Pertinent negatives include no anorexia, congestion, cough, diarrhea, eye pain, facial edema, fatigue, fever, joint pain, nail changes, rhinorrhea, shortness of breath, sore throat or vomiting. Past treatments include anti-itch cream and antihistamine. The treatment provided mild relief. Her past medical history is significant for allergies, eczema and varicella. There is no history of asthma.  The patient location is: [place], [place]  The chief complaint leading to consultation is: Rasj    Visit type: audiovisual    Face to Face time with patient: 8   minutes of total time spent on the encounter, which includes face to face time and non-face to face time preparing to see the patient (eg, review of tests), Obtaining and/or reviewing separately obtained history, Documenting clinical information in the electronic or other health record, Independently interpreting results (not separately reported) and communicating results to the patient/family/caregiver, or Care coordination (not separately reported).         Each patient to whom he or she provides medical services by telemedicine is:  (1) informed of the relationship between the physician and patient and the respective role of any other health care provider with respect to management of the patient; and (2) notified that he or she may decline to receive medical services by telemedicine and may withdraw from such care at any time.    Notes: Pt with itchy rash all over.  Unclear cause.  No tightness in throat, no wheezing, no SOB.  No swelling of lips or tongue.  No true hives - just diffuse rash.    Review of Systems    Constitutional:  Negative for fatigue and fever.   HENT:  Negative for congestion, rhinorrhea and sore throat.    Eyes:  Negative for pain.   Respiratory:  Negative for cough and shortness of breath.    Gastrointestinal:  Negative for anorexia, diarrhea and vomiting.   Musculoskeletal:  Negative for joint pain.   Skin:  Positive for rash. Negative for nail changes.     Objective:      Physical Exam  Constitutional:       General: She is not in acute distress.     Appearance: Normal appearance. She is not toxic-appearing.   Neurological:      Mental Status: She is alert.     No urticaria - just some diffuse redness  Assessment:       1. Rash          Plan:   Rash  -     Ambulatory referral/consult to Allergy; Future; Expected date: 09/20/2022  -     Ambulatory referral/consult to Dermatology; Future; Expected date: 09/20/2022    Other orders  -     methylPREDNISolone (MEDROL, MANUEL,) 4 mg tablet; use as directed  Dispense: 1 each; Refill: 0

## 2022-10-04 ENCOUNTER — TELEPHONE (OUTPATIENT)
Dept: INTERNAL MEDICINE | Facility: CLINIC | Age: 52
End: 2022-10-04

## 2022-10-18 ENCOUNTER — PATIENT MESSAGE (OUTPATIENT)
Dept: OPTOMETRY | Facility: CLINIC | Age: 52
End: 2022-10-18
Payer: COMMERCIAL

## 2022-10-31 ENCOUNTER — OFFICE VISIT (OUTPATIENT)
Dept: OPTOMETRY | Facility: CLINIC | Age: 52
End: 2022-10-31
Payer: COMMERCIAL

## 2022-10-31 DIAGNOSIS — H02.88A MEIBOMIAN GLAND DYSFUNCTION (MGD), BILATERAL, BOTH UPPER AND LOWER LIDS: ICD-10-CM

## 2022-10-31 DIAGNOSIS — H10.13 ALLERGIC CONJUNCTIVITIS, BILATERAL: Primary | ICD-10-CM

## 2022-10-31 DIAGNOSIS — H57.12 EYE PAIN, LEFT: ICD-10-CM

## 2022-10-31 DIAGNOSIS — H02.88B MEIBOMIAN GLAND DYSFUNCTION (MGD), BILATERAL, BOTH UPPER AND LOWER LIDS: ICD-10-CM

## 2022-10-31 PROCEDURE — 92014 COMPRE OPH EXAM EST PT 1/>: CPT | Mod: S$GLB,,, | Performed by: OPTOMETRIST

## 2022-10-31 PROCEDURE — 99999 PR PBB SHADOW E&M-EST. PATIENT-LVL II: CPT | Mod: PBBFAC,,, | Performed by: OPTOMETRIST

## 2022-10-31 PROCEDURE — 3044F PR MOST RECENT HEMOGLOBIN A1C LEVEL <7.0%: ICD-10-PCS | Mod: CPTII,S$GLB,, | Performed by: OPTOMETRIST

## 2022-10-31 PROCEDURE — 92014 PR EYE EXAM, EST PATIENT,COMPREHESV: ICD-10-PCS | Mod: S$GLB,,, | Performed by: OPTOMETRIST

## 2022-10-31 PROCEDURE — 1159F PR MEDICATION LIST DOCUMENTED IN MEDICAL RECORD: ICD-10-PCS | Mod: CPTII,S$GLB,, | Performed by: OPTOMETRIST

## 2022-10-31 PROCEDURE — 3044F HG A1C LEVEL LT 7.0%: CPT | Mod: CPTII,S$GLB,, | Performed by: OPTOMETRIST

## 2022-10-31 PROCEDURE — 1159F MED LIST DOCD IN RCRD: CPT | Mod: CPTII,S$GLB,, | Performed by: OPTOMETRIST

## 2022-10-31 PROCEDURE — 99999 PR PBB SHADOW E&M-EST. PATIENT-LVL II: ICD-10-PCS | Mod: PBBFAC,,, | Performed by: OPTOMETRIST

## 2022-10-31 NOTE — PROGRESS NOTES
HPI    Annual eye exam    DLS- 06/09/21    52 y.o. is here for annual eye exam  Pt states August she been having lots of pressure in OS  Pt feels the past couple of months she's been having sharp pain in OS  Pt have flashes and floaters   Pt had a ocular migraine 6 wks ago   Pt wear otc reading gl +1.75  Pt want to know are get something that will help to stop sharp pain in OS  Pataday for itch   Refresh     Last edited by Daniel Saavedra, OD on 10/31/2022 10:36 AM.            Assessment /Plan     For exam results, see Encounter Report.    Allergic conjunctivitis, bilateral  -Pataday QAM (green label is best)    Meibomian gland dysfunction (MGD), bilateral, both upper and lower lids  -Systane Complete PF  -reviewed sharp pain associated with surface impact     Eye pain, left  -Deep pain behind eye not associated with any notable Ocular impact today  -normal Color vision, no pallor noted ONH  -follow up Neuro as planned      RTC 1 yr

## 2022-11-03 ENCOUNTER — PROCEDURE VISIT (OUTPATIENT)
Dept: NEUROLOGY | Facility: CLINIC | Age: 52
End: 2022-11-03
Payer: COMMERCIAL

## 2022-11-03 VITALS
HEART RATE: 84 BPM | SYSTOLIC BLOOD PRESSURE: 137 MMHG | HEIGHT: 64 IN | BODY MASS INDEX: 30.56 KG/M2 | WEIGHT: 179 LBS | DIASTOLIC BLOOD PRESSURE: 92 MMHG

## 2022-11-03 DIAGNOSIS — G43.719 INTRACTABLE CHRONIC MIGRAINE WITHOUT AURA AND WITHOUT STATUS MIGRAINOSUS: Primary | ICD-10-CM

## 2022-11-03 PROCEDURE — 64615 CHEMODENERV MUSC MIGRAINE: CPT | Mod: S$GLB,,, | Performed by: PSYCHIATRY & NEUROLOGY

## 2022-11-03 PROCEDURE — 64615 PR CHEMODENERVATION OF MUSCLE FOR CHRONIC MIGRAINE: ICD-10-PCS | Mod: S$GLB,,, | Performed by: PSYCHIATRY & NEUROLOGY

## 2022-11-03 NOTE — PROCEDURES
Follow up:   Trigger for migraine - stress   Relieving factor  - exercise      Prior note:   Now has severe migraine 2/month   ubrelvy - partial relief     Prior note:   Overall doing much better   Feels BOTOX wore off 1 week ago     Prior note:   Overall unchanged   Still has nearly daily HAs  Ubrelvy  is effective      Prior note:   S/p PT, dry needling (SCM, occipitalis, trap) - very effective (but lasts only few days)   Clenched teeth - saw dentist, no solutions   HA are now 3/week. Also has days of CDH. Imitrex helps       Prior note:   Base of skull pain at early AM hours - radiates to front + dizziness      PT with dry needling helps when she has clusters of headaches   Stress is a trigger   Still doing yoga, meditation   Using mouth guard      Also reports throat 'spasms'. No benefit from cingulair      Taking Imitrex nearly daily      3/4/20 e-mail: Hello! First, I want to report that I'm still making amazing progress in reducing and almost eliminating my migraines! Physical therapy and stress reduction have been keys. I was recently able to add everyday exercise back into my routine, with few headaches afterward. This is miraculous! I have gone through all of my prescribed PT sessions, and I don't feel I need to continue on a regular basis. However, in order to continue to be physically active, both with at-home PT exercises and regular fitness (running, walking, yoga), it would be helpful to have PT every 2-3 weeks for awhile, as my muscles continue to adjust to better posture and more effective functioning. Is it possible to prescribe additional PT at less frequency? Thank you so much!     Prior note:   Stress -> Neck pain -> HA + nausea   overactivation of trap      Prior note:   Oct 2014 - diag with Hemicran cont (L) - pain points on the L side + photo/phono  Indomethacin 25mg PRN - helped   Currently 4/week Hz      Headache history:   Age of onset -  Childhood   Location - R or L   Nature of pain -   Throbbing   Prodrome - no   Aura -  No   Duration of headache - > 4 hrs   Time to peak intensity - hrs   Pain scale - range of intensity -  8/10  Frequency -  2-3/month   Status Migrainosus history - yes   Time of day of most headaches- anytime      Associated symptoms with the headache:   Meningeal symptoms - photophobia, phonophobia, exercise intolerance +   Nausea/vomitting+   Nasal drainage   Visual blurriness   Pallor/flushing  Dizziness +   Vertigo  Confusion  Impaired concentration +   Pain worsened with physical activity +   Neck pain   Cheek pain on R +      Cluster headache symptoms: none      Symptoms of increased intracranial pressure: none      Basilar migraine symptoms: none      Headache Triggers:   Stress +   Bright or flickering light  Strong smell  Vigorous exercise  Dietary factors   Visual strain   Weather changes +   Exertion  Heat (hot weather, hot baths or showers)  Menses     Other comorbid conditions:  Anxiety - yes   Motion sickness symptom - no      Treatment history:  Resolution of headache with sleep - yes   Meds tried:  H/o Renal stones   Fioricet, relpax, xanax   Flexeril   Medrol dose   maxalt - sluggish   Antiviral - not help   accupressure - helped   accupunture - helped   PT - helps   estradiol 0.05 mg/24 hr td ptsw (VIVELLE-DOT) 0.05 mg/24 hr  - twice weekly patch - helps with hot flashes, now on daily pill   Topamax 25 mg - not help   Gabapentin 100 mg  - drowsy, not help    mobic - helped   BOTOX#1 1/31/22 - dramatic improvement in HA   BOTOX#2 8/4/22 - dramatic improvement in HA      Headache risk factors:   H/o TBI  - no   H/o Meningitis  - no   F/h Aneurysms  - no     Headache burden:   In the last three months:  Days missed from work = several   Days unable to perform activities of daily living = 0  Days unable to attend social activities = several       Review of Systems   Constitutional: Negative.    HENT: Negative.    Eyes: Negative.    Respiratory: Negative.     Cardiovascular: Negative.    Gastrointestinal: Negative.    Endocrine: Negative.    Genitourinary: Negative.    Musculoskeletal: neck pain.    Skin: Negative.    Allergic/Immunologic: Negative.    Neurological: Negative.    Hematological: Negative.    Psychiatric/Behavioral: anxiety.        Objective:   Physical Exam   Constitutional: She is oriented to person, place, and time. She appears well-developed and well-nourished.   Psychiatric: She has a normal mood and affect. Her behavior is normal. Judgment and thought content normal.       Assessment:       1. Intractable chronic migraine without aura   2. Myofacial pain (pressure points)- masseter, neck   3. Tonsillar hernia into foramen magnum      Her HA are unlikely from a TAC and more likely from her tonsillar herniation and posture      Neck and upper chest - vibrating sensation      MRI C spine  Impression       No focal disk abnormalities or significant central canal or neural foraminal stenosis.  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    2-3 punctate foci of T2/flair signal hyperintensity frontal subcortical white matter which are nonspecific and of uncertain clinical significance.    Otherwise unremarkable MRI brain without evidence for acute infarction or enhancing lesion.    Electronically signed by: TRISH GOFF DO  Date: 10/24/14  Time: 14:27       Degenerative findings:   C2-C3: No spinal canal stenosis or neural foraminal narrowing.   C3-C4: Posterior disc osteophyte complex and left facet joint arthropathy resulting in mild left-sided neural foraminal narrowing.  No spinal canal stenosis.   C4-C5: Posterior disc osteophyte complex with no significant spinal canal stenosis or neural foraminal narrowing.   C5-C6: Posterior disc osteophyte complex with mild spinal canal stenosis.  No  neural foraminal narrowing.   C6-C7: Posterior disc osteophyte complex with no significant spinal canal stenosis or neural foraminal narrowing.   C7-T1: No spinal canal stenosis or neural foraminal narrowing.  Redemonstration perineural cysts within the proximal extraforaminal space bilaterally at this level which possibly compressing on the exiting nerve roots.   Paraspinal muscles & soft tissues: Unremarkable.     Impression:   1. Redemonstration of perineural cysts bilaterally at the level of C7-T1, as above.  2. No significant spinal canal stenosis or neural foraminal narrowing throughout the cervical spine, as above.     Electronically signed by resident: Macho Boone  Date:                                            08/20/2020  Time:                                           09:29     Electronically signed by: Mauricio Pitt MD  Date:                                            08/20/2020  Time:                                           09:55      Results for LAISHA HUMPHREY (MRN [id number]) as of 10/11/2019 09:44    Ref. Range 2/5/2019 10:19   Vitamin B-12 Latest Ref Range: 210 - 950 pg/mL >2000 (H)   Thiamine Latest Ref Range: 38 - 122 ug/L 45   Vit D, 25-Hydroxy Latest Ref Range: 30 - 96 ng/mL 75   Vitamin B2 Latest Ref Range: 1 - 19 mcg/L 18   Vitamin B6 Latest Ref Range: 5 - 50 ug/L 131 (H)   Antigliadin Ab IgG Latest Ref Range: <20 UNITS 2   Antigliadin Abs, IgA Latest Ref Range: <20 UNITS 7   TTG IgA Latest Ref Range: <20 UNITS 9   TTG IgG Latest Ref Range: <20 UNITS 3   Immunoglobulin A (IgA) Latest Ref Range: 70 - 400 mg/dL 172      Plan:   cont estrogen, cingulair   Breakthrough headache - imitrex 100 mg, xanax, mobic, ubrelvy, pain cream, trudhesa    Dentist - Floyd Erikcson ([place])     BOTOX was performed as an indicated therapy for intractable chronic migraine headaches given that the patient failed > 2 prophylactic medications     Botulinum Toxin Injection Procedure   Pre-operative  diagnosis: Chronic migraine   Post-operative diagnosis: Same   Procedure: Chemical neurolysis   After risks and benefits were explained including bleeding, infection, worsening of pain, damage to the areas being injected, weakness of muscles, loss of muscle control, dysphagia if injecting the head or neck, facial droop if injecting the facial area, painful injection, allergic or other reaction to the medications being injected, and the failure of the procedure to help the problem, a signed consent was obtained.   The patient was placed in a comfortable area and the sites to be treated were identified.The area to be treated was prepped three times with alcohol and the alcohol allowed to dry. Next, a 30 gauge needle was used to inject the medication in the area to be treated.   Area(s) injected:   Total Botox used: 175 Units   Botox wastage: 25 Units   Injection sites:    muscle bilaterally ( a total of 10 units divided into 2 sites)   Procerus muscle (5 units)   Frontalis muscle bilaterally (a total of 20 units divided into 4 sites)   Temporalis muscle bilaterally (a total of 40 units divided into 8 sites)   Occipitalis muscle bilaterally (a total of 30 units divided into 6 sites)   Cervical paraspinal muscles (a total of 20 units divided into 4 sites)   Trapezius muscle bilaterally (a total of 30 units divided into 6 sites)  Masseter 5 units bel    Mastoid 5 units   Complications: none   RTC for the next Botox injection: 3 months          Patient verbalized understanding to plan. I answered all her questions to her satisfaction    Procedures

## 2022-11-06 ENCOUNTER — OFFICE VISIT (OUTPATIENT)
Dept: URGENT CARE | Facility: CLINIC | Age: 52
End: 2022-11-06
Payer: COMMERCIAL

## 2022-11-06 VITALS
OXYGEN SATURATION: 98 % | WEIGHT: 179 LBS | HEART RATE: 82 BPM | BODY MASS INDEX: 30.56 KG/M2 | DIASTOLIC BLOOD PRESSURE: 103 MMHG | SYSTOLIC BLOOD PRESSURE: 148 MMHG | RESPIRATION RATE: 18 BRPM | HEIGHT: 64 IN | TEMPERATURE: 99 F

## 2022-11-06 DIAGNOSIS — S61.209A AVULSION OF FINGERTIP, INITIAL ENCOUNTER: Primary | ICD-10-CM

## 2022-11-06 PROCEDURE — 99213 PR OFFICE/OUTPT VISIT, EST, LEVL III, 20-29 MIN: ICD-10-PCS | Mod: 25,S$GLB,, | Performed by: NURSE PRACTITIONER

## 2022-11-06 PROCEDURE — 1159F MED LIST DOCD IN RCRD: CPT | Mod: CPTII,S$GLB,, | Performed by: NURSE PRACTITIONER

## 2022-11-06 PROCEDURE — 1160F RVW MEDS BY RX/DR IN RCRD: CPT | Mod: CPTII,S$GLB,, | Performed by: NURSE PRACTITIONER

## 2022-11-06 PROCEDURE — 3077F PR MOST RECENT SYSTOLIC BLOOD PRESSURE >= 140 MM HG: ICD-10-PCS | Mod: CPTII,S$GLB,, | Performed by: NURSE PRACTITIONER

## 2022-11-06 PROCEDURE — 3044F HG A1C LEVEL LT 7.0%: CPT | Mod: CPTII,S$GLB,, | Performed by: NURSE PRACTITIONER

## 2022-11-06 PROCEDURE — 1160F PR REVIEW ALL MEDS BY PRESCRIBER/CLIN PHARMACIST DOCUMENTED: ICD-10-PCS | Mod: CPTII,S$GLB,, | Performed by: NURSE PRACTITIONER

## 2022-11-06 PROCEDURE — 3044F PR MOST RECENT HEMOGLOBIN A1C LEVEL <7.0%: ICD-10-PCS | Mod: CPTII,S$GLB,, | Performed by: NURSE PRACTITIONER

## 2022-11-06 PROCEDURE — 97597 DBRDMT OPN WND 1ST 20 CM/<: CPT | Mod: S$GLB,,, | Performed by: NURSE PRACTITIONER

## 2022-11-06 PROCEDURE — 3008F BODY MASS INDEX DOCD: CPT | Mod: CPTII,S$GLB,, | Performed by: NURSE PRACTITIONER

## 2022-11-06 PROCEDURE — 3080F PR MOST RECENT DIASTOLIC BLOOD PRESSURE >= 90 MM HG: ICD-10-PCS | Mod: CPTII,S$GLB,, | Performed by: NURSE PRACTITIONER

## 2022-11-06 PROCEDURE — 90471 TDAP VACCINE GREATER THAN OR EQUAL TO 7YO IM: ICD-10-PCS | Mod: S$GLB,,, | Performed by: NURSE PRACTITIONER

## 2022-11-06 PROCEDURE — 90471 IMMUNIZATION ADMIN: CPT | Mod: S$GLB,,, | Performed by: NURSE PRACTITIONER

## 2022-11-06 PROCEDURE — 3080F DIAST BP >= 90 MM HG: CPT | Mod: CPTII,S$GLB,, | Performed by: NURSE PRACTITIONER

## 2022-11-06 PROCEDURE — 3008F PR BODY MASS INDEX (BMI) DOCUMENTED: ICD-10-PCS | Mod: CPTII,S$GLB,, | Performed by: NURSE PRACTITIONER

## 2022-11-06 PROCEDURE — 90715 TDAP VACCINE GREATER THAN OR EQUAL TO 7YO IM: ICD-10-PCS | Mod: S$GLB,,, | Performed by: NURSE PRACTITIONER

## 2022-11-06 PROCEDURE — 90715 TDAP VACCINE 7 YRS/> IM: CPT | Mod: S$GLB,,, | Performed by: NURSE PRACTITIONER

## 2022-11-06 PROCEDURE — 3077F SYST BP >= 140 MM HG: CPT | Mod: CPTII,S$GLB,, | Performed by: NURSE PRACTITIONER

## 2022-11-06 PROCEDURE — 1159F PR MEDICATION LIST DOCUMENTED IN MEDICAL RECORD: ICD-10-PCS | Mod: CPTII,S$GLB,, | Performed by: NURSE PRACTITIONER

## 2022-11-06 PROCEDURE — 99213 OFFICE O/P EST LOW 20 MIN: CPT | Mod: 25,S$GLB,, | Performed by: NURSE PRACTITIONER

## 2022-11-06 PROCEDURE — 97597 WOUND DEBRIDEMENT: ICD-10-PCS | Mod: S$GLB,,, | Performed by: NURSE PRACTITIONER

## 2022-11-06 NOTE — PROGRESS NOTES
"Subjective:       Patient ID: Cheryle Olivares is a 52 y.o. female.    Vitals:  height is 5' 4" (1.626 m) and weight is 81.2 kg (179 lb). Her oral temperature is 98.5 °F (36.9 °C). Her blood pressure is 148/103 (abnormal) and her pulse is 82. Her respiration is 18 and oxygen saturation is 98%.     Chief Complaint: Laceration    This is a 52 y.o. female who presents today with a chief complaint of laceration to thumb-tip of the right hand. Injury occurred today, prior to arrival. Pt was meal-prepping, and slicing cucumber with recently sharpened knife, when she cut into the distal part of the tip of her thumb. The site has been bleeding since, even with pressure placed, and elevation. Pt is currently on (or just completed) doxycycline for a cut to her other hand. Unsure of tetanus status. Reports pain to the site.     Laceration   The incident occurred 3 to 6 hours ago. The laceration is located on the Right hand. The laceration is 1 cm in size. The laceration mechanism was a clean knife. The pain is at a severity of 6/10. The pain is moderate. The pain has been Constant since onset. She reports no foreign bodies present. Her tetanus status is unknown.     Skin:  Positive for wound.     Objective:      Physical Exam   Constitutional: She is oriented to person, place, and time. She appears well-developed. She is cooperative.  Non-toxic appearance. She does not appear ill. No distress.   HENT:   Head: Normocephalic.   Ears:   Right Ear: Hearing normal.   Left Ear: Hearing normal.   Nose: No mucosal edema, rhinorrhea or nasal deformity. No epistaxis. Right sinus exhibits no maxillary sinus tenderness and no frontal sinus tenderness. Left sinus exhibits no maxillary sinus tenderness and no frontal sinus tenderness.   Mouth/Throat: Uvula is midline, oropharynx is clear and moist and mucous membranes are normal. No trismus in the jaw. Normal dentition. No uvula swelling. No oropharyngeal exudate, posterior " oropharyngeal edema or posterior oropharyngeal erythema.   Eyes: Lids are normal. No scleral icterus.   Neck: Trachea normal and phonation normal. Neck supple. No edema present. No erythema present. No neck rigidity present.   Cardiovascular: Normal rate.   Pulmonary/Chest: Effort normal. No respiratory distress. She has no decreased breath sounds. She has no rhonchi.   Abdominal: Normal appearance.   Musculoskeletal: Normal range of motion.         General: No deformity. Normal range of motion.   Neurological: She is alert and oriented to person, place, and time. She exhibits normal muscle tone. Coordination normal.   Skin: Skin is warm, dry, intact, not diaphoretic and not pale. Capillary refill takes less than 2 seconds.         Comments: Wound (0.75cm) with active bleeding (small amount) noted to right tip of thumb. Epidermis layer with small area of underemining appear avulsed, leaving only small piece of epidermis attached (0.2cm). Avulsion does not appear to be repairable by suturing. Bleeding controlled by pt placing pressure and keeping site elevated.   Psychiatric: Her speech is normal and behavior is normal. Judgment and thought content normal.   Nursing note and vitals reviewed.      Assessment:       1. Avulsion of fingertip, initial encounter          Plan:         Avulsion of fingertip, initial encounter  Comments:  right thumb  Orders:  -     (In Office Administered) Tdap Vaccine       Patient Instructions   Keep site clean and dry  Keep pressure wrapping to prevent bleeding  Use finger splint for comfort  Keep site elevated  Ice packs for bleeding  In 2 days start to use antibiotic ointment, (after skin starts to heal and is no longer wet/bleeding)   Follow up if you still have bleeding in 2 days  Follow up with any pus-like drainage, increased redness or swelling, or any fever  You will likely have scarring to the area; and may have numbness, as well

## 2022-11-07 NOTE — PATIENT INSTRUCTIONS
Keep site clean and dry  Keep pressure wrapping to prevent bleeding  Use finger splint for comfort  Keep site elevated  Ice packs for bleeding  In 2 days start to use antibiotic ointment, (after skin starts to heal and is no longer wet/bleeding)   Follow up if you still have bleeding in 2 days  Follow up with any pus-like drainage, increased redness or swelling, or any fever  You will likely have scarring to the area; and may have numbness, as well

## 2022-11-07 NOTE — PROCEDURES
Wound Debridement    Date/Time: 11/6/2022 3:45 PM  Performed by: NITA Ellison  Authorized by: NITA Ellison     Consent Done?:  Yes (Verbal)  Local anesthesia used?: Yes    Local anesthetic:  Lidocaine 1% without epinephrine  Anesthetic total (ml):  1    Wound Details:    Location:  Right hand    Type of Debridement:  Excisional       Length (cm):  0.7       Area (sq cm):  0.35       Width (cm):  0.5       Percent Debrided (%):  12       Depth (cm):  0.2       Total Area Debrided (sq cm):  0.04    Depth of debridement:  Epidermis/Dermis    Tissue debrided:  Epidermis and Dermis    Instruments:  Scissors    Bleeding:  Minimal  Hemostasis Achieved: Yes    Method Used:  Silver Nitrate  Patient tolerance:  Patient tolerated the procedure well with no immediate complications     Non stick dressing placed, with finger splint, and koban

## 2022-12-01 ENCOUNTER — OFFICE VISIT (OUTPATIENT)
Dept: INTERNAL MEDICINE | Facility: CLINIC | Age: 52
End: 2022-12-01
Payer: COMMERCIAL

## 2022-12-01 VITALS
OXYGEN SATURATION: 98 % | HEIGHT: 64 IN | HEART RATE: 91 BPM | BODY MASS INDEX: 29.66 KG/M2 | WEIGHT: 173.75 LBS | DIASTOLIC BLOOD PRESSURE: 90 MMHG | SYSTOLIC BLOOD PRESSURE: 140 MMHG

## 2022-12-01 DIAGNOSIS — F41.9 ANXIETY: ICD-10-CM

## 2022-12-01 DIAGNOSIS — S61.011D LACERATION OF RIGHT THUMB WITHOUT FOREIGN BODY WITHOUT DAMAGE TO NAIL, SUBSEQUENT ENCOUNTER: ICD-10-CM

## 2022-12-01 DIAGNOSIS — I10 ESSENTIAL HYPERTENSION: Primary | ICD-10-CM

## 2022-12-01 PROCEDURE — 3077F PR MOST RECENT SYSTOLIC BLOOD PRESSURE >= 140 MM HG: ICD-10-PCS | Mod: CPTII,S$GLB,, | Performed by: INTERNAL MEDICINE

## 2022-12-01 PROCEDURE — 3080F PR MOST RECENT DIASTOLIC BLOOD PRESSURE >= 90 MM HG: ICD-10-PCS | Mod: CPTII,S$GLB,, | Performed by: INTERNAL MEDICINE

## 2022-12-01 PROCEDURE — 3044F PR MOST RECENT HEMOGLOBIN A1C LEVEL <7.0%: ICD-10-PCS | Mod: CPTII,S$GLB,, | Performed by: INTERNAL MEDICINE

## 2022-12-01 PROCEDURE — 99214 PR OFFICE/OUTPT VISIT, EST, LEVL IV, 30-39 MIN: ICD-10-PCS | Mod: S$GLB,,, | Performed by: INTERNAL MEDICINE

## 2022-12-01 PROCEDURE — 3008F BODY MASS INDEX DOCD: CPT | Mod: CPTII,S$GLB,, | Performed by: INTERNAL MEDICINE

## 2022-12-01 PROCEDURE — 3044F HG A1C LEVEL LT 7.0%: CPT | Mod: CPTII,S$GLB,, | Performed by: INTERNAL MEDICINE

## 2022-12-01 PROCEDURE — 99999 PR PBB SHADOW E&M-EST. PATIENT-LVL V: CPT | Mod: PBBFAC,,, | Performed by: INTERNAL MEDICINE

## 2022-12-01 PROCEDURE — 1160F RVW MEDS BY RX/DR IN RCRD: CPT | Mod: CPTII,S$GLB,, | Performed by: INTERNAL MEDICINE

## 2022-12-01 PROCEDURE — 99214 OFFICE O/P EST MOD 30 MIN: CPT | Mod: S$GLB,,, | Performed by: INTERNAL MEDICINE

## 2022-12-01 PROCEDURE — 3008F PR BODY MASS INDEX (BMI) DOCUMENTED: ICD-10-PCS | Mod: CPTII,S$GLB,, | Performed by: INTERNAL MEDICINE

## 2022-12-01 PROCEDURE — 99999 PR PBB SHADOW E&M-EST. PATIENT-LVL V: ICD-10-PCS | Mod: PBBFAC,,, | Performed by: INTERNAL MEDICINE

## 2022-12-01 PROCEDURE — 1160F PR REVIEW ALL MEDS BY PRESCRIBER/CLIN PHARMACIST DOCUMENTED: ICD-10-PCS | Mod: CPTII,S$GLB,, | Performed by: INTERNAL MEDICINE

## 2022-12-01 PROCEDURE — 1159F PR MEDICATION LIST DOCUMENTED IN MEDICAL RECORD: ICD-10-PCS | Mod: CPTII,S$GLB,, | Performed by: INTERNAL MEDICINE

## 2022-12-01 PROCEDURE — 3077F SYST BP >= 140 MM HG: CPT | Mod: CPTII,S$GLB,, | Performed by: INTERNAL MEDICINE

## 2022-12-01 PROCEDURE — 1159F MED LIST DOCD IN RCRD: CPT | Mod: CPTII,S$GLB,, | Performed by: INTERNAL MEDICINE

## 2022-12-01 PROCEDURE — 3080F DIAST BP >= 90 MM HG: CPT | Mod: CPTII,S$GLB,, | Performed by: INTERNAL MEDICINE

## 2022-12-01 RX ORDER — AMLODIPINE BESYLATE 5 MG/1
5 TABLET ORAL DAILY
Qty: 30 TABLET | Refills: 3 | Status: SHIPPED | OUTPATIENT
Start: 2022-12-01 | End: 2023-01-09 | Stop reason: SDUPTHER

## 2022-12-01 NOTE — PATIENT INSTRUCTIONS
Consider scheduling appointment with [facility], [facility], [facility].     Psychiatry referral has been placed.

## 2022-12-01 NOTE — PROGRESS NOTES
"INTERNAL MEDICINE ESTABLISHED PATIENT VISIT NOTE    Subjective:     Chief Complaint: Follow-up       Patient ID: Cheryle Olivares is a 52 y.o. female with migraines, tonsillar hernia, HRT, thyroid nodule, last seen by me 6/2022, here today for follow-up.    Recent laceration to thumb while chopping vegetables. Evaluated at Urgent Care. Tdap vaccine administered. No stitches. Silver nitrate applied. Has been pressure wrapping area and applying antibiotic ointment. Dermatologist relative prescribed oral antibiotic, now complete.     Was told to check BP at home as elevated at last visit, 130-140/80-90. Trying to exercise and practice meditation. Ongoing personal stressors related to . Previously referred to [facility], looking for more long-term counseling.      Past Medical History:  Past Medical History:   Diagnosis Date    Allergy     Anemia     History of migraine headaches     Migraine headache     Morbid obesity     Preeclampsia, severe 7/15/2013       Review of Systems:  Review of Systems   Constitutional:  Negative for chills and fever.   HENT:  Negative for congestion.    Respiratory:  Negative for cough and shortness of breath.    Cardiovascular:  Negative for chest pain.   Gastrointestinal:  Negative for constipation, nausea and vomiting.   Genitourinary:  Negative for hematuria and urgency.   Musculoskeletal:  Negative for falls.   Skin:  Negative for rash.   Neurological:  Negative for dizziness and loss of consciousness.     Health Maintenance:   Immunizations:   Influenza - complete  Tdap - 4/2013  Covid 19 - complete  HPV  Prevnar rec at 66  Shingrix rec at 51 - 2nd dose     Cancer Screening:  PAP: 1/2015 NILM  Mammogram:  3/2022 BIRAD 1  Colonoscopy:  12/2021 diverticulosis; repeat 12/2026  DEXA:  n/a    Objective:   BP (!) 140/90 (BP Location: Left arm, Patient Position: Sitting)   Pulse 91   Ht 5' 4" (1.626 m)   Wt 78.8 kg (173 lb 11.6 oz)   LMP 11/27/2015 (Exact Date)   SpO2 98%   BMI " 29.82 kg/m²        MSK: Granulation tissue along distal aspect of R thumb. Minimal surrounding erythema, no edema. Remnants of silver nitrate noted on periphery of wound.    Labs:       Assessment/Plan     Essential hypertension  Elevated in clinic and at home; trial of low dose Norvasc  Close follow-up  -     amLODIPine (NORVASC) 5 MG tablet; Take 1 tablet (5 mg total) by mouth once daily.  Dispense: 30 tablet; Refill: 3    Laceration of right thumb without foreign body without damage to nail, subsequent encounter  No signs of infection, continue current management    Anxiety  Discussed need for counseling; advised to establish with Psychiatry given request for long-term care, also provided with external resources  -     Ambulatory referral/consult to Psychiatry; Future; Expected date: 12/08/2022     HM as above.    Virginie Soto MD  Department of Internal Medicine - [facility]  12/01/2022

## 2023-01-09 ENCOUNTER — OFFICE VISIT (OUTPATIENT)
Dept: INTERNAL MEDICINE | Facility: CLINIC | Age: 53
End: 2023-01-09
Payer: COMMERCIAL

## 2023-01-09 VITALS
SYSTOLIC BLOOD PRESSURE: 120 MMHG | DIASTOLIC BLOOD PRESSURE: 90 MMHG | HEIGHT: 64 IN | OXYGEN SATURATION: 97 % | WEIGHT: 176.5 LBS | BODY MASS INDEX: 30.13 KG/M2 | HEART RATE: 78 BPM

## 2023-01-09 DIAGNOSIS — F41.9 ANXIETY: ICD-10-CM

## 2023-01-09 DIAGNOSIS — R00.2 PALPITATIONS: ICD-10-CM

## 2023-01-09 DIAGNOSIS — Z71.89 COUNSELING FOR ESTROGEN REPLACEMENT THERAPY: ICD-10-CM

## 2023-01-09 DIAGNOSIS — I10 ESSENTIAL HYPERTENSION: Primary | ICD-10-CM

## 2023-01-09 DIAGNOSIS — J32.9 SINUSITIS, UNSPECIFIED CHRONICITY, UNSPECIFIED LOCATION: ICD-10-CM

## 2023-01-09 PROCEDURE — 3074F PR MOST RECENT SYSTOLIC BLOOD PRESSURE < 130 MM HG: ICD-10-PCS | Mod: CPTII,S$GLB,, | Performed by: INTERNAL MEDICINE

## 2023-01-09 PROCEDURE — 1159F MED LIST DOCD IN RCRD: CPT | Mod: CPTII,S$GLB,, | Performed by: INTERNAL MEDICINE

## 2023-01-09 PROCEDURE — 3074F SYST BP LT 130 MM HG: CPT | Mod: CPTII,S$GLB,, | Performed by: INTERNAL MEDICINE

## 2023-01-09 PROCEDURE — 3080F PR MOST RECENT DIASTOLIC BLOOD PRESSURE >= 90 MM HG: ICD-10-PCS | Mod: CPTII,S$GLB,, | Performed by: INTERNAL MEDICINE

## 2023-01-09 PROCEDURE — 1160F RVW MEDS BY RX/DR IN RCRD: CPT | Mod: CPTII,S$GLB,, | Performed by: INTERNAL MEDICINE

## 2023-01-09 PROCEDURE — 1160F PR REVIEW ALL MEDS BY PRESCRIBER/CLIN PHARMACIST DOCUMENTED: ICD-10-PCS | Mod: CPTII,S$GLB,, | Performed by: INTERNAL MEDICINE

## 2023-01-09 PROCEDURE — 99999 PR PBB SHADOW E&M-EST. PATIENT-LVL V: ICD-10-PCS | Mod: PBBFAC,,, | Performed by: INTERNAL MEDICINE

## 2023-01-09 PROCEDURE — 99999 PR PBB SHADOW E&M-EST. PATIENT-LVL V: CPT | Mod: PBBFAC,,, | Performed by: INTERNAL MEDICINE

## 2023-01-09 PROCEDURE — 99214 PR OFFICE/OUTPT VISIT, EST, LEVL IV, 30-39 MIN: ICD-10-PCS | Mod: S$GLB,,, | Performed by: INTERNAL MEDICINE

## 2023-01-09 PROCEDURE — 1159F PR MEDICATION LIST DOCUMENTED IN MEDICAL RECORD: ICD-10-PCS | Mod: CPTII,S$GLB,, | Performed by: INTERNAL MEDICINE

## 2023-01-09 PROCEDURE — 3008F PR BODY MASS INDEX (BMI) DOCUMENTED: ICD-10-PCS | Mod: CPTII,S$GLB,, | Performed by: INTERNAL MEDICINE

## 2023-01-09 PROCEDURE — 99214 OFFICE O/P EST MOD 30 MIN: CPT | Mod: S$GLB,,, | Performed by: INTERNAL MEDICINE

## 2023-01-09 PROCEDURE — 3080F DIAST BP >= 90 MM HG: CPT | Mod: CPTII,S$GLB,, | Performed by: INTERNAL MEDICINE

## 2023-01-09 PROCEDURE — 3008F BODY MASS INDEX DOCD: CPT | Mod: CPTII,S$GLB,, | Performed by: INTERNAL MEDICINE

## 2023-01-09 RX ORDER — MONTELUKAST SODIUM 10 MG/1
10 TABLET ORAL DAILY
Qty: 90 TABLET | Refills: 3 | Status: SHIPPED | OUTPATIENT
Start: 2023-01-09 | End: 2024-02-26 | Stop reason: SDUPTHER

## 2023-01-09 RX ORDER — AMLODIPINE BESYLATE 5 MG/1
5 TABLET ORAL DAILY
Qty: 90 TABLET | Refills: 3 | Status: SHIPPED | OUTPATIENT
Start: 2023-01-09 | End: 2024-02-02 | Stop reason: SDUPTHER

## 2023-01-09 RX ORDER — ESTRADIOL 0.5 MG/1
0.5 TABLET ORAL DAILY
Qty: 90 TABLET | Refills: 3 | Status: SHIPPED | OUTPATIENT
Start: 2023-01-09 | End: 2023-01-13 | Stop reason: SDUPTHER

## 2023-01-09 NOTE — PROGRESS NOTES
"INTERNAL MEDICINE ESTABLISHED PATIENT VISIT NOTE    Subjective:     Chief Complaint: Follow-up       Patient ID: Cheryle Olivares is a 52 y.o. female with migraines, tonsillar hernia, HRT, thyroid nodule, last seen by me 12/2022, here today for follow-up.    BP elevated in clinic and at home; started on low dose Norvasc. Checking BP at home, 120/80s. No side effects from medication.     Intermittent palpitations prior to starting anti-hypertensive. Prior cardiac work-up including holter monitor, EKG all WNL. Frequency has improved with stress management techniques.    Enquiring regarding appropriate frequency of Xanax use. Has not contacted Psychiatry for management of mood issues.     Past Medical History:  Past Medical History:   Diagnosis Date    Allergy     Anemia     History of migraine headaches     Migraine headache     Morbid obesity     Preeclampsia, severe 7/15/2013          Review of Systems:  Review of Systems   Constitutional:  Negative for chills and fever.   HENT:  Negative for congestion.    Respiratory:  Negative for cough and shortness of breath.    Cardiovascular:  Positive for palpitations. Negative for chest pain.   Gastrointestinal:  Negative for constipation, nausea and vomiting.   Genitourinary:  Negative for hematuria and urgency.   Musculoskeletal:  Negative for falls.   Skin:  Negative for rash.   Neurological:  Negative for dizziness and loss of consciousness.     Health Maintenance:   Immunizations:   Influenza - complete  Tdap - 4/2013  Covid 19 - complete  HPV  Prevnar rec at 66  Shingrix rec at 51 - 2nd dose     Cancer Screening:  PAP: 1/2015 NILM  Mammogram:  3/2022 BIRAD 1  Colonoscopy:  12/2021 diverticulosis; repeat 12/2026  DEXA:  n/a    Objective:   BP (!) 120/90 (BP Location: Left arm, Patient Position: Sitting)   Pulse 78   Ht 5' 4" (1.626 m)   Wt 80 kg (176 lb 7.7 oz)   LMP 11/27/2015 (Exact Date)   SpO2 97%   BMI 30.29 kg/m²        Labs:       Assessment/Plan "     Essential hypertension  Elevated in clinic, improved on repeat  Normotensive at home, continue current regimen  -     amLODIPine (NORVASC) 5 MG tablet; Take 1 tablet (5 mg total) by mouth once daily.  Dispense: 90 tablet; Refill: 3    Palpitations  Intermittent, improving with stress management  Monitor    Anxiety  Minimal use of Xanax PRN prescribed by previous PCP  Discussed if increased use noted, recommend alternate management of mood issues  Advised to schedule appointment with Psychiatry     Sinusitis, unspecified chronicity, unspecified location  -     montelukast (SINGULAIR) 10 mg tablet; Take 1 tablet (10 mg total) by mouth once daily.  Dispense: 90 tablet; Refill: 3    estrogen replacement therapy  -     estradioL (ESTRACE) 0.5 MG tablet; Take 1 tablet (0.5 mg total) by mouth once daily.  Dispense: 90 tablet; Refill: 3    HM as above.     Virginie Soto MD  Department of Internal Medicine - [facility]  01/09/2023

## 2023-01-13 ENCOUNTER — PATIENT MESSAGE (OUTPATIENT)
Dept: OBSTETRICS AND GYNECOLOGY | Facility: CLINIC | Age: 53
End: 2023-01-13

## 2023-01-13 ENCOUNTER — OFFICE VISIT (OUTPATIENT)
Dept: OBSTETRICS AND GYNECOLOGY | Facility: CLINIC | Age: 53
End: 2023-01-13
Payer: COMMERCIAL

## 2023-01-13 VITALS
DIASTOLIC BLOOD PRESSURE: 82 MMHG | SYSTOLIC BLOOD PRESSURE: 122 MMHG | WEIGHT: 176.5 LBS | BODY MASS INDEX: 30.13 KG/M2 | HEIGHT: 64 IN

## 2023-01-13 DIAGNOSIS — Z01.419 ENCOUNTER FOR ANNUAL ROUTINE GYNECOLOGICAL EXAMINATION: Primary | ICD-10-CM

## 2023-01-13 DIAGNOSIS — Z12.31 ENCOUNTER FOR SCREENING MAMMOGRAM FOR MALIGNANT NEOPLASM OF BREAST: ICD-10-CM

## 2023-01-13 DIAGNOSIS — Z71.89 COUNSELING FOR ESTROGEN REPLACEMENT THERAPY: ICD-10-CM

## 2023-01-13 PROCEDURE — 1159F MED LIST DOCD IN RCRD: CPT | Mod: CPTII,S$GLB,, | Performed by: OBSTETRICS & GYNECOLOGY

## 2023-01-13 PROCEDURE — 99396 PREV VISIT EST AGE 40-64: CPT | Mod: S$GLB,,, | Performed by: OBSTETRICS & GYNECOLOGY

## 2023-01-13 PROCEDURE — 1160F PR REVIEW ALL MEDS BY PRESCRIBER/CLIN PHARMACIST DOCUMENTED: ICD-10-PCS | Mod: CPTII,S$GLB,, | Performed by: OBSTETRICS & GYNECOLOGY

## 2023-01-13 PROCEDURE — 99999 PR PBB SHADOW E&M-EST. PATIENT-LVL III: CPT | Mod: PBBFAC,,, | Performed by: OBSTETRICS & GYNECOLOGY

## 2023-01-13 PROCEDURE — 99396 PR PREVENTIVE VISIT,EST,40-64: ICD-10-PCS | Mod: S$GLB,,, | Performed by: OBSTETRICS & GYNECOLOGY

## 2023-01-13 PROCEDURE — 3079F DIAST BP 80-89 MM HG: CPT | Mod: CPTII,S$GLB,, | Performed by: OBSTETRICS & GYNECOLOGY

## 2023-01-13 PROCEDURE — 3074F PR MOST RECENT SYSTOLIC BLOOD PRESSURE < 130 MM HG: ICD-10-PCS | Mod: CPTII,S$GLB,, | Performed by: OBSTETRICS & GYNECOLOGY

## 2023-01-13 PROCEDURE — 3008F PR BODY MASS INDEX (BMI) DOCUMENTED: ICD-10-PCS | Mod: CPTII,S$GLB,, | Performed by: OBSTETRICS & GYNECOLOGY

## 2023-01-13 PROCEDURE — 1160F RVW MEDS BY RX/DR IN RCRD: CPT | Mod: CPTII,S$GLB,, | Performed by: OBSTETRICS & GYNECOLOGY

## 2023-01-13 PROCEDURE — 3079F PR MOST RECENT DIASTOLIC BLOOD PRESSURE 80-89 MM HG: ICD-10-PCS | Mod: CPTII,S$GLB,, | Performed by: OBSTETRICS & GYNECOLOGY

## 2023-01-13 PROCEDURE — 99999 PR PBB SHADOW E&M-EST. PATIENT-LVL III: ICD-10-PCS | Mod: PBBFAC,,, | Performed by: OBSTETRICS & GYNECOLOGY

## 2023-01-13 PROCEDURE — 1159F PR MEDICATION LIST DOCUMENTED IN MEDICAL RECORD: ICD-10-PCS | Mod: CPTII,S$GLB,, | Performed by: OBSTETRICS & GYNECOLOGY

## 2023-01-13 PROCEDURE — 3074F SYST BP LT 130 MM HG: CPT | Mod: CPTII,S$GLB,, | Performed by: OBSTETRICS & GYNECOLOGY

## 2023-01-13 PROCEDURE — 3008F BODY MASS INDEX DOCD: CPT | Mod: CPTII,S$GLB,, | Performed by: OBSTETRICS & GYNECOLOGY

## 2023-01-13 RX ORDER — ESTRADIOL 0.5 MG/1
0.5 TABLET ORAL DAILY
Qty: 90 TABLET | Refills: 3 | Status: SHIPPED | OUTPATIENT
Start: 2023-01-13 | End: 2024-02-07 | Stop reason: SDUPTHER

## 2023-01-13 NOTE — PROGRESS NOTES
"Chief Complaint: Well Woman Exam     HPI:      Cheryle Olivares is a 52 y.o.  who presents today for well woman exam.  LMP: Patient's last menstrual period was 2015 (exact date).  No issues, problems, or complaints. Specifically, patient denies abnormal vaginal bleeding, discharge, pelvic pain, urinary problems, or changes in appetite. Ms. Olivares is currently sexually active with a single male partner.  She declines STD screening today.    Previous Pap: no abnormalities per patients (hyst 2/2 AUB)  Previous Mammogram: BiRads: 1 T-C Score: 8.3% (3/25/22)   Most Recent Colonoscopy: WNL (2021), Repeat in     OB History          2    Para   2    Term   2            AB        Living   2         SAB        IAB        Ectopic        Multiple        Live Births   2               ROS:     GENERAL: Denies unintentional weight gain or weight loss. Feeling well overall.   SKIN: Denies rash or lesions.   HEENT: Denies headaches, or vision changes.   CARDIOVASCULAR: Denies palpitations or chest pain. But does have "spasms" in the chest - usually stress related. PCP is working up. Happens about once every 4-6 weeks.   RESPIRATORY: Denies shortness of breath or dyspnea on exertion.  BREASTS: Denies lumps or nipple discharge.   ABDOMEN: Denies constipation, diarrhea, nausea, vomiting, change in appetite.  URINARY: Denies frequency, dysuria.  NEUROLOGIC: Denies syncope or weakness.   PSYCHIATRIC: Denies uncontrolled depression or anxiety.    Physical Exam:      PHYSICAL EXAM:  /82   Ht 5' 4" (1.626 m)   Wt 80 kg (176 lb 7.7 oz)   LMP 2015 (Exact Date)   BMI 30.29 kg/m²   Body mass index is 30.29 kg/m².     APPEARANCE: Well nourished, well developed, in no acute distress.  PSYCH: Appropriate mood and affect.  SKIN: No acne or hirsutism  NECK: Neck symmetric without masses  NODES: No inguinal, axillary, or supraclavicular lymph node enlargement  ABDOMEN: Soft.  No tenderness or " masses.    CARDIOVASCULAR: No edema of peripheral extremities  BREASTS: Symmetrical, no visible skin lesions. No palpable masses. No nipple discharge bilaterally.  PELVIC: Normal external genitalia without lesions.  Normal hair distribution.  Adequate perineal body, normal urethral meatus.  Vagina moist and smooth. Without lesions. Vagina without discharge.  Normal appearing vaginal cuff.  No significant cystocele or rectocele.  Bimanual exam shows no midline or adnexal masses.      Assessment/Plan:     Encounter for annual routine gynecological examination    Encounter for screening mammogram for malignant neoplasm of breast  -     Mammo Digital Screening Bil[facility] w/ [facility]; Future; Expected date: 01/13/2023    estrogen replacement therapy  -     estradioL (ESTRACE) 0.5 MG tablet; Take 1 tablet (0.5 mg total) by mouth once daily.  Dispense: 90 tablet; Refill: 3      Follow up in about 1 year (around 1/13/2024) for Annual Exam.    Counseling:     Patient was counseled today on current ASCCP pap guidelines, the recommendation for yearly physical exams, safe driving habits, breast self awareness and annual mammograms. She is to see her PCP for other health maintenance.     Use of the [facility] Patient Portal discussed and encouraged during today's visit.

## 2023-02-02 ENCOUNTER — PROCEDURE VISIT (OUTPATIENT)
Dept: NEUROLOGY | Facility: CLINIC | Age: 53
End: 2023-02-02
Payer: COMMERCIAL

## 2023-02-02 VITALS
WEIGHT: 173.75 LBS | SYSTOLIC BLOOD PRESSURE: 136 MMHG | BODY MASS INDEX: 29.82 KG/M2 | HEART RATE: 72 BPM | DIASTOLIC BLOOD PRESSURE: 84 MMHG

## 2023-02-02 DIAGNOSIS — G43.719 INTRACTABLE CHRONIC MIGRAINE WITHOUT AURA AND WITHOUT STATUS MIGRAINOSUS: Primary | ICD-10-CM

## 2023-02-02 DIAGNOSIS — G43.019 INTRACTABLE MIGRAINE WITHOUT AURA AND WITHOUT STATUS MIGRAINOSUS: ICD-10-CM

## 2023-02-02 PROCEDURE — 64615 PR CHEMODENERVATION OF MUSCLE FOR CHRONIC MIGRAINE: ICD-10-PCS | Mod: S$GLB,,, | Performed by: PSYCHIATRY & NEUROLOGY

## 2023-02-02 PROCEDURE — 64615 CHEMODENERV MUSC MIGRAINE: CPT | Mod: S$GLB,,, | Performed by: PSYCHIATRY & NEUROLOGY

## 2023-02-02 NOTE — PROCEDURES
Follow up:   Overall doing well   started amlodipine     Prior note:   Trigger for migraine - stress   Relieving factor  - exercise      Prior note:   Now has severe migraine 2/month   ubrelvy - partial relief     Prior note:   Overall doing much better   Feels BOTOX wore off 1 week ago     Prior note:   Overall unchanged   Still has nearly daily HAs  Ubrelvy  is effective      Prior note:   S/p PT, dry needling (SCM, occipitalis, trap) - very effective (but lasts only few days)   Clenched teeth - saw dentist, no solutions   HA are now 3/week. Also has days of CDH. Imitrex helps       Prior note:   Base of skull pain at early AM hours - radiates to front + dizziness      PT with dry needling helps when she has clusters of headaches   Stress is a trigger   Still doing yoga, meditation   Using mouth guard      Also reports throat 'spasms'. No benefit from cingulair      Taking Imitrex nearly daily      3/4/20 e-mail: Hello! First, I want to report that I'm still making amazing progress in reducing and almost eliminating my migraines! Physical therapy and stress reduction have been keys. I was recently able to add everyday exercise back into my routine, with few headaches afterward. This is miraculous! I have gone through all of my prescribed PT sessions, and I don't feel I need to continue on a regular basis. However, in order to continue to be physically active, both with at-home PT exercises and regular fitness (running, walking, yoga), it would be helpful to have PT every 2-3 weeks for awhile, as my muscles continue to adjust to better posture and more effective functioning. Is it possible to prescribe additional PT at less frequency? Thank you so much!     Prior note:   Stress -> Neck pain -> HA + nausea   overactivation of trap      Prior note:   Oct 2014 - diag with Hemicran cont (L) - pain points on the L side + photo/phono  Indomethacin 25mg PRN - helped   Currently 4/week Hz      Headache history:   Age of  onset -  Childhood   Location - R or L   Nature of pain -  Throbbing   Prodrome - no   Aura -  No   Duration of headache - > 4 hrs   Time to peak intensity - hrs   Pain scale - range of intensity -  8/10  Frequency -  2-3/month   Status Migrainosus history - yes   Time of day of most headaches- anytime      Associated symptoms with the headache:   Meningeal symptoms - photophobia, phonophobia, exercise intolerance +   Nausea/vomitting+   Nasal drainage   Visual blurriness   Pallor/flushing  Dizziness +   Vertigo  Confusion  Impaired concentration +   Pain worsened with physical activity +   Neck pain   Cheek pain on R +      Cluster headache symptoms: none      Symptoms of increased intracranial pressure: none      Basilar migraine symptoms: none      Headache Triggers:   Stress +   Bright or flickering light  Strong smell  Vigorous exercise  Dietary factors   Visual strain   Weather changes +   Exertion  Heat (hot weather, hot baths or showers)  Menses     Other comorbid conditions:  Anxiety - yes   Motion sickness symptom - no      Treatment history:  Resolution of headache with sleep - yes   Meds tried:  H/o Renal stones   Fioricet, relpax, xanax   Flexeril   Medrol dose   maxalt - sluggish   Antiviral - not help   accupressure - helped   accupunture - helped   PT - helps   estradiol 0.05 mg/24 hr td ptsw (VIVELLE-DOT) 0.05 mg/24 hr  - twice weekly patch - helps with hot flashes, now on daily pill   Topamax 25 mg - not help   Gabapentin 100 mg  - drowsy, not help    mobic - helped   BOTOX#1 1/31/22 - dramatic improvement in HA   BOTOX#2 8/4/22 - dramatic improvement in HA      Headache risk factors:   H/o TBI  - no   H/o Meningitis  - no   F/h Aneurysms  - no     Headache burden:   In the last three months:  Days missed from work = several   Days unable to perform activities of daily living = 0  Days unable to attend social activities = several       Review of Systems   Constitutional: Negative.    HENT:  Negative.    Eyes: Negative.    Respiratory: Negative.    Cardiovascular: Negative.    Gastrointestinal: Negative.    Endocrine: Negative.    Genitourinary: Negative.    Musculoskeletal: neck pain.    Skin: Negative.    Allergic/Immunologic: Negative.    Neurological: Negative.    Hematological: Negative.    Psychiatric/Behavioral: anxiety.        Objective:   Physical Exam   Constitutional: She is oriented to person, place, and time. She appears well-developed and well-nourished.   Psychiatric: She has a normal mood and affect. Her behavior is normal. Judgment and thought content normal.       Assessment:       1. Intractable chronic migraine without aura   2. Myofacial pain (pressure points)- masseter, neck   3. Tonsillar hernia into foramen magnum      Her HA are unlikely from a TAC and more likely from her tonsillar herniation and posture      Neck and upper chest - vibrating sensation      MRI C spine  Impression       No focal disk abnormalities or significant central canal or neural foraminal stenosis.  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    2-3 punctate foci of T2/flair signal hyperintensity frontal subcortical white matter which are nonspecific and of uncertain clinical significance.    Otherwise unremarkable MRI brain without evidence for acute infarction or enhancing lesion.    Electronically signed by: TRISH GOFF DO  Date: 10/24/14  Time: 14:27       Degenerative findings:   C2-C3: No spinal canal stenosis or neural foraminal narrowing.   C3-C4: Posterior disc osteophyte complex and left facet joint arthropathy resulting in mild left-sided neural foraminal narrowing.  No spinal canal stenosis.   C4-C5: Posterior disc osteophyte complex with no significant spinal canal stenosis or neural foraminal narrowing.   C5-C6: Posterior disc  osteophyte complex with mild spinal canal stenosis.  No neural foraminal narrowing.   C6-C7: Posterior disc osteophyte complex with no significant spinal canal stenosis or neural foraminal narrowing.   C7-T1: No spinal canal stenosis or neural foraminal narrowing.  Redemonstration perineural cysts within the proximal extraforaminal space bilaterally at this level which possibly compressing on the exiting nerve roots.   Paraspinal muscles & soft tissues: Unremarkable.     Impression:   1. Redemonstration of perineural cysts bilaterally at the level of C7-T1, as above.  2. No significant spinal canal stenosis or neural foraminal narrowing throughout the cervical spine, as above.     Electronically signed by resident: Macho Boone  Date:                                            08/20/2020  Time:                                           09:29     Electronically signed by: Mauricio Pitt MD  Date:                                            08/20/2020  Time:                                           09:55      Results for LAISHA HUMPHREY (MRN [id number]) as of 10/11/2019 09:44    Ref. Range 2/5/2019 10:19   Vitamin B-12 Latest Ref Range: 210 - 950 pg/mL >2000 (H)   Thiamine Latest Ref Range: 38 - 122 ug/L 45   Vit D, 25-Hydroxy Latest Ref Range: 30 - 96 ng/mL 75   Vitamin B2 Latest Ref Range: 1 - 19 mcg/L 18   Vitamin B6 Latest Ref Range: 5 - 50 ug/L 131 (H)   Antigliadin Ab IgG Latest Ref Range: <20 UNITS 2   Antigliadin Abs, IgA Latest Ref Range: <20 UNITS 7   TTG IgA Latest Ref Range: <20 UNITS 9   TTG IgG Latest Ref Range: <20 UNITS 3   Immunoglobulin A (IgA) Latest Ref Range: 70 - 400 mg/dL 172      Plan:   cont estrogen, Singulair, amlodipine   Breakthrough headache - imitrex 100 mg, xanax, mobic, ubrelvy, pain cream, trudhesa    Dentist - Floyd Erickson ([place])     BOTOX was performed as an indicated therapy for intractable chronic migraine headaches given that the patient failed > 2 prophylactic  medications     Botulinum Toxin Injection Procedure   Pre-operative diagnosis: Chronic migraine   Post-operative diagnosis: Same   Procedure: Chemical neurolysis   After risks and benefits were explained including bleeding, infection, worsening of pain, damage to the areas being injected, weakness of muscles, loss of muscle control, dysphagia if injecting the head or neck, facial droop if injecting the facial area, painful injection, allergic or other reaction to the medications being injected, and the failure of the procedure to help the problem, a signed consent was obtained.   The patient was placed in a comfortable area and the sites to be treated were identified.The area to be treated was prepped three times with alcohol and the alcohol allowed to dry. Next, a 30 gauge needle was used to inject the medication in the area to be treated.   Area(s) injected:   Total Botox used: 175 Units   Botox wastage: 25 Units   Injection sites:    muscle bilaterally ( a total of 10 units divided into 2 sites)   Procerus muscle (5 units)   Frontalis muscle bilaterally (a total of 20 units divided into 4 sites)   Temporalis muscle bilaterally (a total of 40 units divided into 8 sites)   Occipitalis muscle bilaterally (a total of 30 units divided into 6 sites)   Cervical paraspinal muscles (a total of 20 units divided into 4 sites)   Trapezius muscle bilaterally (a total of 30 units divided into 6 sites)  Masseter 5 units bel    Mastoid 5 units   Complications: none   RTC for the next Botox injection: 3 months          Patient verbalized understanding to plan. I answered all her questions to her satisfaction    Procedures

## 2023-03-03 ENCOUNTER — PATIENT MESSAGE (OUTPATIENT)
Dept: NEUROLOGY | Facility: CLINIC | Age: 53
End: 2023-03-03
Payer: COMMERCIAL
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
## 2023-03-09 ENCOUNTER — OFFICE VISIT (OUTPATIENT)
Dept: PRIMARY CARE CLINIC | Facility: CLINIC | Age: 53
End: 2023-03-09
Payer: COMMERCIAL

## 2023-03-09 VITALS
HEIGHT: 64 IN | OXYGEN SATURATION: 98 % | BODY MASS INDEX: 30.04 KG/M2 | WEIGHT: 175.94 LBS | HEART RATE: 67 BPM | SYSTOLIC BLOOD PRESSURE: 112 MMHG | DIASTOLIC BLOOD PRESSURE: 84 MMHG

## 2023-03-09 DIAGNOSIS — Z71.89 COUNSELING FOR ESTROGEN REPLACEMENT THERAPY: ICD-10-CM

## 2023-03-09 DIAGNOSIS — Z63.9 RELATIONSHIP PROBLEMS: ICD-10-CM

## 2023-03-09 DIAGNOSIS — Z76.89 ESTABLISHING CARE WITH NEW DOCTOR, ENCOUNTER FOR: Primary | ICD-10-CM

## 2023-03-09 DIAGNOSIS — F41.9 ANXIETY: ICD-10-CM

## 2023-03-09 DIAGNOSIS — E78.5 HYPERLIPIDEMIA, UNSPECIFIED HYPERLIPIDEMIA TYPE: ICD-10-CM

## 2023-03-09 DIAGNOSIS — G43.719 INTRACTABLE CHRONIC MIGRAINE WITHOUT AURA AND WITHOUT STATUS MIGRAINOSUS: ICD-10-CM

## 2023-03-09 PROCEDURE — 3008F BODY MASS INDEX DOCD: CPT | Mod: CPTII,S$GLB,, | Performed by: STUDENT IN AN ORGANIZED HEALTH CARE EDUCATION/TRAINING PROGRAM

## 2023-03-09 PROCEDURE — 3074F PR MOST RECENT SYSTOLIC BLOOD PRESSURE < 130 MM HG: ICD-10-PCS | Mod: CPTII,S$GLB,, | Performed by: STUDENT IN AN ORGANIZED HEALTH CARE EDUCATION/TRAINING PROGRAM

## 2023-03-09 PROCEDURE — 99214 OFFICE O/P EST MOD 30 MIN: CPT | Mod: S$GLB,,, | Performed by: STUDENT IN AN ORGANIZED HEALTH CARE EDUCATION/TRAINING PROGRAM

## 2023-03-09 PROCEDURE — 99999 PR PBB SHADOW E&M-EST. PATIENT-LVL V: ICD-10-PCS | Mod: PBBFAC,,, | Performed by: STUDENT IN AN ORGANIZED HEALTH CARE EDUCATION/TRAINING PROGRAM

## 2023-03-09 PROCEDURE — 1159F MED LIST DOCD IN RCRD: CPT | Mod: CPTII,S$GLB,, | Performed by: STUDENT IN AN ORGANIZED HEALTH CARE EDUCATION/TRAINING PROGRAM

## 2023-03-09 PROCEDURE — 1159F PR MEDICATION LIST DOCUMENTED IN MEDICAL RECORD: ICD-10-PCS | Mod: CPTII,S$GLB,, | Performed by: STUDENT IN AN ORGANIZED HEALTH CARE EDUCATION/TRAINING PROGRAM

## 2023-03-09 PROCEDURE — 99999 PR PBB SHADOW E&M-EST. PATIENT-LVL V: CPT | Mod: PBBFAC,,, | Performed by: STUDENT IN AN ORGANIZED HEALTH CARE EDUCATION/TRAINING PROGRAM

## 2023-03-09 PROCEDURE — 3079F DIAST BP 80-89 MM HG: CPT | Mod: CPTII,S$GLB,, | Performed by: STUDENT IN AN ORGANIZED HEALTH CARE EDUCATION/TRAINING PROGRAM

## 2023-03-09 PROCEDURE — 99214 PR OFFICE/OUTPT VISIT, EST, LEVL IV, 30-39 MIN: ICD-10-PCS | Mod: S$GLB,,, | Performed by: STUDENT IN AN ORGANIZED HEALTH CARE EDUCATION/TRAINING PROGRAM

## 2023-03-09 PROCEDURE — 3008F PR BODY MASS INDEX (BMI) DOCUMENTED: ICD-10-PCS | Mod: CPTII,S$GLB,, | Performed by: STUDENT IN AN ORGANIZED HEALTH CARE EDUCATION/TRAINING PROGRAM

## 2023-03-09 PROCEDURE — 3074F SYST BP LT 130 MM HG: CPT | Mod: CPTII,S$GLB,, | Performed by: STUDENT IN AN ORGANIZED HEALTH CARE EDUCATION/TRAINING PROGRAM

## 2023-03-09 PROCEDURE — 3079F PR MOST RECENT DIASTOLIC BLOOD PRESSURE 80-89 MM HG: ICD-10-PCS | Mod: CPTII,S$GLB,, | Performed by: STUDENT IN AN ORGANIZED HEALTH CARE EDUCATION/TRAINING PROGRAM

## 2023-03-09 SDOH — SOCIAL DETERMINANTS OF HEALTH (SDOH): PROBLEM RELATED TO PRIMARY SUPPORT GROUP, UNSPECIFIED: Z63.9

## 2023-03-24 ENCOUNTER — PATIENT MESSAGE (OUTPATIENT)
Dept: PRIMARY CARE CLINIC | Facility: CLINIC | Age: 53
End: 2023-03-24
Payer: COMMERCIAL

## 2023-03-27 ENCOUNTER — HOSPITAL ENCOUNTER (OUTPATIENT)
Dept: RADIOLOGY | Facility: HOSPITAL | Age: 53
Discharge: HOME | End: 2023-03-27
Attending: OBSTETRICS & GYNECOLOGY
Payer: COMMERCIAL

## 2023-03-27 DIAGNOSIS — Z12.31 ENCOUNTER FOR SCREENING MAMMOGRAM FOR MALIGNANT NEOPLASM OF BREAST: ICD-10-CM

## 2023-03-27 PROCEDURE — 77067 MAMMO DIGITAL SCREENING BILAT WITH TOMO: ICD-10-PCS | Mod: 26,,, | Performed by: RADIOLOGY

## 2023-03-27 PROCEDURE — 77063 MAMMO DIGITAL SCREENING BILAT WITH TOMO: ICD-10-PCS | Mod: 26,,, | Performed by: RADIOLOGY

## 2023-03-27 PROCEDURE — 77067 SCR MAMMO BI INCL CAD: CPT | Mod: TC

## 2023-03-27 PROCEDURE — 77067 SCR MAMMO BI INCL CAD: CPT | Mod: 26,,, | Performed by: RADIOLOGY

## 2023-03-27 PROCEDURE — 77063 BREAST TOMOSYNTHESIS BI: CPT | Mod: 26,,, | Performed by: RADIOLOGY

## 2023-04-02 ENCOUNTER — PATIENT MESSAGE (OUTPATIENT)
Dept: PRIMARY CARE CLINIC | Facility: CLINIC | Age: 53
End: 2023-04-02
Payer: COMMERCIAL

## 2023-04-02 DIAGNOSIS — S05.91XS RIGHT EYE INJURY, SEQUELA: ICD-10-CM

## 2023-04-02 DIAGNOSIS — H57.89 EYE REDNESS: Primary | ICD-10-CM

## 2023-04-03 ENCOUNTER — OFFICE VISIT (OUTPATIENT)
Dept: OPTOMETRY | Facility: CLINIC | Age: 53
End: 2023-04-03
Payer: COMMERCIAL

## 2023-04-03 ENCOUNTER — PATIENT MESSAGE (OUTPATIENT)
Dept: OPTOMETRY | Facility: CLINIC | Age: 53
End: 2023-04-03

## 2023-04-03 DIAGNOSIS — H57.89 EYE REDNESS: ICD-10-CM

## 2023-04-03 DIAGNOSIS — H11.31 SUBCONJUNCTIVAL HEMORRHAGE OF RIGHT EYE: Primary | ICD-10-CM

## 2023-04-03 DIAGNOSIS — S05.91XS RIGHT EYE INJURY, SEQUELA: ICD-10-CM

## 2023-04-03 PROCEDURE — 99999 PR PBB SHADOW E&M-EST. PATIENT-LVL III: CPT | Mod: PBBFAC,,, | Performed by: OPTOMETRIST

## 2023-04-03 PROCEDURE — 99999 PR PBB SHADOW E&M-EST. PATIENT-LVL III: ICD-10-PCS | Mod: PBBFAC,,, | Performed by: OPTOMETRIST

## 2023-04-03 PROCEDURE — 1159F MED LIST DOCD IN RCRD: CPT | Mod: CPTII,S$GLB,, | Performed by: OPTOMETRIST

## 2023-04-03 PROCEDURE — 99213 OFFICE O/P EST LOW 20 MIN: CPT | Mod: S$GLB,,, | Performed by: OPTOMETRIST

## 2023-04-03 PROCEDURE — 99213 PR OFFICE/OUTPT VISIT, EST, LEVL III, 20-29 MIN: ICD-10-PCS | Mod: S$GLB,,, | Performed by: OPTOMETRIST

## 2023-04-03 PROCEDURE — 1159F PR MEDICATION LIST DOCUMENTED IN MEDICAL RECORD: ICD-10-PCS | Mod: CPTII,S$GLB,, | Performed by: OPTOMETRIST
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
## 2023-04-03 NOTE — PATIENT INSTRUCTIONS
Blepharitis    Blepharitis is a common inflammatory condition where the edges of the eyelids become red, swollen and inflamed. Blepharitis is often caused by a build-up of bacteria and mites living along the eyelid and eyelashes. Closely associated with Blepharitis is Meibomian Gland Dysfunction (MGD).  MGD is a chronic condition which occurs when the glands in the eyelids dont produce enough oil, or the oil that is produced is of poor quality. This oil is called Meibum. It protects the watery layer of tears which cover your eyes and prevents them from drying out.  Combined these are the most common forms of Dry Eye Disease.    At home treatments that are recommended:    Avoid Ceiling Fans, Forced air environment, direct vents blowing straight on eye.      Preservative Free:    Using preservative free products is important for effective relief of dry eye symptoms. Preservatives can cause further irritation to the surface of the eye. When shopping for dry eye products look for preservative free on the label!            Night time Gel or Ointment:                                 All products available on Amazon  [employer] is a great resource for their line of products

## 2023-04-03 NOTE — TELEPHONE ENCOUNTER
Hi,, I put in an urgent referral to ophthalmology for evaluation.  Can we let her know that with that level of redness it would be good for her to see the specialist, even if she does have a history of dry eyes.  Any other symptoms with that? Any itching, tearing, pain?  Ophthalmologist have equipment in the office to be able to look in the eye more than we can here in primary care.  They can also help evaluate the eye injury on the left.  Is she having any tenderness? there does not look like there is a lot of soft tissue swelling in the picture but it is hard to tell for sure.  Is she having any vision trouble or issues moving her eyes?  Any of those symptoms would warrant emergent evaluation in emergency room.  If she is having any tenderness, we can get an x-ray of the orbits to make sure there is a tiny fracture while we wait for ophthalmology to get scheduled.

## 2023-04-21 ENCOUNTER — PATIENT MESSAGE (OUTPATIENT)
Dept: NEUROLOGY | Facility: CLINIC | Age: 53
End: 2023-04-21
Payer: COMMERCIAL

## 2023-04-25 ENCOUNTER — OFFICE VISIT (OUTPATIENT)
Dept: INTERNAL MEDICINE | Facility: CLINIC | Age: 53
End: 2023-04-25
Payer: COMMERCIAL

## 2023-04-25 DIAGNOSIS — N39.0 URINARY TRACT INFECTION WITHOUT HEMATURIA, SITE UNSPECIFIED: Primary | ICD-10-CM

## 2023-04-25 PROCEDURE — 1159F MED LIST DOCD IN RCRD: CPT | Mod: CPTII,95,, | Performed by: FAMILY MEDICINE

## 2023-04-25 PROCEDURE — 1160F RVW MEDS BY RX/DR IN RCRD: CPT | Mod: CPTII,95,, | Performed by: FAMILY MEDICINE

## 2023-04-25 PROCEDURE — 99214 OFFICE O/P EST MOD 30 MIN: CPT | Mod: 95,,, | Performed by: FAMILY MEDICINE

## 2023-04-25 PROCEDURE — 1160F PR REVIEW ALL MEDS BY PRESCRIBER/CLIN PHARMACIST DOCUMENTED: ICD-10-PCS | Mod: CPTII,95,, | Performed by: FAMILY MEDICINE

## 2023-04-25 PROCEDURE — 99214 PR OFFICE/OUTPT VISIT, EST, LEVL IV, 30-39 MIN: ICD-10-PCS | Mod: 95,,, | Performed by: FAMILY MEDICINE

## 2023-04-25 PROCEDURE — 1159F PR MEDICATION LIST DOCUMENTED IN MEDICAL RECORD: ICD-10-PCS | Mod: CPTII,95,, | Performed by: FAMILY MEDICINE

## 2023-04-25 RX ORDER — CIPROFLOXACIN 500 MG/1
500 TABLET ORAL 2 TIMES DAILY
Qty: 14 TABLET | Refills: 0 | Status: SHIPPED | OUTPATIENT
Start: 2023-04-25 | End: 2023-05-02

## 2023-04-25 NOTE — PROGRESS NOTES
Subjective     Patient ID: Cheryle Olivares is a 53 y.o. female.    Chief Complaint: Urinary Tract Infection  The patient location is:  [place]  The chief complaint leading to consultation is:  UTI    Visit type: audiovisual    Face to Face time with patient:  5 minutes  12 minutes of total time spent on the encounter, which includes face to face time and non-face to face time preparing to see the patient (eg, review of tests), Obtaining and/or reviewing separately obtained history, Documenting clinical information in the electronic or other health record, Independently interpreting results (not separately reported) and communicating results to the patient/family/caregiver, or Care coordination (not separately reported).         Each patient to whom he or she provides medical services by telemedicine is:  (1) informed of the relationship between the physician and patient and the respective role of any other health care provider with respect to management of the patient; and (2) notified that he or she may decline to receive medical services by telemedicine and may withdraw from such care at any time.    Notes:  53-year-old white female patient of Dr. Trujillo but new patient to me is evaluated through telemedicine visit secondary to concerns she reports increased fatigue, nausea, pelvic pressure, urinary frequency, urinary urgency, and discomfort with urination for the past 2 days.  He took an at home UTI test and it returned positive.  She has not taken any medication for these symptoms.    Urinary Tract Infection   Associated symptoms include behavior changes, flank pain, frequency, hesitancy, nausea and urgency. Pertinent negatives include no chills, discharge, hematuria, possible pregnancy, sweats, vomiting, weight loss, constipation, rash or withholding. Her past medical history is significant for catheterization, hypertension and kidney stones. There is no history of diabetes insipidus, diabetes mellitus,  genitourinary reflux, recurrent UTIs, a single kidney, STD, urinary stasis or a urological procedure.   Dysuria   This is a new problem. The current episode started in the past 7 days. The problem occurs intermittently. The problem has been gradually worsening. The quality of the pain is described as aching, burning and stabbing. The pain is at a severity of 4/10. The pain is mild. There has been no fever. She is Sexually active. There is No history of pyelonephritis. Associated symptoms include behavior changes, flank pain, frequency, hesitancy, nausea and urgency. Pertinent negatives include no chills, discharge, hematuria, possible pregnancy, sweats, vomiting, weight loss, constipation, rash or withholding. Her past medical history is significant for catheterization, hypertension and kidney stones. There is no history of diabetes insipidus, diabetes mellitus, genitourinary reflux, recurrent UTIs, a single kidney, STD, urinary stasis or a urological procedure.   Review of Systems   Constitutional:  Positive for appetite change and fatigue. Negative for chills, fever and weight loss.   HENT:  Negative for nasal congestion, ear pain, hearing loss, postnasal drip, rhinorrhea, sinus pressure/congestion, sore throat and tinnitus.    Eyes:  Negative for redness, itching and visual disturbance.   Respiratory:  Negative for cough, chest tightness and shortness of breath.    Cardiovascular:  Negative for chest pain and palpitations.   Gastrointestinal:  Positive for nausea. Negative for abdominal pain, constipation, diarrhea and vomiting.   Genitourinary:  Positive for dysuria, flank pain, frequency, hesitancy and urgency. Negative for decreased urine volume, difficulty urinating and hematuria.   Musculoskeletal:  Negative for back pain, myalgias, neck pain and neck stiffness.   Integumentary:  Negative for rash.   Neurological:  Negative for dizziness, light-headedness and headaches.   Psychiatric/Behavioral:  Positive  for sleep disturbance.         Objective     Physical Exam  Constitutional:       Appearance: Normal appearance.   HENT:      Head: Normocephalic and atraumatic.   Pulmonary:      Effort: Pulmonary effort is normal.   Neurological:      Mental Status: She is alert and oriented to person, place, and time.   Psychiatric:         Mood and Affect: Mood normal.         Behavior: Behavior normal.         Thought Content: Thought content normal.         Judgment: Judgment normal.          Assessment and Plan     Problem List Items Addressed This Visit    None  Visit Diagnoses       Urinary tract infection without hematuria, site unspecified    -  Primary    Relevant Medications    ciprofloxacin HCl (CIPRO) 500 MG tablet            1. Cipro 500 mg b.i.d. for 7 days.  2. Encourage increased hydration.  3. Follow-up as needed if symptoms persist or worsen.

## 2023-05-04 ENCOUNTER — TELEPHONE (OUTPATIENT)
Dept: NEUROLOGY | Facility: CLINIC | Age: 53
End: 2023-05-04
Payer: COMMERCIAL

## 2023-05-04 ENCOUNTER — PROCEDURE VISIT (OUTPATIENT)
Dept: NEUROLOGY | Facility: CLINIC | Age: 53
End: 2023-05-04
Payer: COMMERCIAL

## 2023-05-04 VITALS
BODY MASS INDEX: 30.11 KG/M2 | WEIGHT: 176.38 LBS | HEART RATE: 75 BPM | HEIGHT: 64 IN | SYSTOLIC BLOOD PRESSURE: 123 MMHG | DIASTOLIC BLOOD PRESSURE: 77 MMHG

## 2023-05-04 DIAGNOSIS — G43.019 INTRACTABLE MIGRAINE WITHOUT AURA AND WITHOUT STATUS MIGRAINOSUS: ICD-10-CM

## 2023-05-04 DIAGNOSIS — G43.719 INTRACTABLE CHRONIC MIGRAINE WITHOUT AURA AND WITHOUT STATUS MIGRAINOSUS: Primary | ICD-10-CM

## 2023-05-04 PROCEDURE — 64615 CHEMODENERV MUSC MIGRAINE: CPT | Mod: S$GLB,,, | Performed by: PSYCHIATRY & NEUROLOGY

## 2023-05-04 PROCEDURE — 64615 PR CHEMODENERVATION OF MUSCLE FOR CHRONIC MIGRAINE: ICD-10-PCS | Mod: S$GLB,,, | Performed by: PSYCHIATRY & NEUROLOGY

## 2023-06-29 ENCOUNTER — PATIENT MESSAGE (OUTPATIENT)
Dept: OPTOMETRY | Facility: CLINIC | Age: 53
End: 2023-06-29
Payer: COMMERCIAL

## 2023-07-12 ENCOUNTER — PATIENT MESSAGE (OUTPATIENT)
Dept: PSYCHIATRY | Facility: CLINIC | Age: 53
End: 2023-07-12
Payer: COMMERCIAL

## 2023-07-27 ENCOUNTER — PROCEDURE VISIT (OUTPATIENT)
Dept: NEUROLOGY | Facility: CLINIC | Age: 53
End: 2023-07-27
Payer: COMMERCIAL

## 2023-07-27 VITALS
DIASTOLIC BLOOD PRESSURE: 73 MMHG | HEIGHT: 64 IN | WEIGHT: 178.69 LBS | SYSTOLIC BLOOD PRESSURE: 135 MMHG | HEART RATE: 76 BPM | BODY MASS INDEX: 30.51 KG/M2

## 2023-07-27 DIAGNOSIS — G43.719 INTRACTABLE CHRONIC MIGRAINE WITHOUT AURA AND WITHOUT STATUS MIGRAINOSUS: Primary | ICD-10-CM

## 2023-07-27 DIAGNOSIS — G43.019 INTRACTABLE MIGRAINE WITHOUT AURA AND WITHOUT STATUS MIGRAINOSUS: ICD-10-CM

## 2023-07-27 PROCEDURE — 64615 PR CHEMODENERVATION OF MUSCLE FOR CHRONIC MIGRAINE: ICD-10-PCS | Mod: S$GLB,,, | Performed by: PSYCHIATRY & NEUROLOGY

## 2023-07-27 PROCEDURE — 64615 CHEMODENERV MUSC MIGRAINE: CPT | Mod: S$GLB,,, | Performed by: PSYCHIATRY & NEUROLOGY

## 2023-08-22 ENCOUNTER — OFFICE VISIT (OUTPATIENT)
Dept: PSYCHIATRY | Facility: CLINIC | Age: 53
End: 2023-08-22
Payer: COMMERCIAL

## 2023-08-22 DIAGNOSIS — Z63.0 MARITAL OR PARTNER RELATIONAL PROBLEM: ICD-10-CM

## 2023-08-22 DIAGNOSIS — F41.1 GAD (GENERALIZED ANXIETY DISORDER): Primary | ICD-10-CM

## 2023-08-22 PROCEDURE — 90791 PR PSYCHIATRIC DIAGNOSTIC EVALUATION: ICD-10-PCS | Mod: 95,,, | Performed by: PSYCHOLOGIST

## 2023-08-22 PROCEDURE — 90791 PSYCH DIAGNOSTIC EVALUATION: CPT | Mod: 95,,, | Performed by: PSYCHOLOGIST

## 2023-08-22 SDOH — SOCIAL DETERMINANTS OF HEALTH (SDOH): PROBLEMS IN RELATIONSHIP WITH SPOUSE OR PARTNER: Z63.0
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
## 2023-08-27 NOTE — PROCEDURES
Follow up:   Overall doing better     Prior note:   Trigger for migraine - stress   Relieving factor  - exercise      Prior note:   Now has severe migraine 2/month   ubrelvy - partial relief     Prior note:   Overall doing much better   Feels BOTOX wore off 1 week ago     Prior note:   Overall unchanged   Still has nearly daily HAs  Ubrelvy  is effective      Prior note:   S/p PT, dry needling (SCM, occipitalis, trap) - very effective (but lasts only few days)   Clenched teeth - saw dentist, no solutions   HA are now 3/week. Also has days of CDH. Imitrex helps       Prior note:   Base of skull pain at early AM hours - radiates to front + dizziness      PT with dry needling helps when she has clusters of headaches   Stress is a trigger   Still doing yoga, meditation   Using mouth guard      Also reports throat 'spasms'. No benefit from cingulair      Taking Imitrex nearly daily      3/4/20 e-mail: Hello! First, I want to report that I'm still making amazing progress in reducing and almost eliminating my migraines! Physical therapy and stress reduction have been keys. I was recently able to add everyday exercise back into my routine, with few headaches afterward. This is miraculous! I have gone through all of my prescribed PT sessions, and I don't feel I need to continue on a regular basis. However, in order to continue to be physically active, both with at-home PT exercises and regular fitness (running, walking, yoga), it would be helpful to have PT every 2-3 weeks for awhile, as my muscles continue to adjust to better posture and more effective functioning. Is it possible to prescribe additional PT at less frequency? Thank you so much!     Prior note:   Stress -> Neck pain -> HA + nausea   overactivation of trap      Prior note:   Oct 2014 - diag with Hemicran cont (L) - pain points on the L side + photo/phono  Indomethacin 25mg PRN - helped   Currently 4/week Hz      Headache history:   Age of onset -  Childhood    Location - R or L   Nature of pain -  Throbbing   Prodrome - no   Aura -  No   Duration of headache - > 4 hrs   Time to peak intensity - hrs   Pain scale - range of intensity -  8/10  Frequency -  2-3/month   Status Migrainosus history - yes   Time of day of most headaches- anytime      Associated symptoms with the headache:   Meningeal symptoms - photophobia, phonophobia, exercise intolerance +   Nausea/vomitting+   Nasal drainage   Visual blurriness   Pallor/flushing  Dizziness +   Vertigo  Confusion  Impaired concentration +   Pain worsened with physical activity +   Neck pain   Cheek pain on R +      Cluster headache symptoms: none      Symptoms of increased intracranial pressure: none      Basilar migraine symptoms: none      Headache Triggers:   Stress +   Bright or flickering light  Strong smell  Vigorous exercise  Dietary factors   Visual strain   Weather changes +   Exertion  Heat (hot weather, hot baths or showers)  Menses     Other comorbid conditions:  Anxiety - yes   Motion sickness symptom - no      Treatment history:  Resolution of headache with sleep - yes   Meds tried:  H/o Renal stones   Fioricet, relpax, xanax   Flexeril   Medrol dose   maxalt - sluggish   trudhesa -feeling weird   Antiviral - not help   accupressure - helped   accupunture - helped   PT - helps   estradiol 0.05 mg/24 hr td ptsw (VIVELLE-DOT) 0.05 mg/24 hr  - twice weekly patch - helps with hot flashes, now on daily pill   Topamax 25 mg - not help   Gabapentin 100 mg  - drowsy, not help    mobic - helped   BOTOX#1 1/31/22 - dramatic improvement in HA   BOTOX#2 8/4/22 - dramatic improvement in HA    BOTOX#3 8/4/22 - dramatic improvement in HA    BOTOX#4 11/3/22 - dramatic improvement in HA    BOTOX#5 2/2/23 - dramatic improvement in HA    BOTOX#6 5/4/23 - dramatic improvement in HA     Headache risk factors:   H/o TBI  - no   H/o Meningitis  - no   F/h Aneurysms  - no     Headache burden:   In the last three months:  Days missed  from work = several   Days unable to perform activities of daily living = 0  Days unable to attend social activities = several       Review of Systems   Constitutional: Negative.    HENT: Negative.    Eyes: Negative.    Respiratory: Negative.    Cardiovascular: Negative.    Gastrointestinal: Negative.    Endocrine: Negative.    Genitourinary: Negative.    Musculoskeletal: neck pain.    Skin: Negative.    Allergic/Immunologic: Negative.    Neurological: Negative.    Hematological: Negative.    Psychiatric/Behavioral: anxiety.        Objective:   Physical Exam   Constitutional: She is oriented to person, place, and time. She appears well-developed and well-nourished.   Psychiatric: She has a normal mood and affect. Her behavior is normal. Judgment and thought content normal.       Assessment:       1. Intractable chronic migraine without aura   2. Myofacial pain (pressure points)- masseter, neck   3. Tonsillar hernia into foramen magnum      Her HA are unlikely from a TAC and more likely from her tonsillar herniation and posture      Neck and upper chest - vibrating sensation      MRI C spine  Impression       No focal disk abnormalities or significant central canal or neural foraminal stenosis.  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    2-3 punctate foci of T2/flair signal hyperintensity frontal subcortical white matter which are nonspecific and of uncertain clinical significance.    Otherwise unremarkable MRI brain without evidence for acute infarction or enhancing lesion.    Electronically signed by: TRISH GOFF DO  Date: 10/24/14  Time: 14:27       Degenerative findings:   C2-C3: No spinal canal stenosis or neural foraminal narrowing.   C3-C4: Posterior disc osteophyte complex and left facet joint arthropathy resulting in mild left-sided neural  foraminal narrowing.  No spinal canal stenosis.   C4-C5: Posterior disc osteophyte complex with no significant spinal canal stenosis or neural foraminal narrowing.   C5-C6: Posterior disc osteophyte complex with mild spinal canal stenosis.  No neural foraminal narrowing.   C6-C7: Posterior disc osteophyte complex with no significant spinal canal stenosis or neural foraminal narrowing.   C7-T1: No spinal canal stenosis or neural foraminal narrowing.  Redemonstration perineural cysts within the proximal extraforaminal space bilaterally at this level which possibly compressing on the exiting nerve roots.   Paraspinal muscles & soft tissues: Unremarkable.     Impression:   1. Redemonstration of perineural cysts bilaterally at the level of C7-T1, as above.  2. No significant spinal canal stenosis or neural foraminal narrowing throughout the cervical spine, as above.     Electronically signed by resident: Macho Boone  Date:                                            08/20/2020  Time:                                           09:29     Electronically signed by: Mauricio Pitt MD  Date:                                            08/20/2020  Time:                                           09:55      Results for LAISHA HUMPHREY (MRN [id number]) as of 10/11/2019 09:44    Ref. Range 2/5/2019 10:19   Vitamin B-12 Latest Ref Range: 210 - 950 pg/mL >2000 (H)   Thiamine Latest Ref Range: 38 - 122 ug/L 45   Vit D, 25-Hydroxy Latest Ref Range: 30 - 96 ng/mL 75   Vitamin B2 Latest Ref Range: 1 - 19 mcg/L 18   Vitamin B6 Latest Ref Range: 5 - 50 ug/L 131 (H)   Antigliadin Ab IgG Latest Ref Range: <20 UNITS 2   Antigliadin Abs, IgA Latest Ref Range: <20 UNITS 7   TTG IgA Latest Ref Range: <20 UNITS 9   TTG IgG Latest Ref Range: <20 UNITS 3   Immunoglobulin A (IgA) Latest Ref Range: 70 - 400 mg/dL 172      Plan:   cont estrogen, Singulair, amlodipine   Breakthrough headache - imitrex 100 mg, xanax, mobic, ubrelvy, pain cream    Dentist - Floyd Erickson ([facility])     BOTOX treatment response:   Prior to initiating BOTOX, the patient had  24  migraine days per month on average. This meets criteria for chronic migraine.   After starting BOTOX, the patient experienced a reduction in  21  days per month   After starting BOTOX, the patient experienced a reduction in > 100 hours of migraine symptoms per month   After starting BOTOX, the patient experienced a 50% reduction in burden of migraine days per month   Based on this information, BOTOX is medically necessary for the management of the patient's chronic migraine.     BOTOX Injection intervals:   Patient reports a 'wearing off effect' prior to the subsequent BOTOX injections at 12 weeks. This occurs at week 10  Symptoms of this a 'wearing off effect' include - worsening of migraine headache frequency and intensity   Medications used during the 'wearing off effect' period include ubrelvy  Based on this information, it is medically necessary for the patient to receive BOTOX therapy at an interval of every 10 weeks for the management of chronic migraine.     BOTOX was performed as an indicated therapy for intractable chronic migraine headaches given that the patient failed > 2 prophylactic medications     Botulinum Toxin Injection Procedure   Pre-operative diagnosis: Chronic migraine   Post-operative diagnosis: Same   Procedure: Chemical neurolysis   After risks and benefits were explained including bleeding, infection, worsening of pain, damage to the areas being injected, weakness of muscles, loss of muscle control, dysphagia if injecting the head or neck, facial droop if injecting the facial area, painful injection, allergic or other reaction to the medications being injected, and the failure of the procedure to help the problem, a signed consent was obtained.   The patient was placed in a comfortable area and the sites to be treated were identified.The area to be treated was prepped three  times with alcohol and the alcohol allowed to dry. Next, a 30 gauge needle was used to inject the medication in the area to be treated.   Area(s) injected:   Total Botox used: 175 Units   Botox wastage: 25 Units   Injection sites:    muscle bilaterally ( a total of 10 units divided into 2 sites)   Procerus muscle (5 units)   Frontalis muscle bilaterally (a total of 20 units divided into 4 sites)   Temporalis muscle bilaterally (a total of 40 units divided into 8 sites)   Occipitalis muscle bilaterally (a total of 30 units divided into 6 sites)   Cervical paraspinal muscles (a total of 20 units divided into 4 sites)   Trapezius muscle bilaterally (a total of 30 units divided into 6 sites)  Masseter 5 units bel    Mastoid 5 units   Complications: none   RTC for the next Botox injection: 10 weeks          Patient verbalized understanding to plan. I answered all her questions to her satisfaction    Procedures

## 2023-09-25 ENCOUNTER — TELEPHONE (OUTPATIENT)
Dept: NEUROLOGY | Facility: CLINIC | Age: 53
End: 2023-09-25
Payer: COMMERCIAL

## 2023-09-25 NOTE — TELEPHONE ENCOUNTER
Called patient to explain we cannot move her botox any sooner than 10 weeks due to insurance authorizing the procedure.

## 2023-09-25 NOTE — TELEPHONE ENCOUNTER
----- Message from Lynne Howard sent at 9/25/2023  2:37 PM CDT -----  Regarding: appt access  Contact: pt [phone]  Pt calling to inquire if there are any sooner appts. If not, she will keep the 10/06. Pls call

## 2023-10-06 ENCOUNTER — PROCEDURE VISIT (OUTPATIENT)
Dept: NEUROLOGY | Facility: CLINIC | Age: 53
End: 2023-10-06
Payer: COMMERCIAL

## 2023-10-06 VITALS
SYSTOLIC BLOOD PRESSURE: 111 MMHG | HEART RATE: 76 BPM | BODY MASS INDEX: 30.39 KG/M2 | DIASTOLIC BLOOD PRESSURE: 75 MMHG | WEIGHT: 178 LBS | HEIGHT: 64 IN

## 2023-10-06 DIAGNOSIS — G43.719 INTRACTABLE CHRONIC MIGRAINE WITHOUT AURA AND WITHOUT STATUS MIGRAINOSUS: Primary | ICD-10-CM

## 2023-10-06 PROCEDURE — 64615 PR CHEMODENERVATION OF MUSCLE FOR CHRONIC MIGRAINE: ICD-10-PCS | Mod: S$GLB,,, | Performed by: PSYCHIATRY & NEUROLOGY

## 2023-10-06 PROCEDURE — 64615 CHEMODENERV MUSC MIGRAINE: CPT | Mod: S$GLB,,, | Performed by: PSYCHIATRY & NEUROLOGY

## 2023-10-06 NOTE — PROCEDURES
Follow up:   Overall doing better     Prior note:   Trigger for migraine - stress   Relieving factor  - exercise      Prior note:   Now has severe migraine 2/month   ubrelvy - partial relief     Prior note:   Overall doing much better   Feels BOTOX wore off 1 week ago     Prior note:   Overall unchanged   Still has nearly daily HAs  Ubrelvy  is effective      Prior note:   S/p PT, dry needling (SCM, occipitalis, trap) - very effective (but lasts only few days)   Clenched teeth - saw dentist, no solutions   HA are now 3/week. Also has days of CDH. Imitrex helps       Prior note:   Base of skull pain at early AM hours - radiates to front + dizziness      PT with dry needling helps when she has clusters of headaches   Stress is a trigger   Still doing yoga, meditation   Using mouth guard      Also reports throat 'spasms'. No benefit from cingulair      Taking Imitrex nearly daily      3/4/20 e-mail: Hello! First, I want to report that I'm still making amazing progress in reducing and almost eliminating my migraines! Physical therapy and stress reduction have been keys. I was recently able to add everyday exercise back into my routine, with few headaches afterward. This is miraculous! I have gone through all of my prescribed PT sessions, and I don't feel I need to continue on a regular basis. However, in order to continue to be physically active, both with at-home PT exercises and regular fitness (running, walking, yoga), it would be helpful to have PT every 2-3 weeks for awhile, as my muscles continue to adjust to better posture and more effective functioning. Is it possible to prescribe additional PT at less frequency? Thank you so much!     Prior note:   Stress -> Neck pain -> HA + nausea   overactivation of trap      Prior note:   Oct 2014 - diag with Hemicran cont (L) - pain points on the L side + photo/phono  Indomethacin 25mg PRN - helped   Currently 4/week Hz      Headache history:   Age of onset -  Childhood    Location - R or L   Nature of pain -  Throbbing   Prodrome - no   Aura -  No   Duration of headache - > 4 hrs   Time to peak intensity - hrs   Pain scale - range of intensity -  8/10  Frequency -  2-3/month   Status Migrainosus history - yes   Time of day of most headaches- anytime      Associated symptoms with the headache:   Meningeal symptoms - photophobia, phonophobia, exercise intolerance +   Nausea/vomitting+   Nasal drainage   Visual blurriness   Pallor/flushing  Dizziness +   Vertigo  Confusion  Impaired concentration +   Pain worsened with physical activity +   Neck pain   Cheek pain on R +      Cluster headache symptoms: none      Symptoms of increased intracranial pressure: none      Basilar migraine symptoms: none      Headache Triggers:   Stress +   Bright or flickering light  Strong smell  Vigorous exercise  Dietary factors   Visual strain   Weather changes +   Exertion  Heat (hot weather, hot baths or showers)  Menses     Other comorbid conditions:  Anxiety - yes   Motion sickness symptom - no      Treatment history:  Resolution of headache with sleep - yes   Meds tried:  H/o Renal stones   Fioricet, relpax, xanax   Flexeril   Medrol dose   maxalt - sluggish   trudhesa -feeling weird   Antiviral - not help   accupressure - helped   accupunture - helped   PT - helps   estradiol 0.05 mg/24 hr td ptsw (VIVELLE-DOT) 0.05 mg/24 hr  - twice weekly patch - helps with hot flashes, now on daily pill   Topamax 25 mg - not help   Gabapentin 100 mg  - drowsy, not help    mobic - helped   BOTOX#1 1/31/22 - dramatic improvement in HA   BOTOX#2 8/4/22 - dramatic improvement in HA    BOTOX#3 8/4/22 - dramatic improvement in HA    BOTOX#4 11/3/22 - dramatic improvement in HA    BOTOX#5 2/2/23 - dramatic improvement in HA    BOTOX#6 5/4/23 - dramatic improvement in HA    BOTOX#7 7/27/23 - dramatic improvement in HA, very mild ptosis     Headache risk factors:   H/o TBI  - no   H/o Meningitis  - no   F/h Aneurysms  -  no     Headache burden:   In the last three months:  Days missed from work = several   Days unable to perform activities of daily living = 0  Days unable to attend social activities = several       Review of Systems   Constitutional: Negative.    HENT: Negative.    Eyes: Negative.    Respiratory: Negative.    Cardiovascular: Negative.    Gastrointestinal: Negative.    Endocrine: Negative.    Genitourinary: Negative.    Musculoskeletal: neck pain.    Skin: Negative.    Allergic/Immunologic: Negative.    Neurological: Negative.    Hematological: Negative.    Psychiatric/Behavioral: anxiety.        Objective:   Physical Exam   Constitutional: She is oriented to person, place, and time. She appears well-developed and well-nourished.   Psychiatric: She has a normal mood and affect. Her behavior is normal. Judgment and thought content normal.       Assessment:       1. Intractable chronic migraine without aura   2. Myofacial pain (pressure points)- masseter, neck   3. Tonsillar hernia into foramen magnum      Her HA are unlikely from a TAC and more likely from her tonsillar herniation and posture      Neck and upper chest - vibrating sensation      MRI C spine  Impression       No focal disk abnormalities or significant central canal or neural foraminal stenosis.  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    2-3 punctate foci of T2/flair signal hyperintensity frontal subcortical white matter which are nonspecific and of uncertain clinical significance.    Otherwise unremarkable MRI brain without evidence for acute infarction or enhancing lesion.    Electronically signed by: TRISH GOFF DO  Date: 10/24/14  Time: 14:27       Degenerative findings:   C2-C3: No spinal canal stenosis or neural foraminal narrowing.   C3-C4: Posterior disc osteophyte complex and left  facet joint arthropathy resulting in mild left-sided neural foraminal narrowing.  No spinal canal stenosis.   C4-C5: Posterior disc osteophyte complex with no significant spinal canal stenosis or neural foraminal narrowing.   C5-C6: Posterior disc osteophyte complex with mild spinal canal stenosis.  No neural foraminal narrowing.   C6-C7: Posterior disc osteophyte complex with no significant spinal canal stenosis or neural foraminal narrowing.   C7-T1: No spinal canal stenosis or neural foraminal narrowing.  Redemonstration perineural cysts within the proximal extraforaminal space bilaterally at this level which possibly compressing on the exiting nerve roots.   Paraspinal muscles & soft tissues: Unremarkable.     Impression:   1. Redemonstration of perineural cysts bilaterally at the level of C7-T1, as above.  2. No significant spinal canal stenosis or neural foraminal narrowing throughout the cervical spine, as above.     Electronically signed by resident: Macho Boone  Date:                                            08/20/2020  Time:                                           09:29     Electronically signed by: Mauricio Pitt MD  Date:                                            08/20/2020  Time:                                           09:55      Results for MILAGRO LAISHA BAE (MRN [id number]) as of 10/11/2019 09:44    Ref. Range 2/5/2019 10:19   Vitamin B-12 Latest Ref Range: 210 - 950 pg/mL >2000 (H)   Thiamine Latest Ref Range: 38 - 122 ug/L 45   Vit D, 25-Hydroxy Latest Ref Range: 30 - 96 ng/mL 75   Vitamin B2 Latest Ref Range: 1 - 19 mcg/L 18   Vitamin B6 Latest Ref Range: 5 - 50 ug/L 131 (H)   Antigliadin Ab IgG Latest Ref Range: <20 UNITS 2   Antigliadin Abs, IgA Latest Ref Range: <20 UNITS 7   TTG IgA Latest Ref Range: <20 UNITS 9   TTG IgG Latest Ref Range: <20 UNITS 3   Immunoglobulin A (IgA) Latest Ref Range: 70 - 400 mg/dL 172      Plan:   cont estrogen, Singulair, amlodipine   Breakthrough  headache - imitrex 100 mg, xanax, mobic, ubrelvy, pain cream   Dentist - Floyd Erickson ([facility])     BOTOX treatment response:   Prior to initiating BOTOX, the patient had  24  migraine days per month on average. This meets criteria for chronic migraine.   After starting BOTOX, the patient experienced a reduction in  21  days per month   After starting BOTOX, the patient experienced a reduction in > 100 hours of migraine symptoms per month   After starting BOTOX, the patient experienced a 50% reduction in burden of migraine days per month   Based on this information, BOTOX is medically necessary for the management of the patient's chronic migraine.     BOTOX Injection intervals:   Patient reports a 'wearing off effect' prior to the subsequent BOTOX injections at 12 weeks. This occurs at week 10  Symptoms of this a 'wearing off effect' include - worsening of migraine headache frequency and intensity   Medications used during the 'wearing off effect' period include ubrelvy  Based on this information, it is medically necessary for the patient to receive BOTOX therapy at an interval of every 10 weeks for the management of chronic migraine.     BOTOX was performed as an indicated therapy for intractable chronic migraine headaches given that the patient failed > 2 prophylactic medications     Botulinum Toxin Injection Procedure   Pre-operative diagnosis: Chronic migraine   Post-operative diagnosis: Same   Procedure: Chemical neurolysis   After risks and benefits were explained including bleeding, infection, worsening of pain, damage to the areas being injected, weakness of muscles, loss of muscle control, dysphagia if injecting the head or neck, facial droop if injecting the facial area, painful injection, allergic or other reaction to the medications being injected, and the failure of the procedure to help the problem, a signed consent was obtained.   The patient was placed in a comfortable area and the sites to be  treated were identified.The area to be treated was prepped three times with alcohol and the alcohol allowed to dry. Next, a 30 gauge needle was used to inject the medication in the area to be treated.   Area(s) injected:   Total Botox used: 175 Units   Botox wastage: 25 Units   Injection sites:    muscle bilaterally ( a total of 5 units divided into 2 sites)   Procerus muscle (5 units)   Frontalis muscle bilaterally (a total of 20 units divided into 4 sites)   Temporalis muscle bilaterally (a total of 40 units divided into 8 sites)   Occipitalis muscle bilaterally (a total of 30 units divided into 6 sites)   Cervical paraspinal muscles (a total of 20 units divided into 4 sites)   Trapezius muscle bilaterally (a total of 30 units divided into 6 sites)  Masseter 5 units bel    Mastoid 5 units   Complications: none   RTC for the next Botox injection: 10 weeks          Patient verbalized understanding to plan. I answered all her questions to her satisfaction    Procedures

## 2023-10-19 ENCOUNTER — PATIENT MESSAGE (OUTPATIENT)
Dept: PSYCHIATRY | Facility: CLINIC | Age: 53
End: 2023-10-19

## 2023-10-19 ENCOUNTER — OFFICE VISIT (OUTPATIENT)
Dept: PSYCHIATRY | Facility: CLINIC | Age: 53
End: 2023-10-19
Payer: COMMERCIAL

## 2023-10-19 DIAGNOSIS — F41.1 GAD (GENERALIZED ANXIETY DISORDER): Primary | ICD-10-CM

## 2023-10-19 DIAGNOSIS — Z63.0 MARITAL OR PARTNER RELATIONAL PROBLEM: ICD-10-CM

## 2023-10-19 PROCEDURE — 90837 PSYTX W PT 60 MINUTES: CPT | Mod: 95,,, | Performed by: PSYCHOLOGIST

## 2023-10-19 PROCEDURE — 90837 PR PSYCHOTHERAPY W/PATIENT, 60 MIN: ICD-10-PCS | Mod: 95,,, | Performed by: PSYCHOLOGIST

## 2023-10-19 SDOH — SOCIAL DETERMINANTS OF HEALTH (SDOH): PROBLEMS IN RELATIONSHIP WITH SPOUSE OR PARTNER: Z63.0

## 2023-10-19 NOTE — PROGRESS NOTES
Individual Psychotherapy (PhD/LCSW)    10/19/2023    Therapeutic Intervention: Met with patient.  Outpatient - Behavior modifying psychotherapy 60 min - CPT code 20859    The patient location is: Patient's home/ Patient reported that her location at the time of this visit was in the [facility]     Visit type: Virtual visit with synchronous audio and video     Each patient to whom he or she provides medical services by telemedicine is: (1) informed of the relationship between the physician and patient and the respective role of any other health care provider with respect to management of the patient; and (2) notified that he or she may decline to receive medical services by telemedicine and may withdraw from such care at any time.     Chief complaint/reason for encounter: anxiety and interpersonal     Interval history and content of current session: Patient presented casually dressed, alert, and oriented.  Patient reported experiencing  insomnia, altered libido, social isolation, excessive anxiety/worry, restlessness/keyed up, and muscle tension.  Patient denied current suicidal and homicidal ideations.  Explored triggers of patient's anxiety.  Active listening skills were used as patient described increased stress which has been negatively affecting her sleep.  Patient was educated about appropriate sleep hygiene including avoiding the use of electronics about two hours prior to bedtime, exercising, limiting caffeine use, and avoiding daytime naps.  Patient was taught behavioral coping strategies such as physical exercise, increased social involvement, relaxation skills, sharing of feelings, and writing in a journal as ways to reduce feelings of anxiety.  Patient was informed that current provider will be out of the clinic for about eight weeks starting 11/21/23. Discussed plan for patient's care during provider's absence.                            Treatment plan:  Target symptoms: anxiety ,  interpersonal  Why chosen therapy is appropriate versus another modality: relevant to diagnosis  Outcome monitoring methods: self-report  Therapeutic intervention type: insight oriented psychotherapy, behavior modifying psychotherapy    Risk parameters:  Patient reports no suicidal ideation  Patient reports no homicidal ideation  Patient reports no self-injurious behavior  Patient reports no violent behavior    Verbal deficits: None    Patient's response to intervention:  The patient's response to intervention is accepting.    Progress toward goals and other mental status changes:  The patient's progress toward goals is fair .    Diagnosis:     ICD-10-CM ICD-9-CM   1. SILVERIO (generalized anxiety disorder)  F41.1 300.02   2. Marital or partner relational problem  Z63.0 V61.10       Plan:  individual psychotherapy, family psychotherapy, and consult psychiatrist for medication evaluation    Return to clinic: 3 weeks, 1 month    Length of Service (minutes): 60

## 2023-10-26 ENCOUNTER — PATIENT MESSAGE (OUTPATIENT)
Dept: PSYCHIATRY | Facility: CLINIC | Age: 53
End: 2023-10-26
Payer: COMMERCIAL

## 2023-11-06 ENCOUNTER — PATIENT MESSAGE (OUTPATIENT)
Dept: PSYCHIATRY | Facility: CLINIC | Age: 53
End: 2023-11-06
Payer: COMMERCIAL

## 2023-12-01 ENCOUNTER — PATIENT MESSAGE (OUTPATIENT)
Dept: NEUROLOGY | Facility: CLINIC | Age: 53
End: 2023-12-01
Payer: COMMERCIAL

## 2023-12-07 ENCOUNTER — PATIENT MESSAGE (OUTPATIENT)
Dept: PRIMARY CARE CLINIC | Facility: CLINIC | Age: 53
End: 2023-12-07
Payer: COMMERCIAL

## 2023-12-08 ENCOUNTER — OFFICE VISIT (OUTPATIENT)
Dept: PRIMARY CARE CLINIC | Facility: CLINIC | Age: 53
End: 2023-12-08
Payer: COMMERCIAL

## 2023-12-08 ENCOUNTER — LAB VISIT (OUTPATIENT)
Dept: LAB | Facility: HOSPITAL | Age: 53
End: 2023-12-08
Attending: STUDENT IN AN ORGANIZED HEALTH CARE EDUCATION/TRAINING PROGRAM
Payer: COMMERCIAL

## 2023-12-08 ENCOUNTER — PATIENT MESSAGE (OUTPATIENT)
Dept: PRIMARY CARE CLINIC | Facility: CLINIC | Age: 53
End: 2023-12-08

## 2023-12-08 VITALS
OXYGEN SATURATION: 98 % | HEART RATE: 73 BPM | TEMPERATURE: 98 F | WEIGHT: 198.88 LBS | DIASTOLIC BLOOD PRESSURE: 86 MMHG | HEIGHT: 64 IN | BODY MASS INDEX: 33.95 KG/M2 | SYSTOLIC BLOOD PRESSURE: 124 MMHG

## 2023-12-08 DIAGNOSIS — R10.32 LLQ CRAMPING: ICD-10-CM

## 2023-12-08 DIAGNOSIS — K90.41 NON-CELIAC GLUTEN SENSITIVITY: ICD-10-CM

## 2023-12-08 DIAGNOSIS — R10.32 LEFT LOWER QUADRANT PAIN: ICD-10-CM

## 2023-12-08 DIAGNOSIS — K59.00 CONSTIPATION, UNSPECIFIED CONSTIPATION TYPE: ICD-10-CM

## 2023-12-08 DIAGNOSIS — R11.2 NAUSEA AND VOMITING, UNSPECIFIED VOMITING TYPE: ICD-10-CM

## 2023-12-08 DIAGNOSIS — R14.0 ABDOMINAL BLOATING: ICD-10-CM

## 2023-12-08 DIAGNOSIS — R10.30 LOWER ABDOMINAL PAIN: ICD-10-CM

## 2023-12-08 DIAGNOSIS — R14.0 ABDOMINAL BLOATING: Primary | ICD-10-CM

## 2023-12-08 LAB
ALBUMIN SERPL BCP-MCNC: 4.2 G/DL (ref 3.5–5.2)
ALP SERPL-CCNC: 66 U/L (ref 55–135)
ALT SERPL W/O P-5'-P-CCNC: 23 U/L (ref 10–44)
ANION GAP SERPL CALC-SCNC: 9 MMOL/L (ref 8–16)
AST SERPL-CCNC: 30 U/L (ref 10–40)
BILIRUB SERPL-MCNC: 0.7 MG/DL (ref 0.1–1)
BUN SERPL-MCNC: 10 MG/DL (ref 6–20)
CALCIUM SERPL-MCNC: 9.3 MG/DL (ref 8.7–10.5)
CHLORIDE SERPL-SCNC: 105 MMOL/L (ref 95–110)
CO2 SERPL-SCNC: 28 MMOL/L (ref 23–29)
CREAT SERPL-MCNC: 0.8 MG/DL (ref 0.5–1.4)
EST. GFR  (NO RACE VARIABLE): >60 ML/MIN/1.73 M^2
GLUCOSE SERPL-MCNC: 88 MG/DL (ref 70–110)
HAV IGM SERPL QL IA: NORMAL
HBV CORE IGM SERPL QL IA: NORMAL
HBV SURFACE AG SERPL QL IA: NORMAL
HCV AB SERPL QL IA: NORMAL
LIPASE SERPL-CCNC: 15 U/L (ref 4–60)
POTASSIUM SERPL-SCNC: 3.8 MMOL/L (ref 3.5–5.1)
PROT SERPL-MCNC: 7.5 G/DL (ref 6–8.4)
SODIUM SERPL-SCNC: 142 MMOL/L (ref 136–145)

## 2023-12-08 PROCEDURE — 3008F PR BODY MASS INDEX (BMI) DOCUMENTED: ICD-10-PCS | Mod: CPTII,S$GLB,, | Performed by: STUDENT IN AN ORGANIZED HEALTH CARE EDUCATION/TRAINING PROGRAM

## 2023-12-08 PROCEDURE — 3079F DIAST BP 80-89 MM HG: CPT | Mod: CPTII,S$GLB,, | Performed by: STUDENT IN AN ORGANIZED HEALTH CARE EDUCATION/TRAINING PROGRAM

## 2023-12-08 PROCEDURE — 99214 OFFICE O/P EST MOD 30 MIN: CPT | Mod: S$GLB,,, | Performed by: STUDENT IN AN ORGANIZED HEALTH CARE EDUCATION/TRAINING PROGRAM

## 2023-12-08 PROCEDURE — 80053 COMPREHEN METABOLIC PANEL: CPT | Performed by: STUDENT IN AN ORGANIZED HEALTH CARE EDUCATION/TRAINING PROGRAM

## 2023-12-08 PROCEDURE — 3008F BODY MASS INDEX DOCD: CPT | Mod: CPTII,S$GLB,, | Performed by: STUDENT IN AN ORGANIZED HEALTH CARE EDUCATION/TRAINING PROGRAM

## 2023-12-08 PROCEDURE — 99214 PR OFFICE/OUTPT VISIT, EST, LEVL IV, 30-39 MIN: ICD-10-PCS | Mod: S$GLB,,, | Performed by: STUDENT IN AN ORGANIZED HEALTH CARE EDUCATION/TRAINING PROGRAM

## 2023-12-08 PROCEDURE — 80074 ACUTE HEPATITIS PANEL: CPT | Performed by: STUDENT IN AN ORGANIZED HEALTH CARE EDUCATION/TRAINING PROGRAM

## 2023-12-08 PROCEDURE — 3079F PR MOST RECENT DIASTOLIC BLOOD PRESSURE 80-89 MM HG: ICD-10-PCS | Mod: CPTII,S$GLB,, | Performed by: STUDENT IN AN ORGANIZED HEALTH CARE EDUCATION/TRAINING PROGRAM

## 2023-12-08 PROCEDURE — 3074F PR MOST RECENT SYSTOLIC BLOOD PRESSURE < 130 MM HG: ICD-10-PCS | Mod: CPTII,S$GLB,, | Performed by: STUDENT IN AN ORGANIZED HEALTH CARE EDUCATION/TRAINING PROGRAM

## 2023-12-08 PROCEDURE — 1160F PR REVIEW ALL MEDS BY PRESCRIBER/CLIN PHARMACIST DOCUMENTED: ICD-10-PCS | Mod: CPTII,S$GLB,, | Performed by: STUDENT IN AN ORGANIZED HEALTH CARE EDUCATION/TRAINING PROGRAM

## 2023-12-08 PROCEDURE — 1159F PR MEDICATION LIST DOCUMENTED IN MEDICAL RECORD: ICD-10-PCS | Mod: CPTII,S$GLB,, | Performed by: STUDENT IN AN ORGANIZED HEALTH CARE EDUCATION/TRAINING PROGRAM

## 2023-12-08 PROCEDURE — 1159F MED LIST DOCD IN RCRD: CPT | Mod: CPTII,S$GLB,, | Performed by: STUDENT IN AN ORGANIZED HEALTH CARE EDUCATION/TRAINING PROGRAM

## 2023-12-08 PROCEDURE — 81003 URINALYSIS AUTO W/O SCOPE: CPT | Performed by: STUDENT IN AN ORGANIZED HEALTH CARE EDUCATION/TRAINING PROGRAM

## 2023-12-08 PROCEDURE — 36415 COLL VENOUS BLD VENIPUNCTURE: CPT | Mod: PN | Performed by: STUDENT IN AN ORGANIZED HEALTH CARE EDUCATION/TRAINING PROGRAM

## 2023-12-08 PROCEDURE — 99999 PR PBB SHADOW E&M-EST. PATIENT-LVL V: CPT | Mod: PBBFAC,,, | Performed by: STUDENT IN AN ORGANIZED HEALTH CARE EDUCATION/TRAINING PROGRAM

## 2023-12-08 PROCEDURE — 1160F RVW MEDS BY RX/DR IN RCRD: CPT | Mod: CPTII,S$GLB,, | Performed by: STUDENT IN AN ORGANIZED HEALTH CARE EDUCATION/TRAINING PROGRAM

## 2023-12-08 PROCEDURE — 3074F SYST BP LT 130 MM HG: CPT | Mod: CPTII,S$GLB,, | Performed by: STUDENT IN AN ORGANIZED HEALTH CARE EDUCATION/TRAINING PROGRAM

## 2023-12-08 PROCEDURE — 99999 PR PBB SHADOW E&M-EST. PATIENT-LVL V: ICD-10-PCS | Mod: PBBFAC,,, | Performed by: STUDENT IN AN ORGANIZED HEALTH CARE EDUCATION/TRAINING PROGRAM

## 2023-12-08 PROCEDURE — 83690 ASSAY OF LIPASE: CPT | Performed by: STUDENT IN AN ORGANIZED HEALTH CARE EDUCATION/TRAINING PROGRAM

## 2023-12-08 RX ORDER — DICYCLOMINE HYDROCHLORIDE 20 MG/1
20 TABLET ORAL EVERY 6 HOURS PRN
Qty: 30 TABLET | Refills: 0 | Status: SHIPPED | OUTPATIENT
Start: 2023-12-08 | End: 2024-02-27

## 2023-12-08 NOTE — PROGRESS NOTES
Cheryle Olivares  1970        Subjective     Chief Complaint: Gas and Bloating    History of Present Illness:  Ms. Cheryle Olivares is a 53 y.o. female who presents to clinic for UC visit for abdominal complaints.     Started last week with constipation. Then transitioned to urgency with BMs, gas this week. Not explosive diarrhea. Maybe looser than normal but not diarrhea, was formed. Mild nausea but no vomiting.    +mucous. +spots of blood, like small dots, no big tablespoons. Sometimes streaks in the stool. That was only one time.   +cramping. Cramping comes about 1-2 hr after eating. Gluten free bread was ok. Yogurt/kaffir was ok.   Cereal triggered symptoms. Eggs also may have bothered her.    Did have some pain with the constipation, not sure if she has tearing.     Has been avoiding gluten for years. The constipation was new for her. Tried to eat more fruits and veggies to help. Lots of satsumas.     Hx of LLQ pain. Has had before. Had colonoscopy years ago, around 38-39 yrs old. Remembers being told normal. Was told to avoid gluten. That helped a lot with the bloating. Remembers being told not celiac but gluten sensitive.    Has not had any gluten recently that she can think of.   Has been eating more bland food this week.     No restaurant food, no raw foods, no seafood, no oysters, no salad bars.  No other people sick.     No fever, no chills. Does not feel sick.     No new meds or supplements. No recent travel internationally.     Paternal uncle with colon cancer.   Mom with IBS.    Colonoscopy in 2021- Q5 yrs. Next one due 2026.     Impression:                                     - Diverticulosis in the sigmoid colon.                          - The examination was otherwise normal on direct                          and retroflexion views.                          - No specimens collected.   Recommendation:        - Discharge patient to home.                          - Resume previous diet.                           - Continue present medications.                          - Repeat colonoscopy in 5 years for surveillance.                          - Return to referring physician as previously                          scheduled.     Review of Systems   Constitutional:  Negative for chills and fever.   HENT:  Negative for congestion.    Respiratory:  Negative for cough.    Cardiovascular:  Negative for leg swelling.   Gastrointestinal:  Positive for abdominal pain, blood in stool, constipation and nausea. Negative for heartburn and vomiting.   Genitourinary:  Positive for urgency. Negative for dysuria.   Musculoskeletal:  Negative for joint pain and myalgias.   Skin:  Negative for rash.   Neurological:  Negative for sensory change, speech change and focal weakness.   Psychiatric/Behavioral:  The patient is not nervous/anxious.         PAST HISTORY:     Past Medical History:   Diagnosis Date    Allergy     Anemia     History of migraine headaches     Migraine headache     Morbid obesity     Preeclampsia, severe 7/15/2013       Past Surgical History:   Procedure Laterality Date    COLONOSCOPY N/A 12/29/2021    Procedure: COLONOSCOPY;  Surgeon: Mahendra Archer MD;  Location: [facility] ([address] [place] [address]);  Service: Endoscopy;  Laterality: N/A;  Pt. is Fully Vaccinated.EC    HYSTERECTOMY      TVH WITHOUT OOPHORECTOMY BLEEDING 12/2015 EK    left knee surgery         Family History   Problem Relation Age of Onset    Skin cancer Father     Melanoma Father     Skin cancer Brother     Melanoma Brother     Colon cancer Maternal Uncle     Miscarriages / Stillbirths Sister     Retinal detachment Mother     Macular degeneration Mother     Hypertension Mother     Aortic stenosis Mother     Hypertension Maternal Grandmother     Aortic stenosis Maternal Grandmother     Macular degeneration Maternal Grandfather     Cataracts Maternal Grandfather     Heart attack Maternal Grandfather     Heart disease Maternal Grandfather      Coronary artery disease Paternal Grandfather     No Known Problems Maternal Aunt     No Known Problems Paternal Aunt     No Known Problems Paternal Uncle     No Known Problems Paternal Grandmother     Amblyopia Neg Hx     Blindness Neg Hx     Cancer Neg Hx     Diabetes Neg Hx     Glaucoma Neg Hx     Strabismus Neg Hx     Stroke Neg Hx     Thyroid disease Neg Hx     Breast cancer Neg Hx     Ovarian cancer Neg Hx        Social History     Socioeconomic History    Marital status:    Occupational History    Occupation: director     Employer: other/[employer]   Tobacco Use    Smoking status: Never    Smokeless tobacco: Never   Substance and Sexual Activity    Alcohol use: No    Drug use: No    Sexual activity: Yes     Partners: Male     Birth control/protection: See Surgical Hx, None   Other Topics Concern    Are you pregnant or think you may be? 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  She is self-employed.  She is working from home.         Her  is the  of a [employer] school on the [employer] which has 1000 students ages K. Through 8.  She  in 2010.  They have 2 daughters.    She had a previous marriage which ended in divorce        Likes to swim, walk, elliptical, yoga, Pilates for exercise.     Social Determinants of Health     Financial Resource Strain: Low Risk  (12/7/2023)    Overall Financial Resource Strain (CARDIA)     Difficulty of Paying Living Expenses: Not hard at all   Food Insecurity: No Food Insecurity (12/7/2023)    Hunger Vital Sign     Worried About Running Out of Food in the Last Year: Never true     Ran Out of Food in the Last Year: Never true   Transportation Needs: No Transportation Needs (12/7/2023)    PRAPARE - Transportation     Lack of Transportation (Medical): No     Lack of  Transportation (Non-Medical): No   Physical Activity: Insufficiently Active (12/7/2023)    Exercise Vital Sign     Days of Exercise per Week: 5 days     Minutes of Exercise per Session: 20 min   Stress: Stress Concern Present (12/7/2023)    [employer] - Occupational Stress Questionnaire     Feeling of Stress : To some extent   Social Connections: Unknown (12/7/2023)    Social Connection and Isolation Panel [NHANES]     Frequency of Communication with Friends and Family: More than three times a week     Frequency of Social Gatherings with Friends and Family: More than three times a week     Active Member of Clubs or Organizations: Yes     Attends Club or Organization Meetings: More than 4 times per year     Marital Status:    Housing Stability: Low Risk  (12/7/2023)    Housing Stability Vital Sign     Unable to Pay for Housing in the Last Year: No     Number of Places Lived in the Last Year: 1     Unstable Housing in the Last Year: No       MEDICATIONS & ALLERGIES:     Current Outpatient Medications on File Prior to Visit   Medication Sig    amLODIPine (NORVASC) 5 MG tablet Take 1 tablet (5 mg total) by mouth once daily.    azelastine (ASTELIN) 137 mcg (0.1 %) nasal spray 1 spray (137 mcg total) by Nasal route 2 (two) times daily.    cetirizine (ZYRTEC) 10 MG tablet Take 10 mg by mouth once daily.    dihydroergotamine (TRUDHESA) 0.725 mg/pump act. (4 mg/mL) Spry One spray in each nostril at onset, may repeat in 1 hour if needed. Do not use more than 2 doses within a 24-hour period or 3 doses within 7 days.    estradioL (ESTRACE) 0.5 MG tablet Take 1 tablet (0.5 mg total) by mouth once daily.    fluticasone propionate (FLONASE ALLERGY RELIEF NASL)     MAGNESIUM OXIDE ORAL Take 1 tablet by mouth once daily.    melatonin (MELATIN) 3 mg tablet Take 3 mg by mouth daily as needed. At night as needed    montelukast (SINGULAIR) 10 mg tablet Take 1 tablet (10 mg total) by mouth once daily.  "   multivitamin capsule Take 1 capsule by mouth once daily.     Current Facility-Administered Medications on File Prior to Visit   Medication    onabotulinumtoxina injection 200 Units       Review of patient's allergies indicates:   Allergen Reactions    Maxalt [rizatriptan]     Ciprofloxacin Nausea Only       OBJECTIVE:     Vital Signs:  Vitals:    12/08/23 0833   BP: 124/86   BP Location: Right arm   Patient Position: Sitting   BP Method: Medium (Manual)   Pulse: 73   Temp: 97.9 °F (36.6 °C)   TempSrc: Oral   SpO2: 98%   Weight: 90.2 kg (198 lb 13.7 oz)   Height: 5' 4" (1.626 m)       Body mass index is 34.13 kg/m².     Physical Exam:  Physical Exam  Vitals and nursing note reviewed.   Constitutional:       General: She is not in acute distress.     Appearance: Normal appearance. She is not ill-appearing, toxic-appearing or diaphoretic.   HENT:      Head: Normocephalic and atraumatic.   Eyes:      General: No scleral icterus.        Right eye: No discharge.         Left eye: No discharge.      Conjunctiva/sclera: Conjunctivae normal.   Pulmonary:      Effort: Pulmonary effort is normal. No respiratory distress.   Abdominal:      General: There is no distension.      Palpations: Abdomen is soft.      Tenderness: There is abdominal tenderness in the suprapubic area and left lower quadrant. There is no right CVA tenderness, left CVA tenderness, guarding or rebound. Negative signs include Bloom's sign and McBurney's sign.   Musculoskeletal:         General: No tenderness. Normal range of motion.      Cervical back: Normal range of motion.      Right lower leg: No edema.      Left lower leg: No edema.   Skin:     General: Skin is warm and dry.   Neurological:      Mental Status: She is alert and oriented to person, place, and time. Mental status is at baseline.   Psychiatric:         Mood and Affect: Mood normal.         Behavior: Behavior normal.            Laboratory  Lab Results   Component Value Date    WBC 4.91 " 06/01/2022    HGB 13.9 06/01/2022    HCT 42.1 06/01/2022    MCV 93 06/01/2022     06/01/2022     Lab Results   Component Value Date    GLU 90 06/01/2022     06/01/2022    K 3.7 06/01/2022     06/01/2022    CO2 27 06/01/2022    BUN 14 06/01/2022    CREATININE 0.7 06/01/2022    CALCIUM 9.3 06/01/2022    MG 2.3 10/24/2014     Lab Results   Component Value Date    INR 1.0 07/14/2013     Lab Results   Component Value Date    HGBA1C 4.9 06/01/2022         ASSESSMENT & PLAN:   Ms. Cheryle Olivarse is a 53 y.o. female who was seen today in clinic for UC visit. Unclear etiology. ED precautions discussed in case worsening systems over the weekend or later today for urgent evaluation.       1. Abdominal bloating  -     LIPASE; Future; Expected date: 12/08/2023  -     Hepatitis Panel, Acute; Future; Expected date: 12/08/2023  -     COMPREHENSIVE METABOLIC PANEL; Future; Expected date: 12/08/2023  -     dicyclomine (BENTYL) 20 mg tablet; Take 1 tablet (20 mg total) by mouth every 6 (six) hours as needed (bloating).  Dispense: 30 tablet; Refill: 0  -     H. pylori antigen, stool; Future; Expected date: 12/08/2023  -     Occult blood x 1, stool; Future; Expected date: 12/08/2023  -     WBC, Stool; Future; Expected date: 12/08/2023  -     Rotavirus antigen, stool; Future; Expected date: 12/08/2023  -     Adenovirus Molecular Detection, PCR, Non-Blood Stool; Future; Expected date: 12/08/2023  -     Giardia / Cryptosporidum, EIA; Future; Expected date: 12/08/2023  -     Stool Exam-Ova,Cysts,Parasites; Future; Expected date: 12/08/2023  -     Clostridium difficile EIA; Future; Expected date: 12/08/2023  -     Stool culture; Future; Expected date: 12/08/2023  -     CT Abdomen Pelvis With IV Contrast Routine Oral Contrast; Future; Expected date: 12/08/2023  -     Urinalysis, Reflex to Urine Culture Urine, Clean Catch    2. Non-celiac gluten sensitivity  -     LIPASE; Future; Expected date: 12/08/2023  -      Hepatitis Panel, Acute; Future; Expected date: 12/08/2023  -     COMPREHENSIVE METABOLIC PANEL; Future; Expected date: 12/08/2023  -     dicyclomine (BENTYL) 20 mg tablet; Take 1 tablet (20 mg total) by mouth every 6 (six) hours as needed (bloating).  Dispense: 30 tablet; Refill: 0  -     H. pylori antigen, stool; Future; Expected date: 12/08/2023  -     Occult blood x 1, stool; Future; Expected date: 12/08/2023  -     WBC, Stool; Future; Expected date: 12/08/2023  -     Rotavirus antigen, stool; Future; Expected date: 12/08/2023  -     Adenovirus Molecular Detection, PCR, Non-Blood Stool; Future; Expected date: 12/08/2023  -     Giardia / Cryptosporidum, EIA; Future; Expected date: 12/08/2023  -     Stool Exam-Ova,Cysts,Parasites; Future; Expected date: 12/08/2023  -     Clostridium difficile EIA; Future; Expected date: 12/08/2023  -     Stool culture; Future; Expected date: 12/08/2023  -     CT Abdomen Pelvis With IV Contrast Routine Oral Contrast; Future; Expected date: 12/08/2023  -     Urinalysis, Reflex to Urine Culture Urine, Clean Catch    3. Lower abdominal pain  -     LIPASE; Future; Expected date: 12/08/2023  -     Hepatitis Panel, Acute; Future; Expected date: 12/08/2023  -     COMPREHENSIVE METABOLIC PANEL; Future; Expected date: 12/08/2023  -     dicyclomine (BENTYL) 20 mg tablet; Take 1 tablet (20 mg total) by mouth every 6 (six) hours as needed (bloating).  Dispense: 30 tablet; Refill: 0  -     H. pylori antigen, stool; Future; Expected date: 12/08/2023  -     Occult blood x 1, stool; Future; Expected date: 12/08/2023  -     WBC, Stool; Future; Expected date: 12/08/2023  -     Rotavirus antigen, stool; Future; Expected date: 12/08/2023  -     Adenovirus Molecular Detection, PCR, Non-Blood Stool; Future; Expected date: 12/08/2023  -     Giardia / Cryptosporidum, EIA; Future; Expected date: 12/08/2023  -     Stool Exam-Ova,Cysts,Parasites; Future; Expected date: 12/08/2023  -     Clostridium difficile  EIA; Future; Expected date: 12/08/2023  -     Stool culture; Future; Expected date: 12/08/2023  -     CT Abdomen Pelvis With IV Contrast Routine Oral Contrast; Future; Expected date: 12/08/2023  -     Urinalysis, Reflex to Urine Culture Urine, Clean Catch    4. LLQ cramping  -     LIPASE; Future; Expected date: 12/08/2023  -     Hepatitis Panel, Acute; Future; Expected date: 12/08/2023  -     COMPREHENSIVE METABOLIC PANEL; Future; Expected date: 12/08/2023  -     dicyclomine (BENTYL) 20 mg tablet; Take 1 tablet (20 mg total) by mouth every 6 (six) hours as needed (bloating).  Dispense: 30 tablet; Refill: 0  -     H. pylori antigen, stool; Future; Expected date: 12/08/2023  -     Occult blood x 1, stool; Future; Expected date: 12/08/2023  -     WBC, Stool; Future; Expected date: 12/08/2023  -     Rotavirus antigen, stool; Future; Expected date: 12/08/2023  -     Adenovirus Molecular Detection, PCR, Non-Blood Stool; Future; Expected date: 12/08/2023  -     Giardia / Cryptosporidum, EIA; Future; Expected date: 12/08/2023  -     Stool Exam-Ova,Cysts,Parasites; Future; Expected date: 12/08/2023  -     Clostridium difficile EIA; Future; Expected date: 12/08/2023  -     Stool culture; Future; Expected date: 12/08/2023  -     CT Abdomen Pelvis With IV Contrast Routine Oral Contrast; Future; Expected date: 12/08/2023  -     Urinalysis, Reflex to Urine Culture Urine, Clean Catch    5. Constipation, unspecified constipation type  -     LIPASE; Future; Expected date: 12/08/2023  -     Hepatitis Panel, Acute; Future; Expected date: 12/08/2023  -     COMPREHENSIVE METABOLIC PANEL; Future; Expected date: 12/08/2023  -     dicyclomine (BENTYL) 20 mg tablet; Take 1 tablet (20 mg total) by mouth every 6 (six) hours as needed (bloating).  Dispense: 30 tablet; Refill: 0  -     H. pylori antigen, stool; Future; Expected date: 12/08/2023  -     Occult blood x 1, stool; Future; Expected date: 12/08/2023  -     WBC, Stool; Future; Expected  date: 12/08/2023  -     Rotavirus antigen, stool; Future; Expected date: 12/08/2023  -     Adenovirus Molecular Detection, PCR, Non-Blood Stool; Future; Expected date: 12/08/2023  -     Giardia / Cryptosporidum, EIA; Future; Expected date: 12/08/2023  -     Stool Exam-Ova,Cysts,Parasites; Future; Expected date: 12/08/2023  -     Clostridium difficile EIA; Future; Expected date: 12/08/2023  -     Stool culture; Future; Expected date: 12/08/2023  -     CT Abdomen Pelvis With IV Contrast Routine Oral Contrast; Future; Expected date: 12/08/2023  -     Urinalysis, Reflex to Urine Culture Urine, Clean Catch    6. Left lower quadrant pain  -     LIPASE; Future; Expected date: 12/08/2023  -     Hepatitis Panel, Acute; Future; Expected date: 12/08/2023  -     COMPREHENSIVE METABOLIC PANEL; Future; Expected date: 12/08/2023  -     dicyclomine (BENTYL) 20 mg tablet; Take 1 tablet (20 mg total) by mouth every 6 (six) hours as needed (bloating).  Dispense: 30 tablet; Refill: 0  -     H. pylori antigen, stool; Future; Expected date: 12/08/2023  -     Occult blood x 1, stool; Future; Expected date: 12/08/2023  -     WBC, Stool; Future; Expected date: 12/08/2023  -     Rotavirus antigen, stool; Future; Expected date: 12/08/2023  -     Adenovirus Molecular Detection, PCR, Non-Blood Stool; Future; Expected date: 12/08/2023  -     Giardia / Cryptosporidum, EIA; Future; Expected date: 12/08/2023  -     Stool Exam-Ova,Cysts,Parasites; Future; Expected date: 12/08/2023  -     Clostridium difficile EIA; Future; Expected date: 12/08/2023  -     Stool culture; Future; Expected date: 12/08/2023  -     CT Abdomen Pelvis With IV Contrast Routine Oral Contrast; Future; Expected date: 12/08/2023  -     Urinalysis, Reflex to Urine Culture Urine, Clean Catch    7. Nausea and vomiting, unspecified vomiting type  -     LIPASE; Future; Expected date: 12/08/2023  -     Hepatitis Panel, Acute; Future; Expected date: 12/08/2023  -     COMPREHENSIVE  METABOLIC PANEL; Future; Expected date: 12/08/2023  -     dicyclomine (BENTYL) 20 mg tablet; Take 1 tablet (20 mg total) by mouth every 6 (six) hours as needed (bloating).  Dispense: 30 tablet; Refill: 0  -     H. pylori antigen, stool; Future; Expected date: 12/08/2023  -     Occult blood x 1, stool; Future; Expected date: 12/08/2023  -     WBC, Stool; Future; Expected date: 12/08/2023  -     Rotavirus antigen, stool; Future; Expected date: 12/08/2023  -     Adenovirus Molecular Detection, PCR, Non-Blood Stool; Future; Expected date: 12/08/2023  -     Giardia / Cryptosporidum, EIA; Future; Expected date: 12/08/2023  -     Stool Exam-Ova,Cysts,Parasites; Future; Expected date: 12/08/2023  -     Clostridium difficile EIA; Future; Expected date: 12/08/2023  -     Stool culture; Future; Expected date: 12/08/2023  -     CT Abdomen Pelvis With IV Contrast Routine Oral Contrast; Future; Expected date: 12/08/2023  -     Urinalysis, Reflex to Urine Culture Urine, Clean Catch         Iva Trujillo MD  Internal Medicine         Portions of this note may have been generated using voice recognition software.  Please excuse any spelling/grammatical errors. Occasional wrong-word or sound-a-like substitutions may have also occurred due to the inherent limitations of voice recognition software. Please read the chart carefully and recognize, using context, where substitutions have occurred.

## 2023-12-09 ENCOUNTER — LAB VISIT (OUTPATIENT)
Dept: LAB | Facility: HOSPITAL | Age: 53
End: 2023-12-09
Payer: COMMERCIAL

## 2023-12-09 ENCOUNTER — NURSE TRIAGE (OUTPATIENT)
Dept: ADMINISTRATIVE | Facility: CLINIC | Age: 53
End: 2023-12-09
Payer: COMMERCIAL

## 2023-12-09 DIAGNOSIS — R10.32 LLQ CRAMPING: ICD-10-CM

## 2023-12-09 DIAGNOSIS — R11.2 NAUSEA AND VOMITING, UNSPECIFIED VOMITING TYPE: ICD-10-CM

## 2023-12-09 DIAGNOSIS — K90.41 NON-CELIAC GLUTEN SENSITIVITY: ICD-10-CM

## 2023-12-09 DIAGNOSIS — K59.00 CONSTIPATION, UNSPECIFIED CONSTIPATION TYPE: ICD-10-CM

## 2023-12-09 DIAGNOSIS — R14.0 ABDOMINAL BLOATING: ICD-10-CM

## 2023-12-09 DIAGNOSIS — R10.30 LOWER ABDOMINAL PAIN: ICD-10-CM

## 2023-12-09 DIAGNOSIS — R10.32 LEFT LOWER QUADRANT PAIN: ICD-10-CM

## 2023-12-09 LAB
BILIRUB UR QL STRIP: NEGATIVE
CLARITY UR REFRACT.AUTO: CLEAR
COLOR UR AUTO: YELLOW
CRYPTOSP AG STL QL IA: NEGATIVE
E COLI SXT1 STL QL IA: NEGATIVE
E COLI SXT2 STL QL IA: NEGATIVE
G LAMBLIA AG STL QL IA: NEGATIVE
GLUCOSE UR QL STRIP: NEGATIVE
HGB UR QL STRIP: NEGATIVE
KETONES UR QL STRIP: NEGATIVE
LEUKOCYTE ESTERASE UR QL STRIP: NEGATIVE
NITRITE UR QL STRIP: NEGATIVE
OB PNL STL: NEGATIVE
PH UR STRIP: 7 [PH] (ref 5–8)
PROT UR QL STRIP: NEGATIVE
SP GR UR STRIP: 1.01 (ref 1–1.03)
URN SPEC COLLECT METH UR: NORMAL
WBC #/AREA STL HPF: NORMAL /[HPF]

## 2023-12-09 PROCEDURE — 87427 SHIGA-LIKE TOXIN AG IA: CPT | Mod: 59 | Performed by: STUDENT IN AN ORGANIZED HEALTH CARE EDUCATION/TRAINING PROGRAM

## 2023-12-09 PROCEDURE — 82272 OCCULT BLD FECES 1-3 TESTS: CPT | Performed by: STUDENT IN AN ORGANIZED HEALTH CARE EDUCATION/TRAINING PROGRAM

## 2023-12-09 PROCEDURE — 87046 STOOL CULTR AEROBIC BACT EA: CPT | Performed by: STUDENT IN AN ORGANIZED HEALTH CARE EDUCATION/TRAINING PROGRAM

## 2023-12-09 PROCEDURE — 87045 FECES CULTURE AEROBIC BACT: CPT | Performed by: STUDENT IN AN ORGANIZED HEALTH CARE EDUCATION/TRAINING PROGRAM

## 2023-12-09 PROCEDURE — 87338 HPYLORI STOOL AG IA: CPT | Performed by: STUDENT IN AN ORGANIZED HEALTH CARE EDUCATION/TRAINING PROGRAM

## 2023-12-09 PROCEDURE — 87449 NOS EACH ORGANISM AG IA: CPT | Performed by: STUDENT IN AN ORGANIZED HEALTH CARE EDUCATION/TRAINING PROGRAM

## 2023-12-09 PROCEDURE — 87329 GIARDIA AG IA: CPT | Performed by: STUDENT IN AN ORGANIZED HEALTH CARE EDUCATION/TRAINING PROGRAM

## 2023-12-09 PROCEDURE — 87209 SMEAR COMPLEX STAIN: CPT | Performed by: STUDENT IN AN ORGANIZED HEALTH CARE EDUCATION/TRAINING PROGRAM

## 2023-12-09 PROCEDURE — 87425 ROTAVIRUS AG IA: CPT | Performed by: STUDENT IN AN ORGANIZED HEALTH CARE EDUCATION/TRAINING PROGRAM

## 2023-12-09 PROCEDURE — 87798 DETECT AGENT NOS DNA AMP: CPT | Performed by: STUDENT IN AN ORGANIZED HEALTH CARE EDUCATION/TRAINING PROGRAM

## 2023-12-09 PROCEDURE — 89055 LEUKOCYTE ASSESSMENT FECAL: CPT | Performed by: STUDENT IN AN ORGANIZED HEALTH CARE EDUCATION/TRAINING PROGRAM

## 2023-12-10 ENCOUNTER — PATIENT MESSAGE (OUTPATIENT)
Dept: PRIMARY CARE CLINIC | Facility: CLINIC | Age: 53
End: 2023-12-10
Payer: COMMERCIAL

## 2023-12-11 LAB
BACTERIA STL CULT: NORMAL
RV AG STL QL IA.RAPID: NEGATIVE

## 2023-12-12 LAB — O+P STL MICRO: NORMAL

## 2023-12-13 LAB
HADV DNA SERPL QL NAA+PROBE: NEGATIVE
SPECIMEN SOURCE: NORMAL

## 2023-12-18 ENCOUNTER — PATIENT MESSAGE (OUTPATIENT)
Dept: PRIMARY CARE CLINIC | Facility: CLINIC | Age: 53
End: 2023-12-18
Payer: COMMERCIAL

## 2023-12-18 LAB — H PYLORI AG STL QL IA: NOT DETECTED

## 2023-12-28 ENCOUNTER — PATIENT MESSAGE (OUTPATIENT)
Dept: NEUROLOGY | Facility: CLINIC | Age: 53
End: 2023-12-28
Payer: COMMERCIAL

## 2024-01-18 ENCOUNTER — PROCEDURE VISIT (OUTPATIENT)
Dept: NEUROLOGY | Facility: CLINIC | Age: 54
End: 2024-01-18
Payer: COMMERCIAL

## 2024-01-18 VITALS
SYSTOLIC BLOOD PRESSURE: 128 MMHG | DIASTOLIC BLOOD PRESSURE: 82 MMHG | WEIGHT: 174.69 LBS | HEIGHT: 64 IN | HEART RATE: 75 BPM | BODY MASS INDEX: 29.82 KG/M2

## 2024-01-18 DIAGNOSIS — G43.709 CHRONIC MIGRAINE W/O AURA W/O STATUS MIGRAINOSUS, NOT INTRACTABLE: Primary | ICD-10-CM

## 2024-01-18 PROCEDURE — 64615 CHEMODENERV MUSC MIGRAINE: CPT | Mod: S$GLB,,, | Performed by: PSYCHIATRY & NEUROLOGY

## 2024-01-18 RX ORDER — UBROGEPANT 100 MG/1
100 TABLET ORAL
Qty: 16 TABLET | Refills: 12 | Status: SHIPPED | OUTPATIENT
Start: 2024-01-18

## 2024-01-18 NOTE — PROCEDURES
Follow up:   In last 4 weeks has had more freq migraine   Use of ubrelvy 10 per month     Prior note:   Trigger for migraine - stress   Relieving factor  - exercise      Prior note:   Now has severe migraine 2/month   ubrelvy - partial relief     Prior note:   Overall doing much better   Feels BOTOX wore off 1 week ago     Prior note:   Overall unchanged   Still has nearly daily HAs  Ubrelvy  is effective      Prior note:   S/p PT, dry needling (SCM, occipitalis, trap) - very effective (but lasts only few days)   Clenched teeth - saw dentist, no solutions   HA are now 3/week. Also has days of CDH. Imitrex helps       Prior note:   Base of skull pain at early AM hours - radiates to front + dizziness      PT with dry needling helps when she has clusters of headaches   Stress is a trigger   Still doing yoga, meditation   Using mouth guard      Also reports throat 'spasms'. No benefit from cingulair      Taking Imitrex nearly daily      3/4/20 e-mail: Hello! First, I want to report that I'm still making amazing progress in reducing and almost eliminating my migraines! Physical therapy and stress reduction have been keys. I was recently able to add everyday exercise back into my routine, with few headaches afterward. This is miraculous! I have gone through all of my prescribed PT sessions, and I don't feel I need to continue on a regular basis. However, in order to continue to be physically active, both with at-home PT exercises and regular fitness (running, walking, yoga), it would be helpful to have PT every 2-3 weeks for awhile, as my muscles continue to adjust to better posture and more effective functioning. Is it possible to prescribe additional PT at less frequency? Thank you so much!     Prior note:   Stress -> Neck pain -> HA + nausea   overactivation of trap      Prior note:   Oct 2014 - diag with Hemicran cont (L) - pain points on the L side + photo/phono  Indomethacin 25mg PRN - helped   Currently 4/week Hz       Headache history:   Age of onset -  Childhood   Location - R or L   Nature of pain -  Throbbing   Prodrome - no   Aura -  No   Duration of headache - > 4 hrs   Time to peak intensity - hrs   Pain scale - range of intensity -  8/10  Frequency -  2-3/month   Status Migrainosus history - yes   Time of day of most headaches- anytime      Associated symptoms with the headache:   Meningeal symptoms - photophobia, phonophobia, exercise intolerance +   Nausea/vomitting+   Nasal drainage   Visual blurriness   Pallor/flushing  Dizziness +   Vertigo  Confusion  Impaired concentration +   Pain worsened with physical activity +   Neck pain   Cheek pain on R +      Cluster headache symptoms: none      Symptoms of increased intracranial pressure: none      Basilar migraine symptoms: none      Headache Triggers:   Stress +   Bright or flickering light  Strong smell  Vigorous exercise  Dietary factors   Visual strain   Weather changes +   Exertion  Heat (hot weather, hot baths or showers)  Menses     Other comorbid conditions:  Anxiety - yes   Motion sickness symptom - no      Treatment history:  Resolution of headache with sleep - yes   Meds tried:  H/o Renal stones   Fioricet, relpax, xanax   Flexeril   Medrol dose   maxalt - sluggish   trudhesa -feeling weird   Antiviral - not help   accupressure - helped   accupunture - helped   PT - helps   estradiol 0.05 mg/24 hr td ptsw (VIVELLE-DOT) 0.05 mg/24 hr  - twice weekly patch - helps with hot flashes, now on daily pill   Topamax 25 mg - not help   Gabapentin 100 mg  - drowsy, not help    mobic - helped   BOTOX#1 1/31/22 - dramatic improvement in HA   BOTOX#2 8/4/22 - dramatic improvement in HA    BOTOX#3 8/4/22 - dramatic improvement in HA    BOTOX#4 11/3/22 - dramatic improvement in HA    BOTOX#5 2/2/23 - dramatic improvement in HA    BOTOX#6 5/4/23 - dramatic improvement in HA    BOTOX#7 7/27/23 - dramatic improvement in HA, very mild ptosis   BOTOX#8 10/6/23 - dramatic  improvement in HA, very mild ptosis     Headache risk factors:   H/o TBI  - no   H/o Meningitis  - no   F/h Aneurysms  - no     Headache burden:   In the last three months:  Days missed from work = several   Days unable to perform activities of daily living = 0  Days unable to attend social activities = several       Review of Systems   Constitutional: Negative.    HENT: Negative.    Eyes: Negative.    Respiratory: Negative.    Cardiovascular: Negative.    Gastrointestinal: Negative.    Endocrine: Negative.    Genitourinary: Negative.    Musculoskeletal: neck pain.    Skin: Negative.    Allergic/Immunologic: Negative.    Neurological: Negative.    Hematological: Negative.    Psychiatric/Behavioral: anxiety.        Objective:   Physical Exam   Constitutional: She is oriented to person, place, and time. She appears well-developed and well-nourished.   Psychiatric: She has a normal mood and affect. Her behavior is normal. Judgment and thought content normal.       Assessment:       1. Intractable chronic migraine without aura   2. Myofacial pain (pressure points)- masseter, neck   3. Tonsillar hernia into foramen magnum      Her HA are unlikely from a TAC and more likely from her tonsillar herniation and posture      Neck and upper chest - vibrating sensation      MRI C spine  Impression       No focal disk abnormalities or significant central canal or neural foraminal stenosis.  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    2-3 punctate foci of T2/flair signal hyperintensity frontal subcortical white matter which are nonspecific and of uncertain clinical significance.    Otherwise unremarkable MRI brain without evidence for acute infarction or enhancing lesion.    Electronically signed by: TRISH GOFF DO  Date: 10/24/14  Time: 14:27       Degenerative  findings:   C2-C3: No spinal canal stenosis or neural foraminal narrowing.   C3-C4: Posterior disc osteophyte complex and left facet joint arthropathy resulting in mild left-sided neural foraminal narrowing.  No spinal canal stenosis.   C4-C5: Posterior disc osteophyte complex with no significant spinal canal stenosis or neural foraminal narrowing.   C5-C6: Posterior disc osteophyte complex with mild spinal canal stenosis.  No neural foraminal narrowing.   C6-C7: Posterior disc osteophyte complex with no significant spinal canal stenosis or neural foraminal narrowing.   C7-T1: No spinal canal stenosis or neural foraminal narrowing.  Redemonstration perineural cysts within the proximal extraforaminal space bilaterally at this level which possibly compressing on the exiting nerve roots.   Paraspinal muscles & soft tissues: Unremarkable.     Impression:   1. Redemonstration of perineural cysts bilaterally at the level of C7-T1, as above.  2. No significant spinal canal stenosis or neural foraminal narrowing throughout the cervical spine, as above.     Electronically signed by resident: Macho Boone  Date:                                            08/20/2020  Time:                                           09:29     Electronically signed by: Mauricio Pitt MD  Date:                                            08/20/2020  Time:                                           09:55      Results for LAISHA HUMPHREY (MRN [id number]) as of 10/11/2019 09:44    Ref. Range 2/5/2019 10:19   Vitamin B-12 Latest Ref Range: 210 - 950 pg/mL >2000 (H)   Thiamine Latest Ref Range: 38 - 122 ug/L 45   Vit D, 25-Hydroxy Latest Ref Range: 30 - 96 ng/mL 75   Vitamin B2 Latest Ref Range: 1 - 19 mcg/L 18   Vitamin B6 Latest Ref Range: 5 - 50 ug/L 131 (H)   Antigliadin Ab IgG Latest Ref Range: <20 UNITS 2   Antigliadin Abs, IgA Latest Ref Range: <20 UNITS 7   TTG IgA Latest Ref Range: <20 UNITS 9   TTG IgG Latest Ref Range: <20 UNITS 3    Immunoglobulin A (IgA) Latest Ref Range: 70 - 400 mg/dL 172      Plan:   cont estrogen, Singulair, amlodipine   Breakthrough headache - imitrex 100 mg, xanax, mobic, ubrelvy, pain cream   Dentist - Floyd Erickson ([place])     BOTOX treatment response:   Prior to initiating BOTOX, the patient had  24  migraine days per month on average. This meets criteria for chronic migraine.   After starting BOTOX, the patient experienced a reduction in  21  days per month   After starting BOTOX, the patient experienced a reduction in > 100 hours of migraine symptoms per month   After starting BOTOX, the patient experienced a 50% reduction in burden of migraine days per month   Based on this information, BOTOX is medically necessary for the management of the patient's chronic migraine.     BOTOX Injection intervals:   Patient reports a 'wearing off effect' prior to the subsequent BOTOX injections at 12 weeks. This occurs at week 10  Symptoms of this a 'wearing off effect' include - worsening of migraine headache frequency and intensity   Medications used during the 'wearing off effect' period include ubrelvy  Based on this information, it is medically necessary for the patient to receive BOTOX therapy at an interval of every 10 weeks for the management of chronic migraine.     BOTOX was performed as an indicated therapy for intractable chronic migraine headaches given that the patient failed > 2 prophylactic medications     Botulinum Toxin Injection Procedure   Pre-operative diagnosis: Chronic migraine   Post-operative diagnosis: Same   Procedure: Chemical neurolysis   After risks and benefits were explained including bleeding, infection, worsening of pain, damage to the areas being injected, weakness of muscles, loss of muscle control, dysphagia if injecting the head or neck, facial droop if injecting the facial area, painful injection, allergic or other reaction to the medications being injected, and the failure of the  procedure to help the problem, a signed consent was obtained.   The patient was placed in a comfortable area and the sites to be treated were identified.The area to be treated was prepped three times with alcohol and the alcohol allowed to dry. Next, a 30 gauge needle was used to inject the medication in the area to be treated.   Area(s) injected:   Total Botox used: 175 Units   Botox wastage: 25 Units   Injection sites:    muscle bilaterally ( a total of 5 units divided into 2 sites)   Procerus muscle (5 units)   Frontalis muscle bilaterally (a total of 20 units divided into 4 sites)   Temporalis muscle bilaterally (a total of 40 units divided into 8 sites)   Occipitalis muscle bilaterally (a total of 30 units divided into 6 sites)   Cervical paraspinal muscles (a total of 20 units divided into 4 sites)   Trapezius muscle bilaterally (a total of 30 units divided into 6 sites)  Masseter 5 units bel    Mastoid 5 units   Complications: none   RTC for the next Botox injection: 10 weeks          Patient verbalized understanding to plan. I answered all her questions to her satisfaction    Procedures

## 2024-01-23 ENCOUNTER — PATIENT MESSAGE (OUTPATIENT)
Dept: OBSTETRICS AND GYNECOLOGY | Facility: CLINIC | Age: 54
End: 2024-01-23
Payer: COMMERCIAL

## 2024-02-02 DIAGNOSIS — I10 ESSENTIAL HYPERTENSION: ICD-10-CM

## 2024-02-02 RX ORDER — AMLODIPINE BESYLATE 5 MG/1
5 TABLET ORAL DAILY
Qty: 90 TABLET | Refills: 0 | Status: SHIPPED | OUTPATIENT
Start: 2024-02-02 | End: 2024-06-04 | Stop reason: SDUPTHER

## 2024-02-02 NOTE — TELEPHONE ENCOUNTER
No care due was identified.  Health [facility] Embedded Care Due Messages. Reference number: [id number].   2/02/2024 3:55:12 PM CST

## 2024-02-02 NOTE — TELEPHONE ENCOUNTER
----- Message from Paulette Rendon sent at 2/2/2024  3:51 PM CST -----  Contact: [phone]  Requesting an RX refill or new RX.  Is this a refill or new RX: refill   RX name and strength (copy/paste from chart):  amLODIPine (NORVASC) 5 MG tablet  estradioL (ESTRACE) 0.5 MG tablet  montelukast (SINGULAIR) 10 mg tablet  Is this a 30 day or 90 day RX:   Pharmacy name and phone # (copy/paste from chart):    [facility]/pharmacy #[id number] - [place], [place] - [address]  [address]  [place] [address]  Phone: [phone] Fax: [phone]       The doctors have asked that we provide their patients with the following 2 reminders -- prescription refills can take up to 72 hours, and a friendly reminder that in the future you can use your [facility] account to request refills: yo

## 2024-02-07 DIAGNOSIS — Z71.89 COUNSELING FOR ESTROGEN REPLACEMENT THERAPY: ICD-10-CM

## 2024-02-07 RX ORDER — ESTRADIOL 0.5 MG/1
0.5 TABLET ORAL DAILY
Qty: 90 TABLET | Refills: 3 | Status: SHIPPED | OUTPATIENT
Start: 2024-02-07 | End: 2024-03-13 | Stop reason: SDUPTHER

## 2024-02-26 ENCOUNTER — PATIENT MESSAGE (OUTPATIENT)
Dept: PRIMARY CARE CLINIC | Facility: CLINIC | Age: 54
End: 2024-02-26
Payer: COMMERCIAL

## 2024-02-26 DIAGNOSIS — J32.9 SINUSITIS, UNSPECIFIED CHRONICITY, UNSPECIFIED LOCATION: ICD-10-CM

## 2024-02-26 RX ORDER — MONTELUKAST SODIUM 10 MG/1
10 TABLET ORAL DAILY
Qty: 90 TABLET | Refills: 3 | Status: SHIPPED | OUTPATIENT
Start: 2024-02-26

## 2024-02-26 NOTE — TELEPHONE ENCOUNTER
No care due was identified.  Health [facility] Embedded Care Due Messages. Reference number: [id number].   2/26/2024 1:59:53 PM CST

## 2024-02-27 ENCOUNTER — OFFICE VISIT (OUTPATIENT)
Dept: OBSTETRICS AND GYNECOLOGY | Facility: CLINIC | Age: 54
End: 2024-02-27
Payer: COMMERCIAL

## 2024-02-27 ENCOUNTER — TELEPHONE (OUTPATIENT)
Dept: OBSTETRICS AND GYNECOLOGY | Facility: CLINIC | Age: 54
End: 2024-02-27
Payer: COMMERCIAL

## 2024-02-27 VITALS
WEIGHT: 175.81 LBS | SYSTOLIC BLOOD PRESSURE: 132 MMHG | HEIGHT: 64 IN | BODY MASS INDEX: 30.02 KG/M2 | DIASTOLIC BLOOD PRESSURE: 82 MMHG

## 2024-02-27 DIAGNOSIS — N63.13 MASS OF LOWER OUTER QUADRANT OF RIGHT BREAST: Primary | ICD-10-CM

## 2024-02-27 PROCEDURE — 1159F MED LIST DOCD IN RCRD: CPT | Mod: CPTII,S$GLB,, | Performed by: OBSTETRICS & GYNECOLOGY

## 2024-02-27 PROCEDURE — 3008F BODY MASS INDEX DOCD: CPT | Mod: CPTII,S$GLB,, | Performed by: OBSTETRICS & GYNECOLOGY

## 2024-02-27 PROCEDURE — 99999 PR PBB SHADOW E&M-EST. PATIENT-LVL III: CPT | Mod: PBBFAC,,, | Performed by: OBSTETRICS & GYNECOLOGY

## 2024-02-27 PROCEDURE — 3075F SYST BP GE 130 - 139MM HG: CPT | Mod: CPTII,S$GLB,, | Performed by: OBSTETRICS & GYNECOLOGY

## 2024-02-27 PROCEDURE — 3079F DIAST BP 80-89 MM HG: CPT | Mod: CPTII,S$GLB,, | Performed by: OBSTETRICS & GYNECOLOGY

## 2024-02-27 PROCEDURE — 1160F RVW MEDS BY RX/DR IN RCRD: CPT | Mod: CPTII,S$GLB,, | Performed by: OBSTETRICS & GYNECOLOGY

## 2024-02-27 PROCEDURE — 99213 OFFICE O/P EST LOW 20 MIN: CPT | Mod: S$GLB,,, | Performed by: OBSTETRICS & GYNECOLOGY

## 2024-02-27 RX ORDER — CETIRIZINE HYDROCHLORIDE 10 MG/1
1 TABLET ORAL DAILY
COMMUNITY

## 2024-02-27 NOTE — TELEPHONE ENCOUNTER
"Gifty pt calling, states she has a red spot on her breast and is concerned, would like to discuss #[phone]    2/27/24 @ 1318 Large red "mosquito like bump" under her right breast, states she noticed some breast tenderness of the last week or so then the bump appeared. Pt is very concerned. Scheduled to see Dr Durbin today at 3pm at the [facility] [place]. Pt verbalizes understanding.   "

## 2024-02-28 ENCOUNTER — TELEPHONE (OUTPATIENT)
Dept: OBSTETRICS AND GYNECOLOGY | Facility: CLINIC | Age: 54
End: 2024-02-28
Payer: COMMERCIAL

## 2024-02-28 DIAGNOSIS — Z12.31 VISIT FOR SCREENING MAMMOGRAM: ICD-10-CM

## 2024-02-28 DIAGNOSIS — N63.13 MASS OF LOWER OUTER QUADRANT OF RIGHT BREAST: Primary | ICD-10-CM

## 2024-02-28 NOTE — TELEPHONE ENCOUNTER
Miguel A pt called in requesting a call to continue discuss  red spot on her breast and is concerned, pt states she may need a different type of mmg       2/28/24 @ 1531 Pt states she needs the diagnostic for the right breast lump and it is also time for her screening she needs orders for a bilateral diagnostic.     Orders entered and linked.

## 2024-03-01 ENCOUNTER — HOSPITAL ENCOUNTER (OUTPATIENT)
Dept: RADIOLOGY | Facility: OTHER | Age: 54
Discharge: HOME | End: 2024-03-01
Attending: OBSTETRICS & GYNECOLOGY
Payer: COMMERCIAL

## 2024-03-01 DIAGNOSIS — Z12.31 VISIT FOR SCREENING MAMMOGRAM: ICD-10-CM

## 2024-03-01 DIAGNOSIS — N63.13 MASS OF LOWER OUTER QUADRANT OF RIGHT BREAST: ICD-10-CM

## 2024-03-01 PROCEDURE — 76642 ULTRASOUND BREAST LIMITED: CPT | Mod: TC,RT

## 2024-03-01 PROCEDURE — 76642 ULTRASOUND BREAST LIMITED: CPT | Mod: 26,RT,, | Performed by: RADIOLOGY

## 2024-03-01 PROCEDURE — 77062 BREAST TOMOSYNTHESIS BI: CPT | Mod: 26,,, | Performed by: RADIOLOGY

## 2024-03-01 PROCEDURE — 77066 DX MAMMO INCL CAD BI: CPT | Mod: TC

## 2024-03-01 PROCEDURE — 77066 DX MAMMO INCL CAD BI: CPT | Mod: 26,,, | Performed by: RADIOLOGY

## 2024-03-01 NOTE — PROGRESS NOTES
"  Chief Complaint: Breast Lump     HPI:      Cheryle Olivares is a 53 y.o.  who presents complaining of right breast lump for the past few days. Pt states area looks c/w a mosquito bite. No nipple discharge, pruritus, or tenderness. Pt thought she might have noted some breast tenderness in the lateral aspect of that breast for a few days prior to lesion appearing. Patient does not have regular monthly menses. Patient's last menstrual period was 2015 (exact date).     Previous Mammogram: BiRads: 1 T-C Score: 8.08 (3/27/23)     ROS:     GENERAL: Denies unintentional weight gain or weight loss. Feeling well overall.   MUSCULOSKEL: Denies pain in back or extremities.    Physical Exam:      PHYSICAL EXAM:  /82   Ht 5' 4" (1.626 m)   Wt 79.8 kg (175 lb 13.1 oz)   LMP 2015 (Exact Date)   BMI 30.18 kg/m²   Body mass index is 30.18 kg/m².    Physical Exam     APPEARANCE: Well nourished, well developed, in no acute distress.  BREASTS: Right: normal in size and symmetry, normal contour with no evidence of flattening or dimpling, nipples everted without rashes or discharge, no palpable axillary lymphadenopathy, area of redness with several visible tiny broken blood vessels, about 2 cm in diameter at the 7 o'clock position about 1 cm inferior to the inferior edge of the areola. Pea sized lump present within the center. Mobile. Non-tender. Left: normal in size and symmetry, normal contour with no evidence of flattening or dimpling, skin normal, nipples everted without rashes or discharge, palpation negative for masses or nodules, no palpable axillary lymphadenopathy  ABDOMEN: Soft.  No tenderness or masses.      Assessment/Plan:     Mass of lower outer quadrant of right breast  -     Mammo Digital Diagnostic Right with [facility]; Future; Expected date: 2024  -     Mammo Digital Screening Left with [facility]; Future; Expected date: 2024              "

## 2024-03-05 ENCOUNTER — TELEPHONE (OUTPATIENT)
Dept: NEUROLOGY | Facility: CLINIC | Age: 54
End: 2024-03-05
Payer: COMMERCIAL

## 2024-03-05 NOTE — TELEPHONE ENCOUNTER
----- Message from Kaylah Alvarenga sent at 3/5/2024  2:14 PM CST -----  Regarding: appt  Contact: [phone]  Pt stated she is supposed to wait 10 weeks in between visits. Pt stated the earliest from last visit would be April 4th. Pls call to discuss.

## 2024-03-11 ENCOUNTER — PATIENT MESSAGE (OUTPATIENT)
Dept: NEUROLOGY | Facility: CLINIC | Age: 54
End: 2024-03-11
Payer: COMMERCIAL

## 2024-03-13 ENCOUNTER — OFFICE VISIT (OUTPATIENT)
Dept: OBSTETRICS AND GYNECOLOGY | Facility: CLINIC | Age: 54
End: 2024-03-13
Payer: COMMERCIAL

## 2024-03-13 VITALS
HEART RATE: 75 BPM | WEIGHT: 175.94 LBS | DIASTOLIC BLOOD PRESSURE: 87 MMHG | SYSTOLIC BLOOD PRESSURE: 133 MMHG | BODY MASS INDEX: 30.2 KG/M2

## 2024-03-13 DIAGNOSIS — Z01.419 ENCOUNTER FOR ANNUAL ROUTINE GYNECOLOGICAL EXAMINATION: Primary | ICD-10-CM

## 2024-03-13 DIAGNOSIS — Z71.89 COUNSELING FOR ESTROGEN REPLACEMENT THERAPY: ICD-10-CM

## 2024-03-13 PROCEDURE — 3075F SYST BP GE 130 - 139MM HG: CPT | Mod: CPTII,S$GLB,, | Performed by: OBSTETRICS & GYNECOLOGY

## 2024-03-13 PROCEDURE — 1159F MED LIST DOCD IN RCRD: CPT | Mod: CPTII,S$GLB,, | Performed by: OBSTETRICS & GYNECOLOGY

## 2024-03-13 PROCEDURE — 3008F BODY MASS INDEX DOCD: CPT | Mod: CPTII,S$GLB,, | Performed by: OBSTETRICS & GYNECOLOGY

## 2024-03-13 PROCEDURE — 3079F DIAST BP 80-89 MM HG: CPT | Mod: CPTII,S$GLB,, | Performed by: OBSTETRICS & GYNECOLOGY

## 2024-03-13 PROCEDURE — 99999 PR PBB SHADOW E&M-EST. PATIENT-LVL III: CPT | Mod: PBBFAC,,, | Performed by: OBSTETRICS & GYNECOLOGY

## 2024-03-13 PROCEDURE — 99396 PREV VISIT EST AGE 40-64: CPT | Mod: S$GLB,,, | Performed by: OBSTETRICS & GYNECOLOGY

## 2024-03-13 PROCEDURE — 99459 PELVIC EXAMINATION: CPT | Mod: S$GLB,,, | Performed by: OBSTETRICS & GYNECOLOGY

## 2024-03-13 RX ORDER — ESTRADIOL 0.5 MG/1
0.5 TABLET ORAL DAILY
Qty: 90 TABLET | Refills: 3 | Status: SHIPPED | OUTPATIENT
Start: 2024-03-13 | End: 2025-03-13

## 2024-03-13 NOTE — PROGRESS NOTES
Chief Complaint: Well Woman Exam     HPI:      Cheryle Olivares is a 54 y.o.  who presents today for well woman exam.  LMP: Patient's last menstrual period was 2015 (exact date).  No issues, problems, or complaints. Specifically, patient denies abnormal vaginal bleeding, discharge, pelvic pain, urinary problems, or changes in appetite. Ms. Olivares is currently sexually active with a single male partner.  She declines STD screening today. Has been doing very well on estrace 0.5 mg daily. Wants to try decreasing to 2.5 mg.     Previous Pap: no abnormalities per patients (hyst 2/2 AUB)  Previous Mammogram: BiRads: 2 T-C Score: 8.08% (3/1/24)  Most Recent Colonoscopy: WNL (2021), Repeat in     OB History          2    Para   2    Term   2            AB        Living   2         SAB        IAB        Ectopic        Multiple        Live Births   2               ROS:     GENERAL: Denies unintentional weight gain or weight loss. Feeling well overall.   SKIN: Denies rash or lesions.   HEENT: Denies headaches, or vision changes.   CARDIOVASCULAR: Denies palpitations or chest pain.   RESPIRATORY: Denies shortness of breath or dyspnea on exertion.  BREASTS: Denies lumps or nipple discharge.   ABDOMEN: Denies constipation, diarrhea, nausea, vomiting, change in appetite.  URINARY: Denies frequency, dysuria.  NEUROLOGIC: Denies syncope or weakness.   PSYCHIATRIC: Denies uncontrolled depression or anxiety.    Physical Exam:      PHYSICAL EXAM:  /87   Pulse 75   Wt 79.8 kg (175 lb 14.8 oz)   LMP 2015 (Exact Date)   BMI 30.20 kg/m²   Body mass index is 30.2 kg/m².     APPEARANCE: Well nourished, well developed, in no acute distress.  PSYCH: Appropriate mood and affect.  SKIN: No acne or hirsutism  NECK: Neck symmetric without masses  NODES: No inguinal, axillary, or supraclavicular lymph node enlargement  ABDOMEN: Soft.  No tenderness or masses.    CARDIOVASCULAR: No edema of  peripheral extremities  BREASTS: Symmetrical, no visible skin lesions. No palpable masses. No nipple discharge bilaterally.  PELVIC: Normal external genitalia without lesions.  Normal hair distribution.  Adequate perineal body, normal urethral meatus.  Vagina moist and smooth. Without lesions. Vagina without discharge.  Normal appearing vaginal cuff.  No significant cystocele or rectocele.  Bimanual exam shows no midline or adnexal masses.      Female chaperone present for breast and pelvic exam.     Assessment/Plan:     Encounter for annual routine gynecological examination    estrogen replacement therapy  -     estradioL (ESTRACE) 0.5 MG tablet; Take 1 tablet (0.5 mg total) by mouth once daily.  Dispense: 90 tablet; Refill: 3        -     Decrease to 1/2 tablet daily. If hot flashes or night sweats resume, increase back to 0.5 mg nightly.    Follow up in about 1 year (around 3/13/2025) for Annual Exam.    Counseling:     Patient was counseled today on current ASCCP pap guidelines, the recommendation for yearly physical exams, safe driving habits, breast self awareness and annual mammograms. She is to see her PCP for other health maintenance.     Use of the [facility] Patient Portal discussed and encouraged during today's visit.

## 2024-03-21 ENCOUNTER — PROCEDURE VISIT (OUTPATIENT)
Dept: NEUROLOGY | Facility: CLINIC | Age: 54
End: 2024-03-21
Payer: COMMERCIAL

## 2024-03-21 VITALS
WEIGHT: 178.56 LBS | HEIGHT: 64 IN | SYSTOLIC BLOOD PRESSURE: 126 MMHG | DIASTOLIC BLOOD PRESSURE: 88 MMHG | HEART RATE: 67 BPM | BODY MASS INDEX: 30.48 KG/M2

## 2024-03-21 DIAGNOSIS — G43.719 INTRACTABLE CHRONIC MIGRAINE WITHOUT AURA AND WITHOUT STATUS MIGRAINOSUS: Primary | ICD-10-CM

## 2024-03-21 PROCEDURE — 64615 CHEMODENERV MUSC MIGRAINE: CPT | Mod: S$GLB,,, | Performed by: PSYCHIATRY & NEUROLOGY

## 2024-03-22 ENCOUNTER — TELEPHONE (OUTPATIENT)
Dept: NEUROLOGY | Facility: CLINIC | Age: 54
End: 2024-03-22
Payer: COMMERCIAL

## 2024-03-26 ENCOUNTER — PATIENT MESSAGE (OUTPATIENT)
Dept: PRIMARY CARE CLINIC | Facility: CLINIC | Age: 54
End: 2024-03-26
Payer: COMMERCIAL

## 2024-03-26 NOTE — PROCEDURES
Follow up:   Overall doing well     Prior note:    In last 4 weeks has had more freq migraine   Use of ubrelvy 10 per month     Prior note:   Trigger for migraine - stress   Relieving factor  - exercise      Prior note:   Now has severe migraine 2/month   ubrelvy - partial relief     Prior note:   Overall doing much better   Feels BOTOX wore off 1 week ago     Prior note:   Overall unchanged   Still has nearly daily HAs  Ubrelvy  is effective      Prior note:   S/p PT, dry needling (SCM, occipitalis, trap) - very effective (but lasts only few days)   Clenched teeth - saw dentist, no solutions   HA are now 3/week. Also has days of CDH. Imitrex helps       Prior note:   Base of skull pain at early AM hours - radiates to front + dizziness      PT with dry needling helps when she has clusters of headaches   Stress is a trigger   Still doing yoga, meditation   Using mouth guard      Also reports throat 'spasms'. No benefit from cingulair      Taking Imitrex nearly daily      3/4/20 e-mail: Hello! First, I want to report that I'm still making amazing progress in reducing and almost eliminating my migraines! Physical therapy and stress reduction have been keys. I was recently able to add everyday exercise back into my routine, with few headaches afterward. This is miraculous! I have gone through all of my prescribed PT sessions, and I don't feel I need to continue on a regular basis. However, in order to continue to be physically active, both with at-home PT exercises and regular fitness (running, walking, yoga), it would be helpful to have PT every 2-3 weeks for awhile, as my muscles continue to adjust to better posture and more effective functioning. Is it possible to prescribe additional PT at less frequency? Thank you so much!     Prior note:   Stress -> Neck pain -> HA + nausea   overactivation of trap      Prior note:   Oct 2014 - diag with Hemicran cont (L) - pain points on the L side + photo/phono  Indomethacin  25mg PRN - helped   Currently 4/week Hz      Headache history:   Age of onset -  Childhood   Location - R or L   Nature of pain -  Throbbing   Prodrome - no   Aura -  No   Duration of headache - > 4 hrs   Time to peak intensity - hrs   Pain scale - range of intensity -  8/10  Frequency -  2-3/month   Status Migrainosus history - yes   Time of day of most headaches- anytime      Associated symptoms with the headache:   Meningeal symptoms - photophobia, phonophobia, exercise intolerance +   Nausea/vomitting+   Nasal drainage   Visual blurriness   Pallor/flushing  Dizziness +   Vertigo  Confusion  Impaired concentration +   Pain worsened with physical activity +   Neck pain   Cheek pain on R +      Cluster headache symptoms: none      Symptoms of increased intracranial pressure: none      Basilar migraine symptoms: none      Headache Triggers:   Stress +   Bright or flickering light  Strong smell  Vigorous exercise  Dietary factors   Visual strain   Weather changes +   Exertion  Heat (hot weather, hot baths or showers)  Menses     Other comorbid conditions:  Anxiety - yes   Motion sickness symptom - no      Treatment history:  Resolution of headache with sleep - yes   Meds tried:  H/o Renal stones   Fioricet, relpax, xanax   Flexeril   Medrol dose   maxalt - sluggish   trudhesa -feeling weird   Antiviral - not help   accupressure - helped   accupunture - helped   PT - helps   estradiol 0.05 mg/24 hr td ptsw (VIVELLE-DOT) 0.05 mg/24 hr  - twice weekly patch - helps with hot flashes, now on daily pill   Topamax 25 mg - not help   Gabapentin 100 mg  - drowsy, not help    mobic - helped   BOTOX#1 1/31/22 - dramatic improvement in HA   BOTOX#2 8/4/22 - dramatic improvement in HA    BOTOX#3 8/4/22 - dramatic improvement in HA    BOTOX#4 11/3/22 - dramatic improvement in HA    BOTOX#5 2/2/23 - dramatic improvement in HA    BOTOX#6 5/4/23 - dramatic improvement in HA    BOTOX#7 7/27/23 - dramatic improvement in HA, very mild  ptosis   BOTOX#8 10/6/23 - dramatic improvement in HA, very mild ptosis   BOTOX#9 1/18/24 - dramatic improvement in HA, very mild ptosis     Headache risk factors:   H/o TBI  - no   H/o Meningitis  - no   F/h Aneurysms  - no     Headache burden:   In the last three months:  Days missed from work = several   Days unable to perform activities of daily living = 0  Days unable to attend social activities = several       Review of Systems   Constitutional: Negative.    HENT: Negative.    Eyes: Negative.    Respiratory: Negative.    Cardiovascular: Negative.    Gastrointestinal: Negative.    Endocrine: Negative.    Genitourinary: Negative.    Musculoskeletal: neck pain.    Skin: Negative.    Allergic/Immunologic: Negative.    Neurological: Negative.    Hematological: Negative.    Psychiatric/Behavioral: anxiety.        Objective:   Physical Exam   Constitutional: She is oriented to person, place, and time. She appears well-developed and well-nourished.   Psychiatric: She has a normal mood and affect. Her behavior is normal. Judgment and thought content normal.       Assessment:       1. Intractable chronic migraine without aura   2. Myofacial pain (pressure points)- masseter, neck   3. Tonsillar hernia into foramen magnum      Her HA are unlikely from a TAC and more likely from her tonsillar herniation and posture      Neck and upper chest - vibrating sensation      MRI C spine  Impression       No focal disk abnormalities or significant central canal or neural foraminal stenosis.  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    2-3 punctate foci of T2/flair signal hyperintensity frontal subcortical white matter which are nonspecific and of uncertain clinical significance.    Otherwise unremarkable MRI brain without evidence for acute infarction or enhancing  lesion.    Electronically signed by: TRISH GOFF DO  Date: 10/24/14  Time: 14:27       Degenerative findings:   C2-C3: No spinal canal stenosis or neural foraminal narrowing.   C3-C4: Posterior disc osteophyte complex and left facet joint arthropathy resulting in mild left-sided neural foraminal narrowing.  No spinal canal stenosis.   C4-C5: Posterior disc osteophyte complex with no significant spinal canal stenosis or neural foraminal narrowing.   C5-C6: Posterior disc osteophyte complex with mild spinal canal stenosis.  No neural foraminal narrowing.   C6-C7: Posterior disc osteophyte complex with no significant spinal canal stenosis or neural foraminal narrowing.   C7-T1: No spinal canal stenosis or neural foraminal narrowing.  Redemonstration perineural cysts within the proximal extraforaminal space bilaterally at this level which possibly compressing on the exiting nerve roots.   Paraspinal muscles & soft tissues: Unremarkable.     Impression:   1. Redemonstration of perineural cysts bilaterally at the level of C7-T1, as above.  2. No significant spinal canal stenosis or neural foraminal narrowing throughout the cervical spine, as above.     Electronically signed by resident: Macho Boone  Date:                                            08/20/2020  Time:                                           09:29     Electronically signed by: Mauricio Pitt MD  Date:                                            08/20/2020  Time:                                           09:55      Results for LAISHA HUMPHREY (MRN [id number]) as of 10/11/2019 09:44    Ref. Range 2/5/2019 10:19   Vitamin B-12 Latest Ref Range: 210 - 950 pg/mL >2000 (H)   Thiamine Latest Ref Range: 38 - 122 ug/L 45   Vit D, 25-Hydroxy Latest Ref Range: 30 - 96 ng/mL 75   Vitamin B2 Latest Ref Range: 1 - 19 mcg/L 18   Vitamin B6 Latest Ref Range: 5 - 50 ug/L 131 (H)   Antigliadin Ab IgG Latest Ref Range: <20 UNITS 2   Antigliadin Abs, IgA Latest Ref  Range: <20 UNITS 7   TTG IgA Latest Ref Range: <20 UNITS 9   TTG IgG Latest Ref Range: <20 UNITS 3   Immunoglobulin A (IgA) Latest Ref Range: 70 - 400 mg/dL 172      Plan:   cont estrogen, Singulair, amlodipine   Breakthrough headache - imitrex 100 mg, xanax, mobic, ubrelvy, pain cream   Dentist - Floyd Erickson ([place])     BOTOX treatment response:   Prior to initiating BOTOX, the patient had  24  migraine days per month on average. This meets criteria for chronic migraine.   After starting BOTOX, the patient experienced a reduction in  21  days per month   After starting BOTOX, the patient experienced a reduction in > 100 hours of migraine symptoms per month   After starting BOTOX, the patient experienced a 50% reduction in burden of migraine days per month   Based on this information, BOTOX is medically necessary for the management of the patient's chronic migraine.     BOTOX Injection intervals:   Patient reports a 'wearing off effect' prior to the subsequent BOTOX injections at 12 weeks. This occurs at week 10  Symptoms of this a 'wearing off effect' include - worsening of migraine headache frequency and intensity   Medications used during the 'wearing off effect' period include ubrelvy  Based on this information, it is medically necessary for the patient to receive BOTOX therapy at an interval of every 10 weeks for the management of chronic migraine.     BOTOX was performed as an indicated therapy for intractable chronic migraine headaches given that the patient failed > 2 prophylactic medications     Botulinum Toxin Injection Procedure   Pre-operative diagnosis: Chronic migraine   Post-operative diagnosis: Same   Procedure: Chemical neurolysis   After risks and benefits were explained including bleeding, infection, worsening of pain, damage to the areas being injected, weakness of muscles, loss of muscle control, dysphagia if injecting the head or neck, facial droop if injecting the facial area, painful  injection, allergic or other reaction to the medications being injected, and the failure of the procedure to help the problem, a signed consent was obtained.   The patient was placed in a comfortable area and the sites to be treated were identified.The area to be treated was prepped three times with alcohol and the alcohol allowed to dry. Next, a 30 gauge needle was used to inject the medication in the area to be treated.   Area(s) injected:   Total Botox used: 175 Units   Botox wastage: 25 Units   Injection sites:    muscle bilaterally ( a total of 5 units divided into 2 sites)   Procerus muscle (5 units)   Frontalis muscle bilaterally (a total of 20 units divided into 4 sites)   Temporalis muscle bilaterally (a total of 40 units divided into 8 sites)   Occipitalis muscle bilaterally (a total of 30 units divided into 6 sites)   Cervical paraspinal muscles (a total of 20 units divided into 4 sites)   Trapezius muscle bilaterally (a total of 30 units divided into 6 sites)  Masseter 5 units bel    Mastoid 5 units   Complications: none   RTC for the next Botox injection: 10 weeks          Patient verbalized understanding to plan. I answered all her questions to her satisfaction    Procedures

## 2024-04-03 ENCOUNTER — HOSPITAL ENCOUNTER (OUTPATIENT)
Dept: RADIOLOGY | Facility: HOSPITAL | Age: 54
Discharge: HOME | End: 2024-04-03
Attending: STUDENT IN AN ORGANIZED HEALTH CARE EDUCATION/TRAINING PROGRAM
Payer: COMMERCIAL

## 2024-04-03 DIAGNOSIS — R11.2 NAUSEA AND VOMITING, UNSPECIFIED VOMITING TYPE: ICD-10-CM

## 2024-04-03 DIAGNOSIS — R14.0 ABDOMINAL BLOATING: ICD-10-CM

## 2024-04-03 DIAGNOSIS — R10.30 LOWER ABDOMINAL PAIN: ICD-10-CM

## 2024-04-03 DIAGNOSIS — R10.32 LEFT LOWER QUADRANT PAIN: ICD-10-CM

## 2024-04-03 DIAGNOSIS — K90.41 NON-CELIAC GLUTEN SENSITIVITY: ICD-10-CM

## 2024-04-03 DIAGNOSIS — R10.32 LLQ CRAMPING: ICD-10-CM

## 2024-04-03 DIAGNOSIS — K59.00 CONSTIPATION, UNSPECIFIED CONSTIPATION TYPE: ICD-10-CM

## 2024-04-03 PROCEDURE — 25500020 PHARM REV CODE 255: Performed by: STUDENT IN AN ORGANIZED HEALTH CARE EDUCATION/TRAINING PROGRAM

## 2024-04-03 PROCEDURE — 74177 CT ABD & PELVIS W/CONTRAST: CPT | Mod: 26,,, | Performed by: RADIOLOGY

## 2024-04-03 PROCEDURE — A9698 NON-RAD CONTRAST MATERIALNOC: HCPCS | Performed by: STUDENT IN AN ORGANIZED HEALTH CARE EDUCATION/TRAINING PROGRAM

## 2024-04-03 PROCEDURE — 74177 CT ABD & PELVIS W/CONTRAST: CPT | Mod: TC

## 2024-04-03 RX ADMIN — BARIUM SULFATE 450 ML: 20 SUSPENSION ORAL at 05:04

## 2024-04-03 RX ADMIN — IOHEXOL 75 ML: 350 INJECTION, SOLUTION INTRAVENOUS at 07:04

## 2024-04-04 ENCOUNTER — PATIENT MESSAGE (OUTPATIENT)
Dept: PRIMARY CARE CLINIC | Facility: CLINIC | Age: 54
End: 2024-04-04
Payer: COMMERCIAL

## 2024-04-04 DIAGNOSIS — K76.89 LIVER CYST: ICD-10-CM

## 2024-04-04 DIAGNOSIS — N94.89 ADNEXAL MASS: Primary | ICD-10-CM

## 2024-04-10 ENCOUNTER — HOSPITAL ENCOUNTER (OUTPATIENT)
Dept: RADIOLOGY | Facility: HOSPITAL | Age: 54
Discharge: HOME | End: 2024-04-10
Attending: STUDENT IN AN ORGANIZED HEALTH CARE EDUCATION/TRAINING PROGRAM
Payer: COMMERCIAL

## 2024-04-10 ENCOUNTER — PATIENT MESSAGE (OUTPATIENT)
Dept: PRIMARY CARE CLINIC | Facility: CLINIC | Age: 54
End: 2024-04-10
Payer: COMMERCIAL

## 2024-04-10 DIAGNOSIS — N94.89 ADNEXAL MASS: ICD-10-CM

## 2024-04-10 DIAGNOSIS — N94.89 ADNEXAL MASS: Primary | ICD-10-CM

## 2024-04-10 PROCEDURE — 76856 US EXAM PELVIC COMPLETE: CPT | Mod: TC

## 2024-04-10 PROCEDURE — 76856 US EXAM PELVIC COMPLETE: CPT | Mod: 26,,, | Performed by: RADIOLOGY

## 2024-04-10 NOTE — PROGRESS NOTES
Hi, hope all is well. This pt had a CT scan to look into some abdominal pain she was having. It showed an right adnexal lesion and advised Pelvic US. That pelvc US that showed a 3.9 cm right ovarian lesion favored to be a simple cyst. It recommended a year repeat but was not sure if you wanted to see her for a visit in regards to this or felt a 6m repeat would be more appropriate? Thanks!

## 2024-04-11 ENCOUNTER — LAB VISIT (OUTPATIENT)
Dept: LAB | Facility: HOSPITAL | Age: 54
End: 2024-04-11
Attending: STUDENT IN AN ORGANIZED HEALTH CARE EDUCATION/TRAINING PROGRAM
Payer: COMMERCIAL

## 2024-04-11 ENCOUNTER — PATIENT MESSAGE (OUTPATIENT)
Dept: PRIMARY CARE CLINIC | Facility: CLINIC | Age: 54
End: 2024-04-11
Payer: COMMERCIAL

## 2024-04-11 DIAGNOSIS — N30.00 ACUTE CYSTITIS WITHOUT HEMATURIA: Primary | ICD-10-CM

## 2024-04-11 DIAGNOSIS — N30.00 ACUTE CYSTITIS WITHOUT HEMATURIA: ICD-10-CM

## 2024-04-11 LAB
BILIRUB UR QL STRIP: NEGATIVE
CLARITY UR REFRACT.AUTO: CLEAR
COLOR UR AUTO: YELLOW
GLUCOSE UR QL STRIP: NEGATIVE
HGB UR QL STRIP: NEGATIVE
KETONES UR QL STRIP: NEGATIVE
LEUKOCYTE ESTERASE UR QL STRIP: NEGATIVE
NITRITE UR QL STRIP: NEGATIVE
PH UR STRIP: 7 [PH] (ref 5–8)
PROT UR QL STRIP: NEGATIVE
SP GR UR STRIP: 1.02 (ref 1–1.03)
URN SPEC COLLECT METH UR: NORMAL

## 2024-04-11 PROCEDURE — 81003 URINALYSIS AUTO W/O SCOPE: CPT | Performed by: STUDENT IN AN ORGANIZED HEALTH CARE EDUCATION/TRAINING PROGRAM

## 2024-04-19 ENCOUNTER — OFFICE VISIT (OUTPATIENT)
Dept: PRIMARY CARE CLINIC | Facility: CLINIC | Age: 54
End: 2024-04-19
Payer: COMMERCIAL

## 2024-04-19 ENCOUNTER — PATIENT MESSAGE (OUTPATIENT)
Dept: PRIMARY CARE CLINIC | Facility: CLINIC | Age: 54
End: 2024-04-19

## 2024-04-19 DIAGNOSIS — Z90.710 STATUS POST VAGINAL HYSTERECTOMY: ICD-10-CM

## 2024-04-19 DIAGNOSIS — N94.89 ADNEXAL MASS: ICD-10-CM

## 2024-04-19 DIAGNOSIS — R10.32 LEFT LOWER QUADRANT PAIN: Primary | ICD-10-CM

## 2024-04-19 DIAGNOSIS — Z71.89 COUNSELING FOR ESTROGEN REPLACEMENT THERAPY: ICD-10-CM

## 2024-04-19 DIAGNOSIS — K57.90 DIVERTICULOSIS: ICD-10-CM

## 2024-04-19 DIAGNOSIS — Z80.0 FAMILY HISTORY OF COLON CANCER: ICD-10-CM

## 2024-04-19 DIAGNOSIS — K59.00 CONSTIPATION, UNSPECIFIED CONSTIPATION TYPE: ICD-10-CM

## 2024-04-19 DIAGNOSIS — R14.0 ABDOMINAL BLOATING: ICD-10-CM

## 2024-04-19 PROCEDURE — 99214 OFFICE O/P EST MOD 30 MIN: CPT | Mod: 95,,, | Performed by: STUDENT IN AN ORGANIZED HEALTH CARE EDUCATION/TRAINING PROGRAM

## 2024-04-19 PROCEDURE — 1159F MED LIST DOCD IN RCRD: CPT | Mod: CPTII,95,, | Performed by: STUDENT IN AN ORGANIZED HEALTH CARE EDUCATION/TRAINING PROGRAM

## 2024-04-19 PROCEDURE — 1160F RVW MEDS BY RX/DR IN RCRD: CPT | Mod: CPTII,95,, | Performed by: STUDENT IN AN ORGANIZED HEALTH CARE EDUCATION/TRAINING PROGRAM

## 2024-04-19 NOTE — PROGRESS NOTES
The patient location is: [place]  The chief complaint leading to consultation is: GI issues    Visit type: audiovisual        Cheryle Olivares  1970        Subjective     Chief Complaint: GI upset    History of Present Illness:  Ms. Cheryle Olivares is a 54 y.o. female who presents for virtual visit for f/u.    Since December having episodic GI upset. Lasts 3-5 days at a time. Some diarrhea, constipation, gas, nausea, fullness. Seems cyclical. Improves once she has a BM. Tries to eat bland foods which helps but also causes constipation.   Also tried some OTC tea which helped.    CT A/P-->  Impression:     1. Significant stool burden within the colon.  2. Cystic structure in the right hemipelvis which could represent a small bowel loop or possibly cystic adnexal focus. Recommend dedicated pelvic ultrasound if clinically indicated.  3. Additional findings as above.      Diverticula noted on CT but no diverticulitis.    Avoids gluten.  Eats lots of fiber.  Diet is high in FODMAPs.    Has fam hx of colon cancer in her uncle.   She had colonoscopy in 2021. Due 12/2026    Findings:       The perianal and digital rectal examinations were normal. Pertinent        negatives include normal sphincter tone.        The terminal ileum appeared normal.        A few small and large-mouthed diverticula were found in the sigmoid        colon.        The exam was otherwise without abnormality on direct and        retroflexion views.   Impression:            - Diverticulosis in the sigmoid colon.                          - The examination was otherwise normal on direct                          and retroflexion views.                          - No specimens collected.   Recommendation:        - Discharge patient to home.                          - Resume previous diet.                          - Continue present medications.                          - Repeat colonoscopy in 5 years for surveillance.                          -  Return to referring physician as previously                          scheduled.     On HRT with OBGYN.   Pelvic US-->    Impression:     3.9 cm right ovarian anechoic lesion favored to represent a simple cyst, likely benign.  Follow-up ultrasound is recommended in 1 year.      Not having right sided abdominal pain. More pain on left side. Not new.  Discussed with OBGYN, will repeat Q3m instead of Q1 yr.    Answers submitted by the patient for this visit:  Abdominal Pain Questionnaire (Submitted on 4/19/2024)  Chief Complaint: Abdominal pain  Chronicity: recurrent  Onset: more than 1 month ago  Onset quality: undetermined  Frequency: intermittently  Episode duration: 5 Days  Progression since onset: unchanged  Pain location: LLQ  Pain - numeric: 1/10  Pain quality: sharp  anorexia: No  arthralgias: No  belching: No  flatus: No  hematochezia: No  Relieved by: certain positions, movement, passing flatus, standing  Diagnostic workup: CT scan, ultrasound  Pain severity: mild  Treatments tried: antacids  Improvement on treatment: mild  abdominal surgery: No  colon cancer: No  Crohn's disease: No  gallstones: No  GERD: No  irritable bowel syndrome: No  kidney stones: Yes  pancreatitis: No  PUD: No  ulcerative colitis: No  UTI: Yes      Review of Systems   Constitutional:  Positive for malaise/fatigue. Negative for chills, fever and weight loss.   Gastrointestinal:  Positive for abdominal pain, constipation, diarrhea and nausea. Negative for melena and vomiting.        Episodic, intermittent     Genitourinary:  Negative for dysuria, frequency and hematuria.   Musculoskeletal:  Negative for myalgias.   Neurological:  Negative for speech change, focal weakness, weakness and headaches.   Psychiatric/Behavioral:  The patient is nervous/anxious (Mood changes).         PAST HISTORY:     Past Medical History:   Diagnosis Date    Allergy     Anemia     History of migraine headaches     Migraine headache     Morbid obesity      Preeclampsia, severe 7/15/2013       Past Surgical History:   Procedure Laterality Date    COLONOSCOPY N/A 12/29/2021    Procedure: COLONOSCOPY;  Surgeon: Mahendra Archer MD;  Location: [facility] ([address] [place] [address]);  Service: Endoscopy;  Laterality: N/A;  Pt. is Fully Vaccinated.EC    HYSTERECTOMY      TVH WITHOUT OOPHORECTOMY BLEEDING 12/2015 EK    left knee surgery         Family History   Problem Relation Name Age of Onset    Skin cancer Father      Melanoma Father      Skin cancer Brother      Melanoma Brother      Colon cancer Maternal Uncle      Miscarriages / Stillbirths Sister      Retinal detachment Mother Melissa Romero     Macular degeneration Mother Melissa Romero     Hypertension Mother Melissa Romero     Aortic stenosis Mother Melissa Romero     Hypertension Maternal Grandmother Dolores Diaz     Aortic stenosis Maternal Grandmother Dolores Diaz     Macular degeneration Maternal Grandfather Jayant Diaz     Cataracts Maternal Grandfather Jayant Diaz     Heart attack Maternal Grandfather Jayant Diaz     Heart disease Maternal Grandfather Jayant Diaz     Coronary artery disease Paternal Grandfather      No Known Problems Maternal Aunt      No Known Problems Paternal Aunt      No Known Problems Paternal Uncle      No Known Problems Paternal Grandmother      Amblyopia Neg Hx      Blindness Neg Hx      Cancer Neg Hx      Diabetes Neg Hx      Glaucoma Neg Hx      Strabismus Neg Hx      Stroke Neg Hx      Thyroid disease Neg Hx      Breast cancer Neg Hx      Ovarian cancer Neg Hx           MEDICATIONS & ALLERGIES:     Current Outpatient Medications   Medication Sig Dispense Refill    amLODIPine (NORVASC) 5 MG tablet Take 1 tablet (5 mg total) by mouth once daily. 90 tablet 0    azelastine (ASTELIN) 137 mcg (0.1 %) nasal spray 1 spray (137 mcg total) by Nasal route 2 (two) times daily. 30 mL 11    cetirizine (ZYRTEC) 10 MG tablet Take 1 tablet by mouth once daily.      dihydroergotamine  (TRUDHESA) 0.725 mg/pump act. (4 mg/mL) Spry One spray in each nostril at onset, may repeat in 1 hour if needed. Do not use more than 2 doses within a 24-hour period or 3 doses within 7 days. 6 mL 5    estradioL (ESTRACE) 0.5 MG tablet Take 1 tablet (0.5 mg total) by mouth once daily. 90 tablet 3    fluticasone propionate (FLONASE ALLERGY RELIEF NASL)       MAGNESIUM OXIDE ORAL Take 1 tablet by mouth once daily.      melatonin (MELATIN) 3 mg tablet Take 3 mg by mouth daily as needed. At night as needed      montelukast (SINGULAIR) 10 mg tablet Take 1 tablet (10 mg total) by mouth once daily. 90 tablet 3    multivitamin capsule Take 1 capsule by mouth once daily.      ubrogepant (UBRELVY) 100 mg tablet Take 1 tablet (100 mg total) by mouth as needed for Migraine. If symptoms persist or return, may repeat dose after 2 hours. Maximum: 200 mg per 24 hours 16 tablet 12     Current Facility-Administered Medications   Medication Dose Route Frequency Provider Last Rate Last Admin    onabotulinumtoxina injection 200 Units  200 Units Intramuscular Q90 Days Sergio Granda MD   200 Units at 03/21/24 6789       Review of patient's allergies indicates:   Allergen Reactions    Maxalt [rizatriptan]     Ciprofloxacin Nausea Only       OBJECTIVE:     There is no height or weight on file to calculate BMI.     Physical Exam:  Physical Exam  Constitutional:       General: She is not in acute distress.     Appearance: Normal appearance. She is not ill-appearing, toxic-appearing or diaphoretic.      Comments: Limited 2/2 Virtual Exam   HENT:      Head: Normocephalic and atraumatic.   Eyes:      Conjunctiva/sclera: Conjunctivae normal.   Pulmonary:      Effort: Pulmonary effort is normal. No respiratory distress.   Neurological:      Mental Status: She is alert and oriented to person, place, and time. Mental status is at baseline.   Psychiatric:         Mood and Affect: Mood normal.         Behavior: Behavior normal.             Laboratory  Lab Results   Component Value Date    WBC 4.91 06/01/2022    HGB 13.9 06/01/2022    HCT 42.1 06/01/2022    MCV 93 06/01/2022     06/01/2022     Lab Results   Component Value Date    GLU 88 12/08/2023     12/08/2023    K 3.8 12/08/2023     12/08/2023    CO2 28 12/08/2023    BUN 10 12/08/2023    CREATININE 0.8 12/08/2023    CALCIUM 9.3 12/08/2023    MG 2.3 10/24/2014     Lab Results   Component Value Date    INR 1.0 07/14/2013     Lab Results   Component Value Date    HGBA1C 4.9 06/01/2022             ASSESSMENT & PLAN:   Ms. Cheryle Olivares is a 54 y.o. female who was seen today for f/u. RTC for in person eval. Low FODMAP diet.  GI referral, may need colonoscopy earlier, Q5 yrs. Due in 2026. Consider TCA.  Q3m Pelvic US. Ordered.      1. Left lower quadrant pain  -     Ambulatory referral/consult to Gastroenterology; Future; Expected date: 04/26/2024  -     CBC Auto Differential; Future; Expected date: 04/19/2024  -     Comprehensive Metabolic Panel; Future; Expected date: 04/19/2024  -     Hemoglobin A1C; Future; Expected date: 04/19/2024  -     Lipid Panel; Future; Expected date: 04/19/2024  -     TSH; Future; Expected date: 04/19/2024  -     Celiac Disease Panel; Future; Expected date: 04/19/2024    2. Constipation, unspecified constipation type  -     Ambulatory referral/consult to Gastroenterology; Future; Expected date: 04/26/2024  -     CBC Auto Differential; Future; Expected date: 04/19/2024  -     Comprehensive Metabolic Panel; Future; Expected date: 04/19/2024  -     Hemoglobin A1C; Future; Expected date: 04/19/2024  -     Lipid Panel; Future; Expected date: 04/19/2024  -     TSH; Future; Expected date: 04/19/2024  -     Celiac Disease Panel; Future; Expected date: 04/19/2024    3. Diverticulosis  -     Celiac Disease Panel; Future; Expected date: 04/19/2024    4. Family history of colon cancer  -     Ambulatory referral/consult to Gastroenterology; Future; Expected  date: 04/26/2024  -     Celiac Disease Panel; Future; Expected date: 04/19/2024    5. Abdominal bloating  -     CBC Auto Differential; Future; Expected date: 04/19/2024  -     Comprehensive Metabolic Panel; Future; Expected date: 04/19/2024  -     Hemoglobin A1C; Future; Expected date: 04/19/2024  -     Lipid Panel; Future; Expected date: 04/19/2024  -     TSH; Future; Expected date: 04/19/2024  -     Celiac Disease Panel; Future; Expected date: 04/19/2024    6. Adnexal mass  -     ESTRADIOL; Future; Expected date: 04/19/2024  -     TESTOSTERONE; Future; Expected date: 04/19/2024  -     PROGESTERONE; Future; Expected date: 04/19/2024  -     FOLLICLE STIMULATING HORMONE; Future; Expected date: 04/19/2024    7. estrogen replacement therapy  -     ESTRADIOL; Future; Expected date: 04/19/2024  -     TESTOSTERONE; Future; Expected date: 04/19/2024  -     PROGESTERONE; Future; Expected date: 04/19/2024  -     FOLLICLE STIMULATING HORMONE; Future; Expected date: 04/19/2024    8. Status post vaginal hysterectomy  -     ESTRADIOL; Future; Expected date: 04/19/2024  -     TESTOSTERONE; Future; Expected date: 04/19/2024  -     PROGESTERONE; Future; Expected date: 04/19/2024  -     FOLLICLE STIMULATING HORMONE; Future; Expected date: 04/19/2024         Iva Trujillo MD  Internal Medicine          Time with patient: 20  30 minutes of total time spent on the encounter, which includes face to face time and non-face to face time preparing to see the patient (eg, review of tests), Obtaining and/or reviewing separately obtained history, Documenting clinical information in the electronic or other health record, Independently interpreting results (not separately reported) and communicating results to the patient/family/caregiver, or Care coordination (not separately reported).         Each patient to whom he or she provides medical services by telemedicine is:  (1) informed of the relationship between the physician and patient and the  respective role of any other health care provider with respect to management of the patient; and (2) notified that he or she may decline to receive medical services by telemedicine and may withdraw from such care at any time.    Portions of this note may have been generated using voice recognition software.  Please excuse any spelling/grammatical errors. Occasional wrong-word or sound-a-like substitutions may have also occurred due to the inherent limitations of voice recognition software. Read the chart carefully and recognize, using context, where substitutions have occurred.

## 2024-04-27 ENCOUNTER — LAB VISIT (OUTPATIENT)
Dept: LAB | Facility: HOSPITAL | Age: 54
End: 2024-04-27
Payer: COMMERCIAL

## 2024-04-27 DIAGNOSIS — Z90.710 STATUS POST VAGINAL HYSTERECTOMY: ICD-10-CM

## 2024-04-27 DIAGNOSIS — K59.00 CONSTIPATION, UNSPECIFIED CONSTIPATION TYPE: ICD-10-CM

## 2024-04-27 DIAGNOSIS — Z71.89 COUNSELING FOR ESTROGEN REPLACEMENT THERAPY: ICD-10-CM

## 2024-04-27 DIAGNOSIS — R14.0 ABDOMINAL BLOATING: ICD-10-CM

## 2024-04-27 DIAGNOSIS — N94.89 ADNEXAL MASS: ICD-10-CM

## 2024-04-27 DIAGNOSIS — R10.32 LEFT LOWER QUADRANT PAIN: ICD-10-CM

## 2024-04-27 LAB
ALBUMIN SERPL BCP-MCNC: 3.9 G/DL (ref 3.5–5.2)
ALP SERPL-CCNC: 70 U/L (ref 55–135)
ALT SERPL W/O P-5'-P-CCNC: 33 U/L (ref 10–44)
ANION GAP SERPL CALC-SCNC: 8 MMOL/L (ref 8–16)
AST SERPL-CCNC: 40 U/L (ref 10–40)
BASOPHILS # BLD AUTO: 0.03 K/UL (ref 0–0.2)
BASOPHILS NFR BLD: 0.6 % (ref 0–1.9)
BILIRUB SERPL-MCNC: 0.5 MG/DL (ref 0.1–1)
BUN SERPL-MCNC: 11 MG/DL (ref 6–20)
CALCIUM SERPL-MCNC: 9.4 MG/DL (ref 8.7–10.5)
CHLORIDE SERPL-SCNC: 105 MMOL/L (ref 95–110)
CHOLEST SERPL-MCNC: 256 MG/DL (ref 120–199)
CHOLEST/HDLC SERPL: 2.4 {RATIO} (ref 2–5)
CO2 SERPL-SCNC: 27 MMOL/L (ref 23–29)
CREAT SERPL-MCNC: 0.7 MG/DL (ref 0.5–1.4)
DIFFERENTIAL METHOD BLD: ABNORMAL
EOSINOPHIL # BLD AUTO: 0.1 K/UL (ref 0–0.5)
EOSINOPHIL NFR BLD: 1.7 % (ref 0–8)
ERYTHROCYTE [DISTWIDTH] IN BLOOD BY AUTOMATED COUNT: 13 % (ref 11.5–14.5)
EST. GFR  (NO RACE VARIABLE): >60 ML/MIN/1.73 M^2
ESTIMATED AVG GLUCOSE: 97 MG/DL (ref 68–131)
ESTRADIOL SERPL-MCNC: 33 PG/ML
FSH SERPL-ACNC: 84.28 MIU/ML
GLUCOSE SERPL-MCNC: 87 MG/DL (ref 70–110)
HBA1C MFR BLD: 5 % (ref 4–5.6)
HCT VFR BLD AUTO: 41.4 % (ref 37–48.5)
HDLC SERPL-MCNC: 108 MG/DL (ref 40–75)
HDLC SERPL: 42.2 % (ref 20–50)
HGB BLD-MCNC: 13.8 G/DL (ref 12–16)
IMM GRANULOCYTES # BLD AUTO: 0.01 K/UL (ref 0–0.04)
IMM GRANULOCYTES NFR BLD AUTO: 0.2 % (ref 0–0.5)
LDLC SERPL CALC-MCNC: 138.6 MG/DL (ref 63–159)
LYMPHOCYTES # BLD AUTO: 2.1 K/UL (ref 1–4.8)
LYMPHOCYTES NFR BLD: 44.2 % (ref 18–48)
MCH RBC QN AUTO: 31.4 PG (ref 27–31)
MCHC RBC AUTO-ENTMCNC: 33.3 G/DL (ref 32–36)
MCV RBC AUTO: 94 FL (ref 82–98)
MONOCYTES # BLD AUTO: 0.4 K/UL (ref 0.3–1)
MONOCYTES NFR BLD: 8.2 % (ref 4–15)
NEUTROPHILS # BLD AUTO: 2.1 K/UL (ref 1.8–7.7)
NEUTROPHILS NFR BLD: 45.1 % (ref 38–73)
NONHDLC SERPL-MCNC: 148 MG/DL
NRBC BLD-RTO: 0 /100 WBC
PLATELET # BLD AUTO: 187 K/UL (ref 150–450)
PMV BLD AUTO: 10.4 FL (ref 9.2–12.9)
POTASSIUM SERPL-SCNC: 3.3 MMOL/L (ref 3.5–5.1)
PROGEST SERPL-MCNC: 0.2 NG/ML
PROT SERPL-MCNC: 7 G/DL (ref 6–8.4)
RBC # BLD AUTO: 4.39 M/UL (ref 4–5.4)
SODIUM SERPL-SCNC: 140 MMOL/L (ref 136–145)
TESTOST SERPL-MCNC: 22 NG/DL (ref 5–73)
TRIGL SERPL-MCNC: 47 MG/DL (ref 30–150)
TSH SERPL DL<=0.005 MIU/L-ACNC: 2.27 UIU/ML (ref 0.4–4)
WBC # BLD AUTO: 4.73 K/UL (ref 3.9–12.7)

## 2024-04-27 PROCEDURE — 83001 ASSAY OF GONADOTROPIN (FSH): CPT | Performed by: STUDENT IN AN ORGANIZED HEALTH CARE EDUCATION/TRAINING PROGRAM

## 2024-04-27 PROCEDURE — 85025 COMPLETE CBC W/AUTO DIFF WBC: CPT | Performed by: STUDENT IN AN ORGANIZED HEALTH CARE EDUCATION/TRAINING PROGRAM

## 2024-04-27 PROCEDURE — 36415 COLL VENOUS BLD VENIPUNCTURE: CPT | Performed by: STUDENT IN AN ORGANIZED HEALTH CARE EDUCATION/TRAINING PROGRAM

## 2024-04-27 PROCEDURE — 84144 ASSAY OF PROGESTERONE: CPT | Performed by: STUDENT IN AN ORGANIZED HEALTH CARE EDUCATION/TRAINING PROGRAM

## 2024-04-27 PROCEDURE — 82670 ASSAY OF TOTAL ESTRADIOL: CPT | Performed by: STUDENT IN AN ORGANIZED HEALTH CARE EDUCATION/TRAINING PROGRAM

## 2024-04-27 PROCEDURE — 84403 ASSAY OF TOTAL TESTOSTERONE: CPT | Performed by: STUDENT IN AN ORGANIZED HEALTH CARE EDUCATION/TRAINING PROGRAM

## 2024-04-27 PROCEDURE — 83036 HEMOGLOBIN GLYCOSYLATED A1C: CPT | Performed by: STUDENT IN AN ORGANIZED HEALTH CARE EDUCATION/TRAINING PROGRAM

## 2024-04-27 PROCEDURE — 80061 LIPID PANEL: CPT | Performed by: STUDENT IN AN ORGANIZED HEALTH CARE EDUCATION/TRAINING PROGRAM

## 2024-04-27 PROCEDURE — 84443 ASSAY THYROID STIM HORMONE: CPT | Performed by: STUDENT IN AN ORGANIZED HEALTH CARE EDUCATION/TRAINING PROGRAM

## 2024-04-27 PROCEDURE — 80053 COMPREHEN METABOLIC PANEL: CPT | Performed by: STUDENT IN AN ORGANIZED HEALTH CARE EDUCATION/TRAINING PROGRAM

## 2024-04-30 DIAGNOSIS — E87.6 LOW BLOOD POTASSIUM: Primary | ICD-10-CM

## 2024-04-30 RX ORDER — POTASSIUM CHLORIDE 750 MG/1
10 TABLET, EXTENDED RELEASE ORAL DAILY
Qty: 5 TABLET | Refills: 0 | Status: SHIPPED | OUTPATIENT
Start: 2024-04-30 | End: 2024-05-05

## 2024-05-07 ENCOUNTER — TELEPHONE (OUTPATIENT)
Dept: PRIMARY CARE CLINIC | Facility: CLINIC | Age: 54
End: 2024-05-07
Payer: COMMERCIAL

## 2024-05-14 ENCOUNTER — PATIENT MESSAGE (OUTPATIENT)
Dept: PRIMARY CARE CLINIC | Facility: CLINIC | Age: 54
End: 2024-05-14

## 2024-05-14 ENCOUNTER — OFFICE VISIT (OUTPATIENT)
Dept: PRIMARY CARE CLINIC | Facility: CLINIC | Age: 54
End: 2024-05-14
Payer: COMMERCIAL

## 2024-05-14 VITALS
WEIGHT: 175.06 LBS | SYSTOLIC BLOOD PRESSURE: 118 MMHG | HEIGHT: 64 IN | OXYGEN SATURATION: 99 % | DIASTOLIC BLOOD PRESSURE: 66 MMHG | BODY MASS INDEX: 29.89 KG/M2 | HEART RATE: 68 BPM

## 2024-05-14 DIAGNOSIS — E78.2 MIXED HYPERLIPIDEMIA: ICD-10-CM

## 2024-05-14 DIAGNOSIS — N20.0 LEFT RENAL STONE: ICD-10-CM

## 2024-05-14 DIAGNOSIS — K76.89 LIVER CYST: ICD-10-CM

## 2024-05-14 DIAGNOSIS — N83.201 RIGHT OVARIAN CYST: ICD-10-CM

## 2024-05-14 DIAGNOSIS — N94.89 ADNEXAL MASS: ICD-10-CM

## 2024-05-14 DIAGNOSIS — K57.90 DIVERTICULOSIS: ICD-10-CM

## 2024-05-14 DIAGNOSIS — R10.32 LEFT LOWER QUADRANT PAIN: Primary | ICD-10-CM

## 2024-05-14 DIAGNOSIS — K22.4 ESOPHAGEAL SPASM: ICD-10-CM

## 2024-05-14 PROCEDURE — 99214 OFFICE O/P EST MOD 30 MIN: CPT | Mod: S$GLB,,, | Performed by: STUDENT IN AN ORGANIZED HEALTH CARE EDUCATION/TRAINING PROGRAM

## 2024-05-14 PROCEDURE — 3074F SYST BP LT 130 MM HG: CPT | Mod: CPTII,S$GLB,, | Performed by: STUDENT IN AN ORGANIZED HEALTH CARE EDUCATION/TRAINING PROGRAM

## 2024-05-14 PROCEDURE — 99999 PR PBB SHADOW E&M-EST. PATIENT-LVL IV: CPT | Mod: PBBFAC,,, | Performed by: STUDENT IN AN ORGANIZED HEALTH CARE EDUCATION/TRAINING PROGRAM

## 2024-05-14 PROCEDURE — 3008F BODY MASS INDEX DOCD: CPT | Mod: CPTII,S$GLB,, | Performed by: STUDENT IN AN ORGANIZED HEALTH CARE EDUCATION/TRAINING PROGRAM

## 2024-05-14 PROCEDURE — 1159F MED LIST DOCD IN RCRD: CPT | Mod: CPTII,S$GLB,, | Performed by: STUDENT IN AN ORGANIZED HEALTH CARE EDUCATION/TRAINING PROGRAM

## 2024-05-14 PROCEDURE — 3044F HG A1C LEVEL LT 7.0%: CPT | Mod: CPTII,S$GLB,, | Performed by: STUDENT IN AN ORGANIZED HEALTH CARE EDUCATION/TRAINING PROGRAM

## 2024-05-14 PROCEDURE — 1160F RVW MEDS BY RX/DR IN RCRD: CPT | Mod: CPTII,S$GLB,, | Performed by: STUDENT IN AN ORGANIZED HEALTH CARE EDUCATION/TRAINING PROGRAM

## 2024-05-14 PROCEDURE — 3078F DIAST BP <80 MM HG: CPT | Mod: CPTII,S$GLB,, | Performed by: STUDENT IN AN ORGANIZED HEALTH CARE EDUCATION/TRAINING PROGRAM

## 2024-05-14 RX ORDER — PANTOPRAZOLE SODIUM 40 MG/1
40 TABLET, DELAYED RELEASE ORAL DAILY
Qty: 90 TABLET | Refills: 0 | Status: SHIPPED | OUTPATIENT
Start: 2024-05-14

## 2024-05-14 NOTE — PROGRESS NOTES
Cheryle Olivares  1970        Subjective     Chief Complaint: F/u    History of Present Illness:  Ms. Cheryle Olivares is a 54 y.o. female who presents to clinic for F/u LLQ discomfort.     Resolves with passing gas. Avoiding high FODMAP foods has helped. Limiting dairy has helped.   Has not had episode recently.  Has fam hx of colon cancer in her uncle.   She had colonoscopy in 2021. Due 12/2026  Also feels like she has esophageal spasms. Saw ENT, laryngoscopy done few years ago. Advised singulair, Flonase, Xyrtec.    GI appt 5/24.    Colon- done 12/2021.  Q5 yrs.   Impression:            - Diverticulosis in the sigmoid colon.                          - The examination was otherwise normal on direct                          and retroflexion views.                          - No specimens collected.   Recommendation:        - Discharge patient to home.                          - Resume previous diet.                          - Continue present medications.                          - Repeat colonoscopy in 5 years for surveillance.                          - Return to referring physician as previously                          scheduled.   Attending Participation:     CT ABDOMEN PELVIS WITH IV CONTRAST    CLINICAL HISTORY:  LLQ abdominal pain;Nausea/vomiting; Abdominal distension (gaseous)  COMPARISON:  CT renal stone study 08/20/2017     FINDINGS:  SOFT TISSUES: Unremarkable.     LUNG BASES/VISUALIZED MEDIASTINUM: Bibasilar dependent atelectasis.  Visualized heart is unremarkable.     HEPATOBILIARY: Liver is normal size. Subcentimeter hypodensity in the left hepatic lobe (series 2, image 33), too small to characterize but unchanged.  No biliary ductal dilatation.  Gallbladder is decompressed and incompletely evaluated.     PANCREAS: No focal masses or ductal dilatation.     SPLEEN: Normal size.     ADRENALS: No adrenal nodules.     KIDNEYS/URETERS: Kidneys are normal size and location and enhance  symmetrically.  5 mm nonobstructive stone in the lower pole of left kidney.  No hydronephrosis.  No solid mass lesions. Ureters are difficult to follow along their course, however visualized portions are unremarkable.     BLADDER/PELVIC ORGANS: Urinary bladder is unremarkable.  Hysterectomy.  3.5 x 2.0 cm cystic structure in the right pelvis (series 2, image 145, which could represent a small bowel loop or cystic adnexal focus.     PERITONEUM / RETROPERITONEUM: No free air or fluid.     LYMPH NODES: No lymphadenopathy.     VESSELS: Aorta maintains normal course and caliber with minimal calcific atherosclerosis.  Portal vein, splenic vein, and SMV are patent.     GI TRACT: Stomach and duodenum are unremarkable.  No evidence of bowel obstruction or inflammation.  The appendix is not definitively visualized.  Significant stool burden within the colon.  Few colonic diverticula without evidence of diverticulitis.     BONES: No acute fractures or suspicious osseous lesions.  There is a limbus vertebral at level of L5.     Impression:     1. Significant stool burden within the colon.  2. Cystic structure in the right hemipelvis which could represent a small bowel loop or possibly cystic adnexal focus. Recommend dedicated pelvic ultrasound if clinically indicated.  3. Additional findings as above.      Has 3m US pelvis ordered.     HLD- not on statin. Was eating lots of eggs.      Review of Systems   Constitutional:  Negative for chills, fever, malaise/fatigue and weight loss.   HENT:  Negative for congestion.    Cardiovascular:  Negative for leg swelling.   Gastrointestinal:  Positive for abdominal pain (LLQ, intermittent). Negative for blood in stool and vomiting.   Genitourinary:  Negative for dysuria and flank pain.   Neurological:  Negative for sensory change and speech change.   Psychiatric/Behavioral:  The patient is not nervous/anxious.         PAST HISTORY:     Past Medical History:   Diagnosis Date    Allergy      Anemia     History of migraine headaches     Migraine headache     Morbid obesity     Preeclampsia, severe 7/15/2013       Past Surgical History:   Procedure Laterality Date    COLONOSCOPY N/A 12/29/2021    Procedure: COLONOSCOPY;  Surgeon: Mahendra Archer MD;  Location: [facility] ([address] [place] [address]);  Service: Endoscopy;  Laterality: N/A;  Pt. is Fully Vaccinated.EC    HYSTERECTOMY      TVH WITHOUT OOPHORECTOMY BLEEDING 12/2015 EK    left knee surgery         Family History   Problem Relation Name Age of Onset    Skin cancer Father      Melanoma Father      Skin cancer Brother      Melanoma Brother      Colon cancer Maternal Uncle      Miscarriages / Stillbirths Sister      Retinal detachment Mother Melissa Romero     Macular degeneration Mother Melissa Romero     Hypertension Mother Melissa Romero     Aortic stenosis Mother Melissa Romero     Hypertension Maternal Grandmother Dolores Severinoreiner     Aortic stenosis Maternal Grandmother Dolores Diaz     Macular degeneration Maternal Grandfather Jayant Diaz     Cataracts Maternal Grandfather Jayant Diaz     Heart attack Maternal Grandfather Jayant Diaz     Heart disease Maternal Grandfather Jayant Diaz     Coronary artery disease Paternal Grandfather      No Known Problems Maternal Aunt      No Known Problems Paternal Aunt      No Known Problems Paternal Uncle      No Known Problems Paternal Grandmother      Amblyopia Neg Hx      Blindness Neg Hx      Cancer Neg Hx      Diabetes Neg Hx      Glaucoma Neg Hx      Strabismus Neg Hx      Stroke Neg Hx      Thyroid disease Neg Hx      Breast cancer Neg Hx      Ovarian cancer Neg Hx         Social History     Socioeconomic History    Marital status:    Occupational History    Occupation: director     Employer: other/[employer]   Tobacco Use    Smoking status: Never    Smokeless tobacco: Never   Substance and Sexual Activity    Alcohol use: No    Drug use: No    Sexual activity: Yes      Partners: Male     Birth control/protection: See Surgical Hx, None   Other Topics Concern    Are you pregnant or think you may be? 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  She is self-employed.  She is working from home.         Her  is the  of a [employer] school on the [facility] [employer] [facility] which has 1000 students ages K. Through 8.  She  in 2010.  They have 2 daughters.    She had a previous marriage which ended in divorce        Likes to swim, walk, elliptical, yoga, Pilates for exercise.     Social Determinants of Health     Financial Resource Strain: Low Risk  (12/7/2023)    Overall Financial Resource Strain (CARDIA)     Difficulty of Paying Living Expenses: Not hard at all   Food Insecurity: No Food Insecurity (12/7/2023)    Hunger Vital Sign     Worried About Running Out of Food in the Last Year: Never true     Ran Out of Food in the Last Year: Never true   Transportation Needs: No Transportation Needs (12/7/2023)    PRAPARE - Transportation     Lack of Transportation (Medical): No     Lack of Transportation (Non-Medical): No   Physical Activity: Insufficiently Active (12/7/2023)    Exercise Vital Sign     Days of Exercise per Week: 5 days     Minutes of Exercise per Session: 20 min   Stress: Stress Concern Present (12/7/2023)    [place] [employer] - Occupational Stress Questionnaire     Feeling of Stress : To some extent   Housing Stability: Low Risk  (12/7/2023)    Housing Stability Vital Sign     Unable to Pay for Housing in the Last Year: No     Number of Places Lived in the Last Year: 1     Unstable Housing in the Last Year: No       MEDICATIONS & ALLERGIES:     Current Outpatient Medications on File Prior to Visit   Medication Sig    amLODIPine (NORVASC) 5 MG tablet Take 1 tablet (5 mg total) by  "mouth once daily.    azelastine (ASTELIN) 137 mcg (0.1 %) nasal spray 1 spray (137 mcg total) by Nasal route 2 (two) times daily.    cetirizine (ZYRTEC) 10 MG tablet Take 1 tablet by mouth once daily.    dihydroergotamine (TRUDHESA) 0.725 mg/pump act. (4 mg/mL) Spry One spray in each nostril at onset, may repeat in 1 hour if needed. Do not use more than 2 doses within a 24-hour period or 3 doses within 7 days.    estradioL (ESTRACE) 0.5 MG tablet Take 1 tablet (0.5 mg total) by mouth once daily.    fluticasone propionate (FLONASE ALLERGY RELIEF NASL)     MAGNESIUM OXIDE ORAL Take 1 tablet by mouth once daily.    melatonin (MELATIN) 3 mg tablet Take 3 mg by mouth daily as needed. At night as needed    montelukast (SINGULAIR) 10 mg tablet Take 1 tablet (10 mg total) by mouth once daily.    multivitamin capsule Take 1 capsule by mouth once daily.    ubrogepant (UBRELVY) 100 mg tablet Take 1 tablet (100 mg total) by mouth as needed for Migraine. If symptoms persist or return, may repeat dose after 2 hours. Maximum: 200 mg per 24 hours     Current Facility-Administered Medications on File Prior to Visit   Medication    onabotulinumtoxina injection 200 Units       Review of patient's allergies indicates:   Allergen Reactions    Maxalt [rizatriptan]     Ciprofloxacin Nausea Only       OBJECTIVE:     Vital Signs:  Vitals:    05/14/24 1429   BP: 118/66   BP Location: Right arm   Patient Position: Sitting   BP Method: Medium (Manual)   Pulse: 68   SpO2: 99%   Weight: 79.4 kg (175 lb 0.7 oz)   Height: 5' 4" (1.626 m)       Body mass index is 30.05 kg/m².     Physical Exam:  Physical Exam  Vitals and nursing note reviewed.   Constitutional:       General: She is not in acute distress.     Appearance: Normal appearance. She is not ill-appearing, toxic-appearing or diaphoretic.   HENT:      Head: Normocephalic and atraumatic.   Eyes:      General: No scleral icterus.        Right eye: No discharge.         Left eye: No discharge. "      Conjunctiva/sclera: Conjunctivae normal.   Pulmonary:      Effort: Pulmonary effort is normal. No respiratory distress.   Abdominal:      General: There is no distension.      Palpations: Abdomen is soft.      Tenderness: There is no abdominal tenderness. There is no right CVA tenderness, left CVA tenderness, guarding or rebound.   Musculoskeletal:         General: Normal range of motion.      Right lower leg: No edema.      Left lower leg: No edema.   Skin:     General: Skin is warm and dry.   Neurological:      Mental Status: She is alert and oriented to person, place, and time. Mental status is at baseline.   Psychiatric:         Mood and Affect: Mood normal.         Behavior: Behavior normal.            Laboratory  Lab Results   Component Value Date    WBC 4.73 04/27/2024    HGB 13.8 04/27/2024    HCT 41.4 04/27/2024    MCV 94 04/27/2024     04/27/2024     Lab Results   Component Value Date    GLU 87 04/27/2024     04/27/2024    K 3.3 (L) 04/27/2024     04/27/2024    CO2 27 04/27/2024    BUN 11 04/27/2024    CREATININE 0.7 04/27/2024    CALCIUM 9.4 04/27/2024    MG 2.3 10/24/2014     Lab Results   Component Value Date    INR 1.0 07/14/2013     Lab Results   Component Value Date    HGBA1C 5.0 04/27/2024           Health Maintenance         Date Due Completion Date    Pneumococcal Vaccines (Age 0-64) (1 of 2 - PCV) Never done ---    COVID-19 Vaccine (5 - 2023-24 season) 09/01/2023 6/18/2022    Mammogram 03/01/2025 3/1/2024    Colorectal Cancer Screening 12/29/2026 12/29/2021    Hemoglobin A1c (Diabetic Prevention Screening) 04/27/2027 4/27/2024    Lipid Panel 04/27/2029 4/27/2024    TETANUS VACCINE 11/06/2032 11/6/2022              ASSESSMENT & PLAN:   Ms. Cheryle Olivares is a 54 y.o. female who was seen today in clinic for f/u. Unclear etiology. Mild and intermittent LLQ pain. Seems to have found the most improvement with a low FODMAP diet, potential IBS?  Discussed trial TCA vs  Bentyl vs SSRI.  She has a GI appointment coming up.  Reasonable to await evaluation prior to starting medications. May need C-scope sooner than 2026. Consider EGD as well with hx of possible esophogeal spasm? Trial PPI. CT scan with right adnexal mass, 3m ultrasound ordered, we will help schedule. Her OBGYN has been notified. Can follow up liver lesion with 6m ultrasound in abundance of caution.  Continued f/u pending GI evaluation.         1. Left lower quadrant pain    2. Diverticulosis    3. Adnexal mass    4. Right ovarian cyst    5. Liver cyst    6. Left renal stone    7. Esophageal spasm  -     pantoprazole (PROTONIX) 40 MG tablet; Take 1 tablet (40 mg total) by mouth once daily.  Dispense: 90 tablet; Refill: 0    8. Mixed hyperlipidemia           Iva Trujillo MD  Internal Medicine         Portions of this note may have been generated using voice recognition software.  Please excuse any spelling/grammatical errors. Occasional wrong-word or sound-a-like substitutions may have also occurred due to the inherent limitations of voice recognition software. Please read the chart carefully and recognize, using context, where substitutions have occurred.

## 2024-05-24 ENCOUNTER — OFFICE VISIT (OUTPATIENT)
Dept: GASTROENTEROLOGY | Facility: CLINIC | Age: 54
End: 2024-05-24
Payer: COMMERCIAL

## 2024-05-24 VITALS — WEIGHT: 173.75 LBS | BODY MASS INDEX: 29.82 KG/M2

## 2024-05-24 DIAGNOSIS — R10.32 LEFT LOWER QUADRANT PAIN: ICD-10-CM

## 2024-05-24 DIAGNOSIS — K59.00 CONSTIPATION, UNSPECIFIED CONSTIPATION TYPE: ICD-10-CM

## 2024-05-24 DIAGNOSIS — Z80.0 FAMILY HISTORY OF COLON CANCER: ICD-10-CM

## 2024-05-24 PROCEDURE — 3008F BODY MASS INDEX DOCD: CPT | Mod: CPTII,S$GLB,,

## 2024-05-24 PROCEDURE — 1159F MED LIST DOCD IN RCRD: CPT | Mod: CPTII,S$GLB,,

## 2024-05-24 PROCEDURE — 99214 OFFICE O/P EST MOD 30 MIN: CPT | Mod: S$GLB,,,

## 2024-05-24 PROCEDURE — 3044F HG A1C LEVEL LT 7.0%: CPT | Mod: CPTII,S$GLB,,

## 2024-05-24 PROCEDURE — 99999 PR PBB SHADOW E&M-EST. PATIENT-LVL IV: CPT | Mod: PBBFAC,,,

## 2024-05-24 NOTE — PROGRESS NOTES
GASTROENTEROLOGY CLINIC NOTE    Reason for visit: Diagnoses of Left lower quadrant pain, Constipation, unspecified constipation type, and Family history of colon cancer were pertinent to this visit.  Referring provider/PCP: Iva Trujillo MD    HPI:  Cheryle Olivares is a 54 y.o. female here today for LLQ pain. She reports having similar pain when she was 40, had colonoscopy at that time and had been without pain until recently. She reports pain is intermittent in LLQ- sharp, can be gas pain. Recently had CT scan significant for large stool burden. She reports having BM's intermittently, not always emptying and having to strain. She has tried senna tea and stool softener/laxative which improves BM's and also pain. Improvement of pain with low FODMAP diet as well. No blood. Last colonoscopy in 2021- normal, 5 year recall d/t FH of CRC in uncle. She also has esophageal spasms- will feel fluttering in her throat, unsure if related to eating/drinking. Started on pantoprazole and feels it may be helping. No classic reflux symptoms, no dysphagia or odynphagia.     Prior Endoscopy:  EGD:  Colon: 2021 with Dr. Archer:   - Diverticulosis in the sigmoid colon.                - The examination was otherwise normal on direct and retroflexion views.                - No specimens collected.    - Repeat colonoscopy in 5 years for surveillance.     (Portions of this note were dictated using voice recognition software and may contain dictation related errors in spelling/grammar/syntax not found on text review)    Review of Systems   Gastrointestinal:  Positive for abdominal pain (intermittent) and constipation. Negative for heartburn.       Past Medical History: has a past medical history of Allergy, Anemia, History of migraine headaches, Migraine headache, Morbid obesity, and Preeclampsia, severe.    Past Surgical History: has a past surgical history that includes left knee surgery; Hysterectomy; and Colonoscopy (N/A,  12/29/2021).    Home medications:   Current Outpatient Medications on File Prior to Visit   Medication Sig Dispense Refill    amLODIPine (NORVASC) 5 MG tablet Take 1 tablet (5 mg total) by mouth once daily. 90 tablet 0    azelastine (ASTELIN) 137 mcg (0.1 %) nasal spray 1 spray (137 mcg total) by Nasal route 2 (two) times daily. 30 mL 11    cetirizine (ZYRTEC) 10 MG tablet Take 1 tablet by mouth once daily.      dihydroergotamine (TRUDHESA) 0.725 mg/pump act. (4 mg/mL) Spry One spray in each nostril at onset, may repeat in 1 hour if needed. Do not use more than 2 doses within a 24-hour period or 3 doses within 7 days. 6 mL 5    estradioL (ESTRACE) 0.5 MG tablet Take 1 tablet (0.5 mg total) by mouth once daily. 90 tablet 3    fluticasone propionate (FLONASE ALLERGY RELIEF NASL)       MAGNESIUM OXIDE ORAL Take 1 tablet by mouth once daily.      melatonin (MELATIN) 3 mg tablet Take 3 mg by mouth daily as needed. At night as needed      montelukast (SINGULAIR) 10 mg tablet Take 1 tablet (10 mg total) by mouth once daily. 90 tablet 3    multivitamin capsule Take 1 capsule by mouth once daily.      pantoprazole (PROTONIX) 40 MG tablet Take 1 tablet (40 mg total) by mouth once daily. 90 tablet 0    ubrogepant (UBRELVY) 100 mg tablet Take 1 tablet (100 mg total) by mouth as needed for Migraine. If symptoms persist or return, may repeat dose after 2 hours. Maximum: 200 mg per 24 hours 16 tablet 12     Current Facility-Administered Medications on File Prior to Visit   Medication Dose Route Frequency Provider Last Rate Last Admin    onabotulinumtoxina injection 200 Units  200 Units Intramuscular Q90 Days Sergio Granda MD   200 Units at 03/21/24 1359       Vital signs:  Wt 78.8 kg (173 lb 11.6 oz)   LMP 11/27/2015 (Exact Date)   BMI 29.82 kg/m²     Physical Exam  Constitutional:       Appearance: Normal appearance. She is normal weight.   Abdominal:      General: Abdomen is flat. There is no distension.   Neurological:       Mental Status: She is alert.       Results for orders placed or performed during the hospital encounter of 04/03/24 (from the past 2160 hour(s))   CT Abdomen Pelvis With IV Contrast Routine Oral Contrast    Narrative    EXAMINATION:  CT ABDOMEN PELVIS WITH IV CONTRAST    CLINICAL HISTORY:  LLQ abdominal pain;Nausea/vomiting; Abdominal distension (gaseous)    TECHNIQUE:  Low dose axial images, sagittal and coronal reformations were obtained from the lung bases to the pubic symphysis following the IV administration of 75 mL of Omnipaque 350 and the oral administration of 450 mL of barium sulfate.    COMPARISON:  CT renal stone study 08/20/2017    FINDINGS:  SOFT TISSUES: Unremarkable.    LUNG BASES/VISUALIZED MEDIASTINUM: Bibasilar dependent atelectasis.  Visualized heart is unremarkable.    HEPATOBILIARY: Liver is normal size. Subcentimeter hypodensity in the left hepatic lobe (series 2, image 33), too small to characterize but unchanged.  No biliary ductal dilatation.  Gallbladder is decompressed and incompletely evaluated.    PANCREAS: No focal masses or ductal dilatation.    SPLEEN: Normal size.    ADRENALS: No adrenal nodules.    KIDNEYS/URETERS: Kidneys are normal size and location and enhance symmetrically.  5 mm nonobstructive stone in the lower pole of left kidney.  No hydronephrosis.  No solid mass lesions. Ureters are difficult to follow along their course, however visualized portions are unremarkable.    BLADDER/PELVIC ORGANS: Urinary bladder is unremarkable.  Hysterectomy.  3.5 x 2.0 cm cystic structure in the right pelvis (series 2, image 145, which could represent a small bowel loop or cystic adnexal focus.    PERITONEUM / RETROPERITONEUM: No free air or fluid.    LYMPH NODES: No lymphadenopathy.    VESSELS: Aorta maintains normal course and caliber with minimal calcific atherosclerosis.  Portal vein, splenic vein, and SMV are patent.    GI TRACT: Stomach and duodenum are unremarkable.  No evidence of  bowel obstruction or inflammation.  The appendix is not definitively visualized.  Significant stool burden within the colon.  Few colonic diverticula without evidence of diverticulitis.    BONES: No acute fractures or suspicious osseous lesions.  There is a limbus vertebral at level of L5.      Impression    1. Significant stool burden within the colon.  2. Cystic structure in the right hemipelvis which could represent a small bowel loop or possibly cystic adnexal focus. Recommend dedicated pelvic ultrasound if clinically indicated.  3. Additional findings as above.    Electronically signed by resident: Daniel Lopez  Date:    04/04/2024  Time:    08:04    Electronically signed by: Jonny Taylor MD  Date:    04/04/2024  Time:    08:44      I have reviewed associated labs, imaging and notes.     Assessment:  1. Left lower quadrant pain    2. Constipation, unspecified constipation type    3. Family history of colon cancer    LLQ pain, intermittent   Sharp pain, sometimes gas   CT- significant stool burden  Constipation   Not always emptying, straining   Improves with senna or stool softener/laxative  UTD on CRC screening  Esoph spasms??   No reflux, occurring randomly   No dysphagia or odynphagia    May be relieved with PPI     Suspect LLQ pain related to underlying constipation. Start taking senna or stool softener daily to improve BM's. Continue with low FODMAP until BM's normalize. No warning signs present. Continue PPI for 1 month to see if improvement of spasms. No reflux symptoms, could consider EGD with bravo if persists after trial of PPI.      Plan:     Start taking senna or stool softener daily   Continue low FODMAP until BM's regulate   Can then increase dietary fiber/start fiber supplement  Continue pantoprazole daily for 1 month   If symptoms return, consider EGD + Paez    Lindsey Ray, NP [facility] [place]

## 2024-05-27 ENCOUNTER — PROCEDURE VISIT (OUTPATIENT)
Dept: NEUROLOGY | Facility: CLINIC | Age: 54
End: 2024-05-27
Payer: COMMERCIAL

## 2024-05-27 VITALS
DIASTOLIC BLOOD PRESSURE: 83 MMHG | HEIGHT: 64 IN | HEART RATE: 74 BPM | WEIGHT: 176.38 LBS | BODY MASS INDEX: 30.11 KG/M2 | SYSTOLIC BLOOD PRESSURE: 118 MMHG

## 2024-05-27 DIAGNOSIS — G43.709 CHRONIC MIGRAINE W/O AURA W/O STATUS MIGRAINOSUS, NOT INTRACTABLE: Primary | ICD-10-CM

## 2024-05-27 PROCEDURE — 64615 CHEMODENERV MUSC MIGRAINE: CPT | Mod: S$GLB,,, | Performed by: PSYCHIATRY & NEUROLOGY

## 2024-05-27 NOTE — PROCEDURES
Follow up:   Overall doing well     Prior note:    In last 4 weeks has had more freq migraine   Use of ubrelvy 10 per month     Prior note:   Trigger for migraine - stress   Relieving factor  - exercise      Prior note:   Now has severe migraine 2/month   ubrelvy - partial relief     Prior note:   Overall doing much better   Feels BOTOX wore off 1 week ago     Prior note:   Overall unchanged   Still has nearly daily HAs  Ubrelvy  is effective      Prior note:   S/p PT, dry needling (SCM, occipitalis, trap) - very effective (but lasts only few days)   Clenched teeth - saw dentist, no solutions   HA are now 3/week. Also has days of CDH. Imitrex helps       Prior note:   Base of skull pain at early AM hours - radiates to front + dizziness      PT with dry needling helps when she has clusters of headaches   Stress is a trigger   Still doing yoga, meditation   Using mouth guard      Also reports throat 'spasms'. No benefit from cingulair      Taking Imitrex nearly daily      3/4/20 e-mail: Hello! First, I want to report that I'm still making amazing progress in reducing and almost eliminating my migraines! Physical therapy and stress reduction have been keys. I was recently able to add everyday exercise back into my routine, with few headaches afterward. This is miraculous! I have gone through all of my prescribed PT sessions, and I don't feel I need to continue on a regular basis. However, in order to continue to be physically active, both with at-home PT exercises and regular fitness (running, walking, yoga), it would be helpful to have PT every 2-3 weeks for awhile, as my muscles continue to adjust to better posture and more effective functioning. Is it possible to prescribe additional PT at less frequency? Thank you so much!     Prior note:   Stress -> Neck pain -> HA + nausea   overactivation of trap      Prior note:   Oct 2014 - diag with Hemicran cont (L) - pain points on the L side + photo/phono  Indomethacin  25mg PRN - helped   Currently 4/week Hz      Headache history:   Age of onset -  Childhood   Location - R or L   Nature of pain -  Throbbing   Prodrome - no   Aura -  No   Duration of headache - > 4 hrs   Time to peak intensity - hrs   Pain scale - range of intensity -  8/10  Frequency -  2-3/month   Status Migrainosus history - yes   Time of day of most headaches- anytime      Associated symptoms with the headache:   Meningeal symptoms - photophobia, phonophobia, exercise intolerance +   Nausea/vomitting+   Nasal drainage   Visual blurriness   Pallor/flushing  Dizziness +   Vertigo  Confusion  Impaired concentration +   Pain worsened with physical activity +   Neck pain   Cheek pain on R +      Cluster headache symptoms: none      Symptoms of increased intracranial pressure: none      Basilar migraine symptoms: none      Headache Triggers:   Stress +   Bright or flickering light  Strong smell  Vigorous exercise  Dietary factors   Visual strain   Weather changes +   Exertion  Heat (hot weather, hot baths or showers)  Menses     Other comorbid conditions:  Anxiety - yes   Motion sickness symptom - no      Treatment history:  Resolution of headache with sleep - yes   Meds tried:  H/o Renal stones   Fioricet, relpax, xanax   Flexeril   Medrol dose   maxalt - sluggish   trudhesa -feeling weird   Antiviral - not help   accupressure - helped   accupunture - helped   PT - helps   estradiol 0.05 mg/24 hr td ptsw (VIVELLE-DOT) 0.05 mg/24 hr  - twice weekly patch - helps with hot flashes, now on daily pill   Topamax 25 mg - not help   Gabapentin 100 mg  - drowsy, not help    mobic - helped   BOTOX#1 1/31/22 - dramatic improvement in HA   BOTOX#2 8/4/22 - dramatic improvement in HA    BOTOX#3 8/4/22 - dramatic improvement in HA    BOTOX#4 11/3/22 - dramatic improvement in HA    BOTOX#5 2/2/23 - dramatic improvement in HA    BOTOX#6 5/4/23 - dramatic improvement in HA    BOTOX#7 7/27/23 - dramatic improvement in HA, very mild  ptosis   BOTOX#8 10/6/23 - dramatic improvement in HA, very mild ptosis   BOTOX#9 1/18/24 - dramatic improvement in HA, very mild ptosis   BOTOX#10 3/21/24 - dramatic improvement in HA, very mild ptosis     Headache risk factors:   H/o TBI  - no   H/o Meningitis  - no   F/h Aneurysms  - no     Headache burden:   In the last three months:  Days missed from work = several   Days unable to perform activities of daily living = 0  Days unable to attend social activities = several       Review of Systems   Constitutional: Negative.    HENT: Negative.    Eyes: Negative.    Respiratory: Negative.    Cardiovascular: Negative.    Gastrointestinal: Negative.    Endocrine: Negative.    Genitourinary: Negative.    Musculoskeletal: neck pain.    Skin: Negative.    Allergic/Immunologic: Negative.    Neurological: Negative.    Hematological: Negative.    Psychiatric/Behavioral: anxiety.        Objective:   Physical Exam   Constitutional: She is oriented to person, place, and time. She appears well-developed and well-nourished.   Psychiatric: She has a normal mood and affect. Her behavior is normal. Judgment and thought content normal.       Assessment:       1. Intractable chronic migraine without aura   2. Myofacial pain (pressure points)- masseter, neck   3. Tonsillar hernia into foramen magnum      Her HA are unlikely from a TAC and more likely from her tonsillar herniation and posture      Neck and upper chest - vibrating sensation      MRI C spine  Impression       No focal disk abnormalities or significant central canal or neural foraminal stenosis.  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    2-3 punctate foci of T2/flair signal hyperintensity frontal subcortical white matter which are nonspecific and of uncertain clinical significance.    Otherwise unremarkable  MRI brain without evidence for acute infarction or enhancing lesion.    Electronically signed by: TRISH GOFF DO  Date: 10/24/14  Time: 14:27       Degenerative findings:   C2-C3: No spinal canal stenosis or neural foraminal narrowing.   C3-C4: Posterior disc osteophyte complex and left facet joint arthropathy resulting in mild left-sided neural foraminal narrowing.  No spinal canal stenosis.   C4-C5: Posterior disc osteophyte complex with no significant spinal canal stenosis or neural foraminal narrowing.   C5-C6: Posterior disc osteophyte complex with mild spinal canal stenosis.  No neural foraminal narrowing.   C6-C7: Posterior disc osteophyte complex with no significant spinal canal stenosis or neural foraminal narrowing.   C7-T1: No spinal canal stenosis or neural foraminal narrowing.  Redemonstration perineural cysts within the proximal extraforaminal space bilaterally at this level which possibly compressing on the exiting nerve roots.   Paraspinal muscles & soft tissues: Unremarkable.     Impression:   1. Redemonstration of perineural cysts bilaterally at the level of C7-T1, as above.  2. No significant spinal canal stenosis or neural foraminal narrowing throughout the cervical spine, as above.     Electronically signed by resident: Macho Boone  Date:                                            08/20/2020  Time:                                           09:29     Electronically signed by: Mauricio Pitt MD  Date:                                            08/20/2020  Time:                                           09:55      Results for LAISHA HUMPHREY (MRN [id number]) as of 10/11/2019 09:44    Ref. Range 2/5/2019 10:19   Vitamin B-12 Latest Ref Range: 210 - 950 pg/mL >2000 (H)   Thiamine Latest Ref Range: 38 - 122 ug/L 45   Vit D, 25-Hydroxy Latest Ref Range: 30 - 96 ng/mL 75   Vitamin B2 Latest Ref Range: 1 - 19 mcg/L 18   Vitamin B6 Latest Ref Range: 5 - 50 ug/L 131 (H)   Antigliadin Ab IgG  Latest Ref Range: <20 UNITS 2   Antigliadin Abs, IgA Latest Ref Range: <20 UNITS 7   TTG IgA Latest Ref Range: <20 UNITS 9   TTG IgG Latest Ref Range: <20 UNITS 3   Immunoglobulin A (IgA) Latest Ref Range: 70 - 400 mg/dL 172      Plan:   cont estrogen, Singulair, amlodipine   Breakthrough headache - imitrex 100 mg, xanax, mobic, ubrelvy, pain cream   Dentist - Floyd Erickson ([place])     BOTOX treatment response:   Prior to initiating BOTOX, the patient had  24  migraine days per month on average. This meets criteria for chronic migraine.   After starting BOTOX, the patient experienced a reduction in  21  days per month   After starting BOTOX, the patient experienced a reduction in > 100 hours of migraine symptoms per month   After starting BOTOX, the patient experienced a 50% reduction in burden of migraine days per month   Based on this information, BOTOX is medically necessary for the management of the patient's chronic migraine.     BOTOX Injection intervals:   Patient reports a 'wearing off effect' prior to the subsequent BOTOX injections at 12 weeks. This occurs at week 10  Symptoms of this a 'wearing off effect' include - worsening of migraine headache frequency and intensity   Medications used during the 'wearing off effect' period include ubrelvy  Based on this information, it is medically necessary for the patient to receive BOTOX therapy at an interval of every 10 weeks for the management of chronic migraine.     BOTOX was performed as an indicated therapy for intractable chronic migraine headaches given that the patient failed > 2 prophylactic medications     Botulinum Toxin Injection Procedure   Pre-operative diagnosis: Chronic migraine   Post-operative diagnosis: Same   Procedure: Chemical neurolysis   After risks and benefits were explained including bleeding, infection, worsening of pain, damage to the areas being injected, weakness of muscles, loss of muscle control, dysphagia if injecting the  head or neck, facial droop if injecting the facial area, painful injection, allergic or other reaction to the medications being injected, and the failure of the procedure to help the problem, a signed consent was obtained.   The patient was placed in a comfortable area and the sites to be treated were identified.The area to be treated was prepped three times with alcohol and the alcohol allowed to dry. Next, a 30 gauge needle was used to inject the medication in the area to be treated.   Area(s) injected:   Total Botox used: 175 Units   Botox wastage: 25 Units   Injection sites:    muscle bilaterally ( a total of 5 units divided into 2 sites)   Procerus muscle (5 units)   Frontalis muscle bilaterally (a total of 20 units divided into 4 sites)   Temporalis muscle bilaterally (a total of 40 units divided into 8 sites)   Occipitalis muscle bilaterally (a total of 30 units divided into 6 sites)   Cervical paraspinal muscles (a total of 20 units divided into 4 sites)   Trapezius muscle bilaterally (a total of 30 units divided into 6 sites)  Masseter 5 units bel    Mastoid 5 units   Complications: none   RTC for the next Botox injection: 10 weeks          Patient verbalized understanding to plan. I answered all her questions to her satisfaction    Procedures

## 2024-06-03 ENCOUNTER — PATIENT MESSAGE (OUTPATIENT)
Dept: PRIMARY CARE CLINIC | Facility: CLINIC | Age: 54
End: 2024-06-03
Payer: COMMERCIAL

## 2024-06-03 DIAGNOSIS — I10 ESSENTIAL HYPERTENSION: ICD-10-CM

## 2024-06-04 RX ORDER — AMLODIPINE BESYLATE 5 MG/1
5 TABLET ORAL DAILY
Qty: 90 TABLET | Refills: 0 | Status: SHIPPED | OUTPATIENT
Start: 2024-06-04

## 2024-06-04 NOTE — TELEPHONE ENCOUNTER
No care due was identified.  [facility] Embedded Care Due Messages. Reference number: [id number].   6/04/2024 8:47:54 AM CDT

## 2024-07-09 ENCOUNTER — HOSPITAL ENCOUNTER (OUTPATIENT)
Dept: RADIOLOGY | Facility: HOSPITAL | Age: 54
Discharge: HOME | End: 2024-07-09
Attending: STUDENT IN AN ORGANIZED HEALTH CARE EDUCATION/TRAINING PROGRAM
Payer: COMMERCIAL

## 2024-07-09 ENCOUNTER — PATIENT MESSAGE (OUTPATIENT)
Dept: PRIMARY CARE CLINIC | Facility: CLINIC | Age: 54
End: 2024-07-09
Payer: COMMERCIAL

## 2024-07-09 DIAGNOSIS — N94.89 ADNEXAL MASS: ICD-10-CM

## 2024-07-09 PROCEDURE — 76830 TRANSVAGINAL US NON-OB: CPT | Mod: 26,,, | Performed by: STUDENT IN AN ORGANIZED HEALTH CARE EDUCATION/TRAINING PROGRAM

## 2024-07-09 PROCEDURE — 76830 TRANSVAGINAL US NON-OB: CPT | Mod: TC

## 2024-07-09 PROCEDURE — 76856 US EXAM PELVIC COMPLETE: CPT | Mod: 26,,, | Performed by: STUDENT IN AN ORGANIZED HEALTH CARE EDUCATION/TRAINING PROGRAM

## 2024-07-09 NOTE — PROGRESS NOTES
Hi, just FYI. Repeat pelvic US still with cystic mass appearing to be an ovarian cyst per their report. Do you want me to help arrange follow-up with you in clinic for this? Thanks!    Iva Trujillo

## 2024-07-11 ENCOUNTER — PATIENT MESSAGE (OUTPATIENT)
Dept: NEUROLOGY | Facility: CLINIC | Age: 54
End: 2024-07-11
Payer: COMMERCIAL

## 2024-07-15 ENCOUNTER — TELEPHONE (OUTPATIENT)
Dept: OBSTETRICS AND GYNECOLOGY | Facility: CLINIC | Age: 54
End: 2024-07-15
Payer: COMMERCIAL

## 2024-07-15 NOTE — TELEPHONE ENCOUNTER
----- Message from Timmy Nova MD sent at 7/14/2024 10:31 PM CDT -----  Please schedule f/u u/s and appt for f/u with Gracia in a month  ----- Message -----  From: Iva Trujillo MD  Sent: 7/9/2024   5:45 PM CDT  To: Linda Durbin MD    Hi, just FYI. Repeat pelvic US still with cystic mass appearing to be an ovarian cyst per their report. Do you want me to help arrange follow-up with you in clinic for this? Thanks!    Iva Trujillo

## 2024-07-22 ENCOUNTER — E-VISIT (OUTPATIENT)
Dept: PRIMARY CARE CLINIC | Facility: CLINIC | Age: 54
End: 2024-07-22
Payer: COMMERCIAL

## 2024-07-22 ENCOUNTER — PATIENT MESSAGE (OUTPATIENT)
Dept: PRIMARY CARE CLINIC | Facility: CLINIC | Age: 54
End: 2024-07-22
Payer: COMMERCIAL

## 2024-07-22 DIAGNOSIS — M72.2 PLANTAR FASCIITIS: Primary | ICD-10-CM

## 2024-07-23 NOTE — PROGRESS NOTES
Patient ID: Cheryle Olivares is a 54 y.o. female.    Chief Complaint: No chief complaint on file.    The patient initiated a request through [employer] on 7/22/2024 for evaluation and management with a chief complaint of No chief complaint on file.     I evaluated the questionnaire submission on 7/23/24.    [facility]    7/22/2024  9:38 PM CDT - Filed by Patient   Do you agree to participate in an E-Visit? Yes   If you have any of the following symptoms, please present to your local emergency room or call 911:  I acknowledge   Are you pregnant, could you be pregnant, or are you breast feeding? None of the above   What is the main issue you would like addressed today? Foot pain likely plantar fasciitis   Please describe your symptoms Left foot pain on bottom from arch through heel. Worse first thing in the morning and after walking for a while   Where is your problem located? Bottom of foot from arch through heel   How severe are your symptoms? Moderate   Have you had these symptoms before? Yes   How long have you been having these symptoms? For more than a month   Please list any medications or treatments you have used for your condition and indicate if it was effective or not. Celebrex not effective, massage & accupressure ball, ice pack effective to alleviate pain;  stretches given by physical therapist a few years ago;  shoe insert   What makes this feel better? All but medication temporarily alleviate pain   What makes this feel worse? Walking & standing   Are these symptoms related to a condition that you currently have? No   Please describe any probable cause for these symptoms [employer] walking were when the pain started   Provide any additional information you feel is important. I have had plantar fasciitis before - mild cases when i ran regularly & a moderate case about 5 years ago (right foot)   Please attach any relevant images or files    Are you able to take your  vital signs? Yes   Systolic Blood Pressure: 113   Diastolic Blood Pressure: 68   Weight: 180   Height: 64   Pulse: 68   Temperature: 97.3   Respiration rate:    Pulse Oxygen: 95         No diagnosis found.     No orders of the defined types were placed in this encounter.           No follow-ups on file.      E-Visit Time Tracking:    Day 1 Time (in minutes): 5    Total Time (in minutes): 5

## 2024-07-29 NOTE — TELEPHONE ENCOUNTER
Pt state she requested a referral for physical therapy on 7/23 and it has not been received by [facility]. I see you placed a referral to PODIATRY on 7/23. She scheduled a appt with [facility] and they are waiting on the referral. Did you want to refer her to PT pr podiatry?

## 2024-07-31 ENCOUNTER — TELEPHONE (OUTPATIENT)
Dept: PRIMARY CARE CLINIC | Facility: CLINIC | Age: 54
End: 2024-07-31
Payer: COMMERCIAL

## 2024-08-11 DIAGNOSIS — K22.4 ESOPHAGEAL SPASM: ICD-10-CM

## 2024-08-11 NOTE — TELEPHONE ENCOUNTER
No care due was identified.  [facility] Embedded Care Due Messages. Reference number: [id number].   8/11/2024 3:45:25 AM CDT

## 2024-08-12 RX ORDER — PANTOPRAZOLE SODIUM 40 MG/1
40 TABLET, DELAYED RELEASE ORAL
Qty: 90 TABLET | Refills: 0 | Status: SHIPPED | OUTPATIENT
Start: 2024-08-12

## 2024-08-12 NOTE — TELEPHONE ENCOUNTER
Refill Routing Note   Medication(s) are not appropriate for processing by [facility] for the following reason(s):        New or recently adjusted medication    [facility] action(s):  Defer               Appointments  past 12m or future 3m with PCP    Date Provider   Last Visit   7/22/2024 Iva Trujillo MD   Next Visit   Visit date not found Iva Trujillo MD   ED visits in past 90 days: 0        Note composed:10:43 PM 08/11/2024

## 2024-08-22 ENCOUNTER — PROCEDURE VISIT (OUTPATIENT)
Dept: NEUROLOGY | Facility: CLINIC | Age: 54
End: 2024-08-22
Payer: COMMERCIAL

## 2024-08-22 VITALS
WEIGHT: 178.13 LBS | SYSTOLIC BLOOD PRESSURE: 139 MMHG | HEART RATE: 85 BPM | HEIGHT: 64 IN | BODY MASS INDEX: 30.41 KG/M2 | DIASTOLIC BLOOD PRESSURE: 85 MMHG

## 2024-08-22 DIAGNOSIS — G43.709 CHRONIC MIGRAINE W/O AURA W/O STATUS MIGRAINOSUS, NOT INTRACTABLE: Primary | ICD-10-CM

## 2024-08-22 NOTE — PROCEDURES
Follow up:   Overall doing well     Prior note:    In last 4 weeks has had more freq migraine   Use of ubrelvy 10 per month     Prior note:   Trigger for migraine - stress   Relieving factor  - exercise      Prior note:   Now has severe migraine 2/month   ubrelvy - partial relief     Prior note:   Overall doing much better   Feels BOTOX wore off 1 week ago     Prior note:   Overall unchanged   Still has nearly daily HAs  Ubrelvy  is effective      Prior note:   S/p PT, dry needling (SCM, occipitalis, trap) - very effective (but lasts only few days)   Clenched teeth - saw dentist, no solutions   HA are now 3/week. Also has days of CDH. Imitrex helps       Prior note:   Base of skull pain at early AM hours - radiates to front + dizziness      PT with dry needling helps when she has clusters of headaches   Stress is a trigger   Still doing yoga, meditation   Using mouth guard      Also reports throat 'spasms'. No benefit from cingulair      Taking Imitrex nearly daily      3/4/20 e-mail: Hello! First, I want to report that I'm still making amazing progress in reducing and almost eliminating my migraines! Physical therapy and stress reduction have been keys. I was recently able to add everyday exercise back into my routine, with few headaches afterward. This is miraculous! I have gone through all of my prescribed PT sessions, and I don't feel I need to continue on a regular basis. However, in order to continue to be physically active, both with at-home PT exercises and regular fitness (running, walking, yoga), it would be helpful to have PT every 2-3 weeks for awhile, as my muscles continue to adjust to better posture and more effective functioning. Is it possible to prescribe additional PT at less frequency? Thank you so much!     Prior note:   Stress -> Neck pain -> HA + nausea   overactivation of trap      Prior note:   Oct 2014 - diag with Hemicran cont (L) - pain points on the L side + photo/phono  Indomethacin  25mg PRN - helped   Currently 4/week Hz      Headache history:   Age of onset -  Childhood   Location - R or L   Nature of pain -  Throbbing   Prodrome - no   Aura -  No   Duration of headache - > 4 hrs   Time to peak intensity - hrs   Pain scale - range of intensity -  8/10  Frequency -  2-3/month   Status Migrainosus history - yes   Time of day of most headaches- anytime      Associated symptoms with the headache:   Meningeal symptoms - photophobia, phonophobia, exercise intolerance +   Nausea/vomitting+   Nasal drainage   Visual blurriness   Pallor/flushing  Dizziness +   Vertigo  Confusion  Impaired concentration +   Pain worsened with physical activity +   Neck pain   Cheek pain on R +      Cluster headache symptoms: none      Symptoms of increased intracranial pressure: none      Basilar migraine symptoms: none      Headache Triggers:   Stress +   Bright or flickering light  Strong smell  Vigorous exercise  Dietary factors   Visual strain   Weather changes +   Exertion  Heat (hot weather, hot baths or showers)  Menses     Other comorbid conditions:  Anxiety - yes   Motion sickness symptom - no      Treatment history:  Resolution of headache with sleep - yes   Meds tried:  H/o Renal stones   Fioricet, relpax, xanax   Flexeril   Medrol dose   maxalt - sluggish   trudhesa -feeling weird   Antiviral - not help   accupressure - helped   accupunture - helped   PT - helps   estradiol 0.05 mg/24 hr td ptsw (VIVELLE-DOT) 0.05 mg/24 hr  - twice weekly patch - helps with hot flashes, now on daily pill   Topamax 25 mg - not help   Gabapentin 100 mg  - drowsy, not help    mobic - helped   BOTOX#1 1/31/22 - dramatic improvement in HA   BOTOX#2 8/4/22 - dramatic improvement in HA    BOTOX#3 8/4/22 - dramatic improvement in HA    BOTOX#4 11/3/22 - dramatic improvement in HA    BOTOX#5 2/2/23 - dramatic improvement in HA    BOTOX#6 5/4/23 - dramatic improvement in HA    BOTOX#7 7/27/23 - dramatic improvement in HA, very mild  ptosis   BOTOX#8 10/6/23 - dramatic improvement in HA, very mild ptosis   BOTOX#9 1/18/24 - dramatic improvement in HA, very mild ptosis   BOTOX#10 3/21/24 - dramatic improvement in HA, very mild ptosis   BOTOX#11 5/27/24 - dramatic improvement in HA    Headache risk factors:   H/o TBI  - no   H/o Meningitis  - no   F/h Aneurysms  - no     Headache burden:   In the last three months:  Days missed from work = several   Days unable to perform activities of daily living = 0  Days unable to attend social activities = several       Review of Systems   Constitutional: Negative.    HENT: Negative.    Eyes: Negative.    Respiratory: Negative.    Cardiovascular: Negative.    Gastrointestinal: Negative.    Endocrine: Negative.    Genitourinary: Negative.    Musculoskeletal: neck pain.    Skin: Negative.    Allergic/Immunologic: Negative.    Neurological: Negative.    Hematological: Negative.    Psychiatric/Behavioral: anxiety.        Objective:   Physical Exam   Constitutional: She is oriented to person, place, and time. She appears well-developed and well-nourished.   Psychiatric: She has a normal mood and affect. Her behavior is normal. Judgment and thought content normal.       Assessment:       1. Intractable chronic migraine without aura   2. Myofacial pain (pressure points)- masseter, neck   3. Tonsillar hernia into foramen magnum      Her HA are unlikely from a TAC and more likely from her tonsillar herniation and posture      Neck and upper chest - vibrating sensation      MRI C spine  Impression       No focal disk abnormalities or significant central canal or neural foraminal stenosis.  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    2-3 punctate foci of T2/flair signal hyperintensity frontal subcortical white matter which are nonspecific and of uncertain  clinical significance.    Otherwise unremarkable MRI brain without evidence for acute infarction or enhancing lesion.    Electronically signed by: TRISH GOFF DO  Date: 10/24/14  Time: 14:27       Degenerative findings:   C2-C3: No spinal canal stenosis or neural foraminal narrowing.   C3-C4: Posterior disc osteophyte complex and left facet joint arthropathy resulting in mild left-sided neural foraminal narrowing.  No spinal canal stenosis.   C4-C5: Posterior disc osteophyte complex with no significant spinal canal stenosis or neural foraminal narrowing.   C5-C6: Posterior disc osteophyte complex with mild spinal canal stenosis.  No neural foraminal narrowing.   C6-C7: Posterior disc osteophyte complex with no significant spinal canal stenosis or neural foraminal narrowing.   C7-T1: No spinal canal stenosis or neural foraminal narrowing.  Redemonstration perineural cysts within the proximal extraforaminal space bilaterally at this level which possibly compressing on the exiting nerve roots.   Paraspinal muscles & soft tissues: Unremarkable.     Impression:   1. Redemonstration of perineural cysts bilaterally at the level of C7-T1, as above.  2. No significant spinal canal stenosis or neural foraminal narrowing throughout the cervical spine, as above.     Electronically signed by resident: Macho Boone  Date:                                            08/20/2020  Time:                                           09:29     Electronically signed by: Mauricio Pitt MD  Date:                                            08/20/2020  Time:                                           09:55      Results for LAISHA HUMPHREY (MRN [id number]) as of 10/11/2019 09:44    Ref. Range 2/5/2019 10:19   Vitamin B-12 Latest Ref Range: 210 - 950 pg/mL >2000 (H)   Thiamine Latest Ref Range: 38 - 122 ug/L 45   Vit D, 25-Hydroxy Latest Ref Range: 30 - 96 ng/mL 75   Vitamin B2 Latest Ref Range: 1 - 19 mcg/L 18   Vitamin B6 Latest Ref  Range: 5 - 50 ug/L 131 (H)   Antigliadin Ab IgG Latest Ref Range: <20 UNITS 2   Antigliadin Abs, IgA Latest Ref Range: <20 UNITS 7   TTG IgA Latest Ref Range: <20 UNITS 9   TTG IgG Latest Ref Range: <20 UNITS 3   Immunoglobulin A (IgA) Latest Ref Range: 70 - 400 mg/dL 172      Plan:   cont estrogen, Singulair, amlodipine   Breakthrough headache - imitrex 100 mg, xanax, mobic, ubrelvy, pain cream   Dentist - Floyd Erickson ([facility])     BOTOX treatment response:   Prior to initiating BOTOX, the patient had  24  migraine days per month on average. This meets criteria for chronic migraine.   After starting BOTOX, the patient experienced a reduction in  21  days per month   After starting BOTOX, the patient experienced a reduction in > 100 hours of migraine symptoms per month   After starting BOTOX, the patient experienced a 50% reduction in burden of migraine days per month   Based on this information, BOTOX is medically necessary for the management of the patient's chronic migraine.     BOTOX Injection intervals:   Patient reports a 'wearing off effect' prior to the subsequent BOTOX injections at 12 weeks. This occurs at week 10  Symptoms of this a 'wearing off effect' include - worsening of migraine headache frequency and intensity   Medications used during the 'wearing off effect' period include ubrelvy  Based on this information, it is medically necessary for the patient to receive BOTOX therapy at an interval of every 10 weeks for the management of chronic migraine.     BOTOX was performed as an indicated therapy for intractable chronic migraine headaches given that the patient failed > 2 prophylactic medications. Failed - Topamax, Elavil, Amlodipine, Gabapentin, Propranolol      Botulinum Toxin Injection Procedure   Pre-operative diagnosis: Chronic migraine   Post-operative diagnosis: Same   Procedure: Chemical neurolysis   After risks and benefits were explained including bleeding, infection, worsening of pain,  damage to the areas being injected, weakness of muscles, loss of muscle control, dysphagia if injecting the head or neck, facial droop if injecting the facial area, painful injection, allergic or other reaction to the medications being injected, and the failure of the procedure to help the problem, a signed consent was obtained.   The patient was placed in a comfortable area and the sites to be treated were identified.The area to be treated was prepped three times with alcohol and the alcohol allowed to dry. Next, a 30 gauge needle was used to inject the medication in the area to be treated.   Area(s) injected:   Total Botox used: 175 Units   Botox wastage: 25 Units   Injection sites:    muscle bilaterally ( a total of 5 units divided into 2 sites)   Procerus muscle (5 units)   Frontalis muscle bilaterally (a total of 20 units divided into 4 sites)   Temporalis muscle bilaterally (a total of 40 units divided into 8 sites)   Occipitalis muscle bilaterally (a total of 30 units divided into 6 sites)   Cervical paraspinal muscles (a total of 20 units divided into 4 sites)   Trapezius muscle bilaterally (a total of 30 units divided into 6 sites)  Masseter 5 units bel    Mastoid 5 units   Complications: none   RTC for the next Botox injection: 10 weeks          Patient verbalized understanding to plan. I answered all her questions to her satisfaction    Procedures

## 2024-09-25 ENCOUNTER — OFFICE VISIT (OUTPATIENT)
Dept: OPTOMETRY | Facility: CLINIC | Age: 54
End: 2024-09-25
Payer: COMMERCIAL

## 2024-09-25 DIAGNOSIS — H52.13 MYOPIA OF BOTH EYES: ICD-10-CM

## 2024-09-25 DIAGNOSIS — Z01.00 EYE EXAM, ROUTINE: Primary | ICD-10-CM

## 2024-09-25 PROCEDURE — 92015 DETERMINE REFRACTIVE STATE: CPT | Mod: S$GLB,,, | Performed by: OPTOMETRIST

## 2024-09-25 PROCEDURE — 99999 PR PBB SHADOW E&M-EST. PATIENT-LVL III: CPT | Mod: PBBFAC,,, | Performed by: OPTOMETRIST

## 2024-09-25 PROCEDURE — 3044F HG A1C LEVEL LT 7.0%: CPT | Mod: CPTII,S$GLB,, | Performed by: OPTOMETRIST

## 2024-09-25 PROCEDURE — 92014 COMPRE OPH EXAM EST PT 1/>: CPT | Mod: S$GLB,,, | Performed by: OPTOMETRIST

## 2024-09-25 PROCEDURE — 1159F MED LIST DOCD IN RCRD: CPT | Mod: CPTII,S$GLB,, | Performed by: OPTOMETRIST

## 2024-09-25 NOTE — PROGRESS NOTES
HPI    Annual Exam   Pt states vision has changed   Poor vision @ Near   OTC +1.75/ +2.00    Pt reports higher frequency of flashes   Pt reports blank spots in vision   Pt reports floaters- longstanding   Higher frequency of ocular migraines      Pt reports Dry/ Itchy/ Burning  Gtt: Yes Systane PRN/ Optase @ night   Pt reports relief/ Pt mild relief     Last edited by Daniel Saavedra, OD on 9/25/2024  3:02 PM.            Assessment /Plan     For exam results, see Encounter Report.    Eye exam, routine  -Spectera    Myopia of both eyes  Eyeglass Final Rx       Eyeglass Final Rx         Sphere Cylinder Dist VA Add    Right -0.25 Sphere 20/20 +2.00    Left -0.25 Sphere 20/20 +2.00      Type: PAL    Expiration Date: 9/25/2025                      RTC 1 yr

## 2024-10-29 ENCOUNTER — PATIENT MESSAGE (OUTPATIENT)
Dept: OPTOMETRY | Facility: CLINIC | Age: 54
End: 2024-10-29
Payer: COMMERCIAL

## 2024-10-30 ENCOUNTER — OFFICE VISIT (OUTPATIENT)
Dept: OPTOMETRY | Facility: CLINIC | Age: 54
End: 2024-10-30
Payer: COMMERCIAL

## 2024-10-30 DIAGNOSIS — H00.014 HORDEOLUM EXTERNUM OF LEFT UPPER EYELID: ICD-10-CM

## 2024-10-30 DIAGNOSIS — H00.036 EYELID CELLULITIS, LEFT: Primary | ICD-10-CM

## 2024-10-30 PROCEDURE — 99999 PR PBB SHADOW E&M-EST. PATIENT-LVL III: CPT | Mod: PBBFAC,,, | Performed by: OPTOMETRIST

## 2024-10-30 RX ORDER — AMOXICILLIN AND CLAVULANATE POTASSIUM 875; 125 MG/1; MG/1
1 TABLET, FILM COATED ORAL 2 TIMES DAILY
Qty: 20 TABLET | Refills: 0 | Status: SHIPPED | OUTPATIENT
Start: 2024-10-30 | End: 2024-11-09

## 2024-11-09 ENCOUNTER — PATIENT MESSAGE (OUTPATIENT)
Dept: PRIMARY CARE CLINIC | Facility: CLINIC | Age: 54
End: 2024-11-09
Payer: COMMERCIAL

## 2024-11-09 DIAGNOSIS — N83.201 RIGHT OVARIAN CYST: Primary | ICD-10-CM

## 2024-11-13 DIAGNOSIS — K22.4 ESOPHAGEAL SPASM: ICD-10-CM

## 2024-11-13 RX ORDER — PANTOPRAZOLE SODIUM 40 MG/1
40 TABLET, DELAYED RELEASE ORAL
Qty: 90 TABLET | Refills: 1 | Status: SHIPPED | OUTPATIENT
Start: 2024-11-13

## 2024-11-13 NOTE — TELEPHONE ENCOUNTER
Refill Decision Note   Cheryle Marshall  is requesting a refill authorization.  Brief Assessment and Rationale for Refill:  Approve     Medication Therapy Plan:         Comments:     Note composed:8:59 AM 11/13/2024

## 2024-11-13 NOTE — TELEPHONE ENCOUNTER
No care due was identified.  Health [facility] Embedded Care Due Messages. Reference number: [id number].   11/13/2024 3:44:56 AM CST

## 2024-11-14 ENCOUNTER — PROCEDURE VISIT (OUTPATIENT)
Dept: NEUROLOGY | Facility: CLINIC | Age: 54
End: 2024-11-14
Payer: COMMERCIAL

## 2024-11-14 VITALS
SYSTOLIC BLOOD PRESSURE: 126 MMHG | BODY MASS INDEX: 30.61 KG/M2 | WEIGHT: 178.38 LBS | DIASTOLIC BLOOD PRESSURE: 87 MMHG | HEART RATE: 83 BPM

## 2024-11-14 DIAGNOSIS — G43.709 CHRONIC MIGRAINE W/O AURA W/O STATUS MIGRAINOSUS, NOT INTRACTABLE: ICD-10-CM

## 2024-11-14 PROCEDURE — 64615 CHEMODENERV MUSC MIGRAINE: CPT | Mod: S$GLB,,, | Performed by: PSYCHIATRY & NEUROLOGY

## 2024-11-14 RX ORDER — UBROGEPANT 100 MG/1
100 TABLET ORAL
Qty: 16 TABLET | Refills: 12 | Status: SHIPPED | OUTPATIENT
Start: 2024-11-14

## 2024-11-18 ENCOUNTER — PATIENT MESSAGE (OUTPATIENT)
Dept: OBSTETRICS AND GYNECOLOGY | Facility: CLINIC | Age: 54
End: 2024-11-18
Payer: COMMERCIAL

## 2024-11-18 DIAGNOSIS — N83.209 CYST OF OVARY, UNSPECIFIED LATERALITY: Primary | ICD-10-CM

## 2024-11-22 ENCOUNTER — HOSPITAL ENCOUNTER (EMERGENCY)
Facility: HOSPITAL | Age: 54
Discharge: HOME | End: 2024-11-22
Attending: EMERGENCY MEDICINE
Payer: COMMERCIAL

## 2024-11-22 VITALS
SYSTOLIC BLOOD PRESSURE: 126 MMHG | HEART RATE: 84 BPM | RESPIRATION RATE: 16 BRPM | TEMPERATURE: 98 F | OXYGEN SATURATION: 99 % | BODY MASS INDEX: 29.88 KG/M2 | HEIGHT: 64 IN | DIASTOLIC BLOOD PRESSURE: 88 MMHG | WEIGHT: 175 LBS

## 2024-11-22 DIAGNOSIS — K08.89 SUBLUXATION OF TOOTH: ICD-10-CM

## 2024-11-22 DIAGNOSIS — S01.511A LIP LACERATION, INITIAL ENCOUNTER: ICD-10-CM

## 2024-11-22 DIAGNOSIS — R07.89 CHEST WALL PAIN: ICD-10-CM
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
## 2024-11-22 PROCEDURE — 12011 RPR F/E/E/N/L/M 2.5 CM/<: CPT

## 2024-11-22 PROCEDURE — 25000003 PHARM REV CODE 250: Performed by: EMERGENCY MEDICINE

## 2024-11-22 PROCEDURE — 63600175 PHARM REV CODE 636 W HCPCS: Performed by: EMERGENCY MEDICINE

## 2024-11-22 PROCEDURE — 99284 EMERGENCY DEPT VISIT MOD MDM: CPT | Mod: 25

## 2024-11-22 RX ORDER — LIDOCAINE HYDROCHLORIDE 10 MG/ML
5 INJECTION, SOLUTION EPIDURAL; INFILTRATION; INTRACAUDAL; PERINEURAL
Status: COMPLETED | OUTPATIENT
Start: 2024-11-22 | End: 2024-11-22

## 2024-11-22 RX ORDER — LIDOCAINE 50 MG/G
1 PATCH TOPICAL
Status: DISCONTINUED | OUTPATIENT
Start: 2024-11-22 | End: 2024-11-22 | Stop reason: HOSPADM

## 2024-11-22 RX ORDER — BACITRACIN ZINC 500 [USP'U]/G
1 OINTMENT TOPICAL
Status: COMPLETED | OUTPATIENT
Start: 2024-11-22 | End: 2024-11-22

## 2024-11-22 RX ADMIN — LIDOCAINE HYDROCHLORIDE 50 MG: 10 SOLUTION INTRAVENOUS at 04:11

## 2024-11-22 RX ADMIN — BACITRACIN 1 EACH: 500 OINTMENT TOPICAL at 04:11

## 2024-11-22 NOTE — PROGRESS NOTES
Progress Note    Received patient at signout.    53 y/o F here after a fall. Pending XR/CT results.     -XR ribs/knee negative for fracture. CT negative for fracture.   -Tdap UTD, lac already repaired  -Patient reassessed, she is feeling well, no complaints.  -OTC meds for home, f/u with PCP, if pain continues will need repeat Xrs, she expressed understanding, DC home.

## 2024-11-22 NOTE — ED NOTES
Patient states she fell at 1300 today , states she tripped, reports she hit her mouth, nose, left knee and left chest. Denies LOC, Ibuprofen at 1330

## 2024-11-22 NOTE — DISCHARGE INSTRUCTIONS
You may take ibuprofen 800 mg every 8 hours as needed for pain.  You may also use warm compresses.  Lidocaine patches will help.  Be sure to ice your lip to decrease swelling.  Be sure to follow up with a dentist within the next week for evaluation of your tooth.    Apply bacitracin or another topical antibiotic ointment to your laceration twice a day for the next few days.    Return to the ER for any severe headache, intractable vomiting, weakness, numbness, red streaking up your face, fever, or other concerning symptoms.    1). Watch for redness, drainage, warmth, tenderness, or fever and return to ED or see primary care physician if any of those sign/symptoms develop. If you have any concerns, please follow-up with your primary physician or return to the ED.    2). Sutures are absorbable and will fall out on their own.  If they are irritating you, they may be removed in 10 days.  We recommend a physician or a qualified healthcare worker to remove all sutures, please followup with your primary care physician for suture removal.    3). Keep wound clean and dry for the next 24 hours, then may shower and/or clean gently with water. Apply antibiotic ointment twice daily for the next week.     4). Please avoid any submersion of wound in water (to include baths, pools, hot tubs, lakes) until sutures removed.    5). Please apply sunscreen to the area after sutures removed for the next year as sunscreen my aid in reducing the appearance of scaring.

## 2024-11-22 NOTE — ED NOTES
Patient identifiers verified and correct for Ms Olivares  C/C:  Fall with laceration to top lip SEE NN  APPEARANCE: awake and alert in NAD. PAIN  0/10  SKIN: warm, dry laceration to lip, 1 cm, non bleeding, SEE PHOTOS   MUSCULOSKELETAL: Patient moving all extremities spontaneously, no obvious swelling or deformities noted. Ambulates independently.  RESPIRATORY: Denies shortness of breath.Respirations unlabored.   CARDIAC: Denies CP, 2+ distal pulses; no peripheral edema  ABDOMEN: S/ND/NT, Denies nausea  : voids spontaneously, denies difficulty  Neurologic: AAO x 4; follows commands equal strength in all extremities; denies numbness/tingling. Denies dizziness Deneis new weakness

## 2024-11-22 NOTE — FIRST PROVIDER EVALUATION
54-year-old female presents s/p mechanical trip and fall with right knee pain and facial trauma.  No presyncopal symptoms, no LOC. patient has small left upper lip laceration, loose tooth, and left chest wall and right knee tenderness.  X-rays of right knee and chest ordered, no facial bone tenderness to suggest fracture.  Rest of workup deferred to treating provider

## 2024-11-22 NOTE — ED PROVIDER NOTES
Encounter Date: 11/22/2024       History     Chief Complaint   Patient presents with    Fall     Pt tripped over someone's foot and fell on face and L knee. -LOC. Minor laceration to upper lip.      54-year-old woman with past medical history of migraines, anemia, obesity, anxiety, hyperlipidemia presents with a chief complaint of mechanical trip and fall.  Fall occurred at 1:00 p.m. while tripping over a foot at work.  She fell forward onto her left face, catching herself with the left hand.  She notes pain at her left lip where she sustained a laceration.  Also some pain to nose.  Pain to left knee.  Left chest wall but denies hitting it.  No LOC.  No anticoagulants.  She took 800 mg of ibuprofen and came to the ER.  Tetanus is up-to-date.  No shortness of breath.        Review of patient's allergies indicates:   Allergen Reactions    Maxalt [rizatriptan]     Ciprofloxacin Nausea Only     Past Medical History:   Diagnosis Date    Allergy     Anemia     History of migraine headaches     Migraine headache     Morbid obesity     Preeclampsia, severe 7/15/2013     Past Surgical History:   Procedure Laterality Date    COLONOSCOPY N/A 12/29/2021    Procedure: COLONOSCOPY;  Surgeon: Mahendra Archer MD;  Location: [facility] ([address] [place] [address]);  Service: Endoscopy;  Laterality: N/A;  Pt. is Fully Vaccinated.EC    HYSTERECTOMY      TVH WITHOUT OOPHORECTOMY BLEEDING 12/2015 EK    left knee surgery       Family History   Problem Relation Name Age of Onset    Skin cancer Father      Melanoma Father      Skin cancer Brother      Melanoma Brother      Colon cancer Maternal Uncle      Miscarriages / Stillbirths Sister      Retinal detachment Mother Melissa Nick     Macular degeneration Mother Melissa Romero     Hypertension Mother Melissa Nick     Aortic stenosis Mother Melissa Nick     Hypertension Maternal Grandmother Dolores Joe     Aortic stenosis Maternal Grandmother Dolores Joe     Macular degeneration Maternal  Grandfather Jayant Diaz     Cataracts Maternal Grandfather Jayant Diaz     Heart attack Maternal Grandfather Jayant Diaz     Heart disease Maternal Grandfather Jayant Diaz     Coronary artery disease Paternal Grandfather      No Known Problems Maternal Aunt      No Known Problems Paternal Aunt      No Known Problems Paternal Uncle      No Known Problems Paternal Grandmother      Amblyopia Neg Hx      Blindness Neg Hx      Cancer Neg Hx      Diabetes Neg Hx      Glaucoma Neg Hx      Strabismus Neg Hx      Stroke Neg Hx      Thyroid disease Neg Hx      Breast cancer Neg Hx      Ovarian cancer Neg Hx       Social History     Tobacco Use    Smoking status: Never    Smokeless tobacco: Never   Substance Use Topics    Alcohol use: No    Drug use: No     Review of Systems    Physical Exam     Initial Vitals [11/22/24 1346]   BP Pulse Resp Temp SpO2   (!) 134/94 92 16 97.9 °F (36.6 °C) 99 %      MAP       --         Physical Exam    Nursing note and vitals reviewed.  Constitutional: She appears well-developed and well-nourished. She is not diaphoretic. No distress.   HENT:   Head: Normocephalic.   1 cm C-shaped laceration through left upper lip violating vermilion border  Superficial laceration to inner lip.  Does not appear to track through and through  Left frontal incisor subluxed 5-10 degrees  No nasal tenderness palpation  No nasal septal hematoma   Eyes: EOM are normal. Right eye exhibits no discharge. Left eye exhibits no discharge. No scleral icterus.   Neck: Neck supple. No JVD present.   Normal range of motion.  Cardiovascular:  Normal rate, regular rhythm and normal heart sounds.     Exam reveals no gallop and no friction rub.       No murmur heard.  Pulmonary/Chest: Breath sounds normal. No respiratory distress. She has no wheezes. She has no rhonchi. She has no rales. She exhibits tenderness (Mild, left lateral ribs).   Abdominal: Abdomen is soft. She exhibits no distension and no mass. There  is no abdominal tenderness. There is no rebound and no guarding.   Musculoskeletal:         General: No edema. Normal range of motion.      Cervical back: Normal range of motion and neck supple.      Left knee: Swelling present. No bony tenderness. Normal range of motion. Tenderness present over the patellar tendon. No medial joint line or lateral joint line tenderness.      Comments: No midline tenderness of C, T, L-spine  No tenderness palpation of hands or wrists bilaterally.  No snuffbox tenderness.     Neurological: She is alert and oriented to person, place, and time. She has normal strength. No sensory deficit.   Skin: Skin is warm and dry. Capillary refill takes less than 2 seconds.   Some bruising to left hand   Psychiatric: Thought content normal.         ED Course   Lac Repair    Date/Time: 11/22/2024 4:38 PM    Performed by: Manjit Drake MD  Authorized by: Manjit Drake MD    Consent:     Consent obtained:  Verbal    Consent given by:  Patient    Risks discussed:  Infection, pain, need for additional repair and poor cosmetic result  Universal protocol:     Patient identity confirmed:  Verbally with patient and arm band  Anesthesia:     Anesthesia method:  Local infiltration    Local anesthetic:  Lidocaine 1% w/o epi  Laceration details:     Location:  Lip    Lip location:  Upper exterior lip    Length (cm):  1  Pre-procedure details:     Preparation:  Patient was prepped and draped in usual sterile fashion and imaging obtained to evaluate for foreign bodies  Exploration:     Imaging outcome: foreign body not noted      Wound exploration: wound explored through full range of motion and entire depth of wound visualized    Treatment:     Area cleansed with:  Povidone-iodine    Amount of cleaning:  Standard    Irrigation solution:  Sterile water  Skin repair:     Repair method:  Sutures    Suture size:  5-0    Suture material:  Fast-absorbing gut    Suture technique:  Simple interrupted    Number of  sutures:  2  Approximation:     Approximation:  Close    Vermilion border well-aligned: yes    Repair type:     Repair type:  Intermediate  Post-procedure details:     Dressing:  Antibiotic ointment    Procedure completion:  Tolerated    Labs Reviewed - No data to display       Imaging Results    None          Medications   LIDOcaine (PF) 10 mg/ml (1%) injection 50 mg (has no administration in time range)   LIDOcaine 5 % patch 1 patch (has no administration in time range)   bacitracin zinc ointment 1 each (1 each Topical (Top) Given by Provider 11/22/24 2942)     Medical Decision Making  54-year-old woman with past medical history of migraines, anemia, obesity, anxiety, hyperlipidemia presents with a chief complaint of mechanical trip and fall.    Differential diagnosis includes, but not limited to:  Facial fracture, facial laceration, contusion, rib fracture, pneumothorax, muscular strain, knee injury    Patient already received ibuprofen at home.  Declined antispasmodics.  Will administer ice.  Her tetanus is up-to-date.  Repaired lip laceration as above.  Vermilion border was involved.  Did not appear to be through and through.  No head trauma or LOC to suggest ICH and I do not order head CT.  I ordered a CT max face to evaluate for facial fracture.  Left rib series and plain film of knee.  I do not feel she warranted dental splint at this time.  Advised the importance of close follow up with a dentist.  Patient provided with wound care instructions and return precautions. At this time, my shift is coming to a close and the patient was signed out to incoming MD pending remainder of imaging results.    Amount and/or Complexity of Data Reviewed  Radiology: ordered.    Risk  OTC drugs.  Prescription drug management.                                      Clinical Impression:  Final diagnoses:  [W19.XXXA] Fall (Primary)  [R07.89] Chest wall pain  [K08.89] Subluxation of tooth  [S01.511A] Lip laceration, initial  encounter                 Manjit Drake MD  11/22/24 9987

## 2024-11-26 ENCOUNTER — OFFICE VISIT (OUTPATIENT)
Dept: OBSTETRICS AND GYNECOLOGY | Facility: CLINIC | Age: 54
End: 2024-11-26
Payer: COMMERCIAL

## 2024-11-26 ENCOUNTER — HOSPITAL ENCOUNTER (OUTPATIENT)
Dept: RADIOLOGY | Facility: OTHER | Age: 54
Discharge: HOME | End: 2024-11-26
Attending: STUDENT IN AN ORGANIZED HEALTH CARE EDUCATION/TRAINING PROGRAM
Payer: COMMERCIAL

## 2024-11-26 VITALS
SYSTOLIC BLOOD PRESSURE: 110 MMHG | HEIGHT: 64 IN | BODY MASS INDEX: 30.99 KG/M2 | WEIGHT: 181.56 LBS | DIASTOLIC BLOOD PRESSURE: 82 MMHG

## 2024-11-26 DIAGNOSIS — N83.201 RIGHT OVARIAN CYST: Primary | ICD-10-CM

## 2024-11-26 DIAGNOSIS — N83.201 RIGHT OVARIAN CYST: ICD-10-CM

## 2024-11-26 PROCEDURE — 3044F HG A1C LEVEL LT 7.0%: CPT | Mod: CPTII,S$GLB,, | Performed by: OBSTETRICS & GYNECOLOGY

## 2024-11-26 PROCEDURE — 1159F MED LIST DOCD IN RCRD: CPT | Mod: CPTII,S$GLB,, | Performed by: OBSTETRICS & GYNECOLOGY

## 2024-11-26 PROCEDURE — 76856 US EXAM PELVIC COMPLETE: CPT | Mod: 26,,, | Performed by: RADIOLOGY

## 2024-11-26 PROCEDURE — 76830 TRANSVAGINAL US NON-OB: CPT | Mod: 26,,, | Performed by: RADIOLOGY

## 2024-11-26 PROCEDURE — 99213 OFFICE O/P EST LOW 20 MIN: CPT | Mod: S$GLB,,, | Performed by: OBSTETRICS & GYNECOLOGY

## 2024-11-26 PROCEDURE — 3008F BODY MASS INDEX DOCD: CPT | Mod: CPTII,S$GLB,, | Performed by: OBSTETRICS & GYNECOLOGY

## 2024-11-26 PROCEDURE — 3074F SYST BP LT 130 MM HG: CPT | Mod: CPTII,S$GLB,, | Performed by: OBSTETRICS & GYNECOLOGY

## 2024-11-26 PROCEDURE — 76856 US EXAM PELVIC COMPLETE: CPT | Mod: TC

## 2024-11-26 PROCEDURE — 99999 PR PBB SHADOW E&M-EST. PATIENT-LVL III: CPT | Mod: PBBFAC,,, | Performed by: OBSTETRICS & GYNECOLOGY

## 2024-11-26 PROCEDURE — 3079F DIAST BP 80-89 MM HG: CPT | Mod: CPTII,S$GLB,, | Performed by: OBSTETRICS & GYNECOLOGY

## 2024-11-26 PROCEDURE — 1160F RVW MEDS BY RX/DR IN RCRD: CPT | Mod: CPTII,S$GLB,, | Performed by: OBSTETRICS & GYNECOLOGY

## 2024-11-26 NOTE — PROGRESS NOTES
"  Chief Complaint: Follow up of Right Ovarian Cyst     HPI:      Cheryle Olivares is a 54 y.o.  who presents for follow up of right ovarian cyst. Cyst was found on a work up for abdominal pain in 2023. Has not changed in appearance during that time with follow up imaging in 2024 and now again in 2024. All three studies reviewed today.    Physical Exam:      /82 (BP Location: Left arm, Patient Position: Sitting)   Ht 5' 4" (1.626 m)   Wt 82.3 kg (181 lb 8.8 oz)   LMP 2015 (Exact Date)   BMI 31.16 kg/m²   Body mass index is 31.16 kg/m².    APPEARANCE: Well nourished, well developed, in no acute distress.  PSYCH: Appropriate mood and affect, judgement intact.    Results     US Pelvis Comp with Transvag NON-OB (xpd)  Narrative: EXAMINATION:  US PELVIS COMP WITH TRANSVAG NON-OB (XPD)    CLINICAL HISTORY:  Unspecified ovarian cyst, right side    FINDINGS:  There is been a hysterectomy.  The right ovary measures 3.6 x 3.1 x 2.9 cm, RI 0.55.    Left ovary not visualized.    There is a hyperechoic focus near the vaginal cuff measuring 5 mm.    There is a cystic right ovarian lesion measuring 3 cm.  Impression: Right ovarian simple cyst measuring 3 cm.    Hysterectomy.    Hyperechoic focus near the vaginal cuff measuring 5 mm probably not significant.    Electronically signed by: Aldair Kauffman MD  Date:    2024  Time:    08:18       Assessment/Plan:     Right ovarian cyst    - Reassurance provided. No further imaging indicated.  - RTC in 4 months for annual.       "

## 2024-12-02 ENCOUNTER — TELEPHONE (OUTPATIENT)
Dept: NEUROLOGY | Facility: CLINIC | Age: 54
End: 2024-12-02
Payer: COMMERCIAL

## 2024-12-02 DIAGNOSIS — G43.709 CHRONIC MIGRAINE W/O AURA W/O STATUS MIGRAINOSUS, NOT INTRACTABLE: Primary | ICD-10-CM

## 2024-12-03 NOTE — PROCEDURES
Follow up:   Overall doing well     Prior note:    In last 4 weeks has had more freq migraine   Use of ubrelvy 10 per month     Prior note:   Trigger for migraine - stress   Relieving factor  - exercise      Prior note:   Now has severe migraine 2/month   ubrelvy - partial relief     Prior note:   Overall doing much better   Feels BOTOX wore off 1 week ago     Prior note:   Overall unchanged   Still has nearly daily HAs  Ubrelvy  is effective      Prior note:   S/p PT, dry needling (SCM, occipitalis, trap) - very effective (but lasts only few days)   Clenched teeth - saw dentist, no solutions   HA are now 3/week. Also has days of CDH. Imitrex helps       Prior note:   Base of skull pain at early AM hours - radiates to front + dizziness      PT with dry needling helps when she has clusters of headaches   Stress is a trigger   Still doing yoga, meditation   Using mouth guard      Also reports throat 'spasms'. No benefit from cingulair      Taking Imitrex nearly daily      3/4/20 e-mail: Hello! First, I want to report that I'm still making amazing progress in reducing and almost eliminating my migraines! Physical therapy and stress reduction have been keys. I was recently able to add everyday exercise back into my routine, with few headaches afterward. This is miraculous! I have gone through all of my prescribed PT sessions, and I don't feel I need to continue on a regular basis. However, in order to continue to be physically active, both with at-home PT exercises and regular fitness (running, walking, yoga), it would be helpful to have PT every 2-3 weeks for awhile, as my muscles continue to adjust to better posture and more effective functioning. Is it possible to prescribe additional PT at less frequency? Thank you so much!     Prior note:   Stress -> Neck pain -> HA + nausea   overactivation of trap      Prior note:   Oct 2014 - diag with Hemicran cont (L) - pain points on the L side + photo/phono  Indomethacin  25mg PRN - helped   Currently 4/week Hz      Headache history:   Age of onset -  Childhood   Location - R or L   Nature of pain -  Throbbing   Prodrome - no   Aura -  No   Duration of headache - > 4 hrs   Time to peak intensity - hrs   Pain scale - range of intensity -  8/10  Frequency -  2-3/month   Status Migrainosus history - yes   Time of day of most headaches- anytime      Associated symptoms with the headache:   Meningeal symptoms - photophobia, phonophobia, exercise intolerance +   Nausea/vomitting+   Nasal drainage   Visual blurriness   Pallor/flushing  Dizziness +   Vertigo  Confusion  Impaired concentration +   Pain worsened with physical activity +   Neck pain   Cheek pain on R +      Cluster headache symptoms: none      Symptoms of increased intracranial pressure: none      Basilar migraine symptoms: none      Headache Triggers:   Stress +   Bright or flickering light  Strong smell  Vigorous exercise  Dietary factors   Visual strain   Weather changes +   Exertion  Heat (hot weather, hot baths or showers)  Menses     Other comorbid conditions:  Anxiety - yes   Motion sickness symptom - no      Treatment history:  Resolution of headache with sleep - yes   Meds tried:  H/o Renal stones   Fioricet, relpax, xanax   Flexeril   Medrol dose   maxalt - sluggish   trudhesa -feeling weird   Antiviral - not help   accupressure - helped   accupunture - helped   PT - helps   estradiol 0.05 mg/24 hr td ptsw (VIVELLE-DOT) 0.05 mg/24 hr  - twice weekly patch - helps with hot flashes, now on daily pill   Topamax 25 mg - not help   Gabapentin 100 mg  - drowsy, not help    mobic - helped   BOTOX#1 1/31/22 - dramatic improvement in HA   BOTOX#2 8/4/22 - dramatic improvement in HA    BOTOX#3 8/4/22 - dramatic improvement in HA    BOTOX#4 11/3/22 - dramatic improvement in HA    BOTOX#5 2/2/23 - dramatic improvement in HA    BOTOX#6 5/4/23 - dramatic improvement in HA    BOTOX#7 7/27/23 - dramatic improvement in HA, very mild  ptosis   BOTOX#8 10/6/23 - dramatic improvement in HA, very mild ptosis   BOTOX#9 1/18/24 - dramatic improvement in HA, very mild ptosis   BOTOX#10 3/21/24 - dramatic improvement in HA, very mild ptosis   BOTOX#11 5/27/24 - dramatic improvement in HA  BOTOX#12 8/22/24 - dramatic improvement in HA    Headache risk factors:   H/o TBI  - no   H/o Meningitis  - no   F/h Aneurysms  - no     Headache burden:   In the last three months:  Days missed from work = several   Days unable to perform activities of daily living = 0  Days unable to attend social activities = several       Review of Systems   Constitutional: Negative.    HENT: Negative.    Eyes: Negative.    Respiratory: Negative.    Cardiovascular: Negative.    Gastrointestinal: Negative.    Endocrine: Negative.    Genitourinary: Negative.    Musculoskeletal: neck pain.    Skin: Negative.    Allergic/Immunologic: Negative.    Neurological: Negative.    Hematological: Negative.    Psychiatric/Behavioral: anxiety.        Objective:   Physical Exam   Constitutional: She is oriented to person, place, and time. She appears well-developed and well-nourished.   Psychiatric: She has a normal mood and affect. Her behavior is normal. Judgment and thought content normal.       Assessment:       1. Intractable chronic migraine without aura   2. Myofacial pain (pressure points)- masseter, neck   3. Tonsillar hernia into foramen magnum      Her HA are unlikely from a TAC and more likely from her tonsillar herniation and posture      Neck and upper chest - vibrating sensation      MRI C spine  Impression       No focal disk abnormalities or significant central canal or neural foraminal stenosis.  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    2-3 punctate foci of T2/flair signal hyperintensity frontal subcortical white  matter which are nonspecific and of uncertain clinical significance.    Otherwise unremarkable MRI brain without evidence for acute infarction or enhancing lesion.    Electronically signed by: TRISH GOFF DO  Date: 10/24/14  Time: 14:27       Degenerative findings:   C2-C3: No spinal canal stenosis or neural foraminal narrowing.   C3-C4: Posterior disc osteophyte complex and left facet joint arthropathy resulting in mild left-sided neural foraminal narrowing.  No spinal canal stenosis.   C4-C5: Posterior disc osteophyte complex with no significant spinal canal stenosis or neural foraminal narrowing.   C5-C6: Posterior disc osteophyte complex with mild spinal canal stenosis.  No neural foraminal narrowing.   C6-C7: Posterior disc osteophyte complex with no significant spinal canal stenosis or neural foraminal narrowing.   C7-T1: No spinal canal stenosis or neural foraminal narrowing.  Redemonstration perineural cysts within the proximal extraforaminal space bilaterally at this level which possibly compressing on the exiting nerve roots.   Paraspinal muscles & soft tissues: Unremarkable.     Impression:   1. Redemonstration of perineural cysts bilaterally at the level of C7-T1, as above.  2. No significant spinal canal stenosis or neural foraminal narrowing throughout the cervical spine, as above.     Electronically signed by resident: Macho Boone  Date:                                            08/20/2020  Time:                                           09:29     Electronically signed by: Mauricio Pitt MD  Date:                                            08/20/2020  Time:                                           09:55      Results for LAISHA HUMPHREY (MRN [id number]) as of 10/11/2019 09:44    Ref. Range 2/5/2019 10:19   Vitamin B-12 Latest Ref Range: 210 - 950 pg/mL >2000 (H)   Thiamine Latest Ref Range: 38 - 122 ug/L 45   Vit D, 25-Hydroxy Latest Ref Range: 30 - 96 ng/mL 75   Vitamin B2 Latest Ref  Range: 1 - 19 mcg/L 18   Vitamin B6 Latest Ref Range: 5 - 50 ug/L 131 (H)   Antigliadin Ab IgG Latest Ref Range: <20 UNITS 2   Antigliadin Abs, IgA Latest Ref Range: <20 UNITS 7   TTG IgA Latest Ref Range: <20 UNITS 9   TTG IgG Latest Ref Range: <20 UNITS 3   Immunoglobulin A (IgA) Latest Ref Range: 70 - 400 mg/dL 172      Plan:   cont estrogen, Singulair, amlodipine   Breakthrough headache - imitrex 100 mg, xanax, mobic, ubrelvy, pain cream   Dentist - Floyd Erickson ([facility])     BOTOX treatment response:   Prior to initiating BOTOX, the patient had  24  migraine days per month on average. This meets criteria for chronic migraine.   After starting BOTOX, the patient experienced a reduction in  21  days per month   After starting BOTOX, the patient experienced a reduction in > 100 hours of migraine symptoms per month   After starting BOTOX, the patient experienced a 50% reduction in burden of migraine days per month   Based on this information, BOTOX is medically necessary for the management of the patient's chronic migraine.     BOTOX Injection intervals:   Patient reports a 'wearing off effect' prior to the subsequent BOTOX injections at 12 weeks. This occurs at week 10  Symptoms of this a 'wearing off effect' include - worsening of migraine headache frequency and intensity   Medications used during the 'wearing off effect' period include ubrelvy  Based on this information, it is medically necessary for the patient to receive BOTOX therapy at an interval of every 10 weeks for the management of chronic migraine.     BOTOX was performed as an indicated therapy for intractable chronic migraine headaches given that the patient failed > 2 prophylactic medications. Failed - Topamax, Elavil, Amlodipine, Gabapentin, Propranolol      Botulinum Toxin Injection Procedure   Pre-operative diagnosis: Chronic migraine   Post-operative diagnosis: Same   Procedure: Chemical neurolysis   After risks and benefits were explained  including bleeding, infection, worsening of pain, damage to the areas being injected, weakness of muscles, loss of muscle control, dysphagia if injecting the head or neck, facial droop if injecting the facial area, painful injection, allergic or other reaction to the medications being injected, and the failure of the procedure to help the problem, a signed consent was obtained.   The patient was placed in a comfortable area and the sites to be treated were identified.The area to be treated was prepped three times with alcohol and the alcohol allowed to dry. Next, a 30 gauge needle was used to inject the medication in the area to be treated.   Area(s) injected:   Total Botox used: 175 Units   Botox wastage: 25 Units   Injection sites:    muscle bilaterally ( a total of 5 units divided into 2 sites)   Procerus muscle (5 units)   Frontalis muscle bilaterally (a total of 20 units divided into 4 sites)   Temporalis muscle bilaterally (a total of 40 units divided into 8 sites)   Occipitalis muscle bilaterally (a total of 30 units divided into 6 sites)   Cervical paraspinal muscles (a total of 20 units divided into 4 sites)   Trapezius muscle bilaterally (a total of 30 units divided into 6 sites)  Masseter 5 units bel    Mastoid 5 units   Complications: none   RTC for the next Botox injection: 10 weeks          Patient verbalized understanding to plan. I answered all her questions to her satisfaction    Procedures

## 2024-12-11 ENCOUNTER — HOSPITAL ENCOUNTER (EMERGENCY)
Facility: HOSPITAL | Age: 54
Discharge: HOME | End: 2024-12-11
Attending: STUDENT IN AN ORGANIZED HEALTH CARE EDUCATION/TRAINING PROGRAM
Payer: COMMERCIAL

## 2024-12-11 VITALS
DIASTOLIC BLOOD PRESSURE: 86 MMHG | HEART RATE: 74 BPM | HEIGHT: 64 IN | SYSTOLIC BLOOD PRESSURE: 132 MMHG | OXYGEN SATURATION: 96 % | BODY MASS INDEX: 30.73 KG/M2 | RESPIRATION RATE: 18 BRPM | TEMPERATURE: 98 F | WEIGHT: 180 LBS

## 2024-12-11 DIAGNOSIS — S01.511A LIP LACERATION, INITIAL ENCOUNTER: Primary | ICD-10-CM

## 2024-12-11 DIAGNOSIS — T14.90XA INJURY: ICD-10-CM

## 2024-12-11 PROCEDURE — 63600175 PHARM REV CODE 636 W HCPCS: Performed by: NURSE PRACTITIONER

## 2024-12-11 PROCEDURE — 99285 EMERGENCY DEPT VISIT HI MDM: CPT | Mod: 25

## 2024-12-11 PROCEDURE — 12051 INTMD RPR FACE/MM 2.5 CM/<: CPT

## 2024-12-11 PROCEDURE — 96372 THER/PROPH/DIAG INJ SC/IM: CPT | Performed by: STUDENT IN AN ORGANIZED HEALTH CARE EDUCATION/TRAINING PROGRAM

## 2024-12-11 PROCEDURE — 63600175 PHARM REV CODE 636 W HCPCS: Performed by: STUDENT IN AN ORGANIZED HEALTH CARE EDUCATION/TRAINING PROGRAM

## 2024-12-11 RX ORDER — LIDOCAINE HYDROCHLORIDE 20 MG/ML
10 INJECTION, SOLUTION INFILTRATION; PERINEURAL
Status: COMPLETED | OUTPATIENT
Start: 2024-12-11 | End: 2024-12-11

## 2024-12-11 RX ORDER — DICLOFENAC SODIUM 10 MG/G
2 GEL TOPICAL 4 TIMES DAILY
Qty: 100 G | Refills: 0 | Status: SHIPPED | OUTPATIENT
Start: 2024-12-11

## 2024-12-11 RX ORDER — LIDOCAINE 50 MG/G
1 PATCH TOPICAL DAILY
Qty: 14 PATCH | Refills: 0 | Status: SHIPPED | OUTPATIENT
Start: 2024-12-11

## 2024-12-11 RX ORDER — TIZANIDINE 2 MG/1
4 TABLET ORAL EVERY 6 HOURS PRN
Qty: 21 TABLET | Refills: 0 | Status: SHIPPED | OUTPATIENT
Start: 2024-12-11 | End: 2024-12-21

## 2024-12-11 RX ORDER — KETOROLAC TROMETHAMINE 30 MG/ML
15 INJECTION, SOLUTION INTRAMUSCULAR; INTRAVENOUS
Status: COMPLETED | OUTPATIENT
Start: 2024-12-11 | End: 2024-12-11

## 2024-12-11 RX ADMIN — LIDOCAINE HYDROCHLORIDE 10 ML: 10 INJECTION, SOLUTION INFILTRATION; PERINEURAL at 02:12

## 2024-12-11 RX ADMIN — KETOROLAC TROMETHAMINE 15 MG: 30 INJECTION, SOLUTION INTRAMUSCULAR; INTRAVENOUS at 03:12

## 2024-12-11 NOTE — DISCHARGE INSTRUCTIONS
You were seen in the ER today due to a fall with a laceration to your right upper lip.  Please keep this area clean with water and mild soap, watch for any signs of infection, and have the stitches removed in 5-7 days.    Thank you for coming to our Emergency Department today. It is important to remember that some problems or medical conditions are difficult to diagnose and may not be found or addressed during your Emergency Department visit.  These conditions often start with non-specific symptoms and can only be diagnosed on follow up visits with your primary care physician or specialist when the symptoms continue or change. Please remember that all medical conditions can change, and we cannot predict how you will be feeling tomorrow or the next day. Return to the ER with any questions/concerns, new/concerning symptoms, worsening or failure to improve.       Be sure to follow up with your primary care doctor and review all labs/imaging/tests that were performed during your ER visit with them. It is very common for us to identify non-emergent incidental findings which must be followed up with your primary care physician.  Some labs/imaging/tests may be outside of the normal range, and require non-emergent follow-up and/or further investigation/treatment/procedures/testing to help diagnose/exclude/prevent complications or other potentially serious medical conditions. Some abnormalities may not have been discussed or addressed during your ER visit. Some lab results may not return during your ER visit but can be accessible by downloading the free [facility] Mychart vinicio or by visiting [email or web address] . It is important for you to review all labs/imaging/tests which are outside of the normal range with your physician.    An ER visit does not replace a primary care visit, and many screening tests or follow-up tests cannot be ordered by an ER doctor or performed by the ER. Some tests may even require  pre-approval.    If you do not have a primary care doctor, you may contact the one listed on your discharge paperwork or you may also call the [facility] Appointment Desk at [phone] , or [address] [place] [address] at  [phone] to schedule an appointment, or establish care with a primary care doctor or even a specialist and to obtain information about local resources. It is important to your health that you have a primary care doctor.    Please take all medications as directed. We have done our best to select a medication for you that will treat your condition however, all medications may potentially have side-effects and it is impossible to predict which medications may give you side-effects or what those side-effects (if any) those medications may give you.  If you feel that you are having a negative effect or side-effect of any medication you should stop taking those medications immediately and seek medical attention. If you feel that you are having a life-threatening reaction call 911.        Do not drive, swim, climb to height, take a bath, operate heavy machinery, drink alcohol or take potentially sedating medications, sign any legal documents or make any important decisions for 24 hours if you have received any pain medications, sedatives or mood altering drugs during your ER visit or within 24 hours of taking them if they have been prescribed to you.     You can find additional resources for Dentists, hearing aids, durable medical equipment, low cost pharmacies and other resources at [email or web address]

## 2024-12-11 NOTE — FIRST PROVIDER EVALUATION
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  No LOC       VITAL SIGNS   Initial Vitals [12/11/24 1349]   BP Pulse Resp Temp SpO2   137/76 98 18 97.8 °F (36.6 °C) 99 %      MAP       --            ALLERGIES    Review of patient's allergies indicates:   Allergen Reactions    Maxalt [rizatriptan]     Ciprofloxacin Nausea Only       PROVIDER TRIAGE NOTE  Verbal consent for the teletriage evaluation was given by the patient at the start of the evaluation.  All efforts will be made to maintain patient's privacy during the evaluation.      This is a teletriage evaluation of a 54 y.o. female presenting to the ED with c/o tripped and fell, busted lip. Also reports left knee and right shoulder.  No LOC.  Limited physical exam via telehealth: The patient is awake, alert, answering questions appropriately and is not in respiratory distress.  As the Teletriage provider, I performed an initial assessment and ordered appropriate labs and imaging studies, if any, to facilitate the patient's care once placed in the ED. Once a room is available, care and a full evaluation will be completed by an alternate ED provider.  Any additional orders and the final disposition will be determined by that provider.  All imaging and labs will not be followed-up by the Teletriage Team, including myself.         ORDERS  Labs Reviewed   HEPATITIS C ANTIBODY   HIV 1 / 2 ANTIBODY       ED Orders (720h ago, onward)      Start Ordered     Status Ordering Provider    12/12/24 0400 12/11/24 1407  Wound care routine - Clean wound  Daily        Comments: Clean Wound    Ordered YANIQUE LUU    12/12/24 0400 12/11/24 1407  Wound care routine - Irrigate wound  Daily        Comments: Irrigate Wound    Ordered YANIQUE LUU    12/12/24 0400 12/11/24 1407  Wound care routine - Sterile Gloves to Bedside   Daily        Comments: Provide sterile gloves to bedside    Ordered YANIQUE LUU    12/11/24 1415 12/11/24 1407  LIDOcaine HCL 10 mg/ml (1%) injection 10 mL  ED 1 Time         Ordered YANIQUE LUU    12/11/24 1407 12/11/24 1407  Provide suture tray to patient bedside  Once         Ordered YANIQUE LUU    12/11/24 1407 12/11/24 1407  Change dressing - apply dry sterile dressing  Once        Comments: Apply dry sterile dressing.    Ordered YANIQUE LUU    12/11/24 1407 12/11/24 1407  X-Ray Knee 3 View Left  1 time imaging         Ordered YANIQUE LUU    12/11/24 1407 12/11/24 1407  X-Ray Shoulder Trauma Right  1 time imaging         Ordered YANIQUE LUU    12/11/24 1353 12/11/24 1352  Hepatitis C Antibody  STAT         Ordered JOSHUA MAJOR    12/11/24 1353 12/11/24 1352  HIV 1/2 Ag/Ab (4th Gen)  STAT         Ordered JOSHUA MAJOR              Virtual Visit Note: The provider triage portion of this emergency department evaluation and documentation was performed via [employer], a HIPAA-compliant telemedicine application, in concert with a tele-presenter in the room. A face to face patient evaluation with one of my colleagues will occur once the patient is placed in an emergency department room.      DISCLAIMER: This note was prepared with [employer] voice recognition transcription software. Garbled syntax, mangled pronouns, and other bizarre constructions may be attributed to that software system.

## 2024-12-11 NOTE — ED TRIAGE NOTES
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 No LOC, - blood thinners. +lac to R side of lip.

## 2024-12-11 NOTE — ED PROVIDER NOTES
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  No LOC     HPI    55 yo F w/HTN, no other pmhx presenting with mechanical trip and fall 1 hour prior to arrival.    Patient was walking at work with uneven sidewalk, tripped on the sidewalk and fell forwards hitting the right side of her face, jaw, and falling onto both of her arms, reporting pain to her right shoulder, left knee, with loosening of her front upper teeth and laceration to her right upper lip.      No dizziness, no chest pain, no lightheadedness, no dyspnea or palpitations prior to or after the fall.  Was able to ambulate at baseline following the fall, no anticoagulation use.  No LOC.    Does have mild nausea  Review of patient's allergies indicates:   Allergen Reactions    Maxalt [rizatriptan]     Ciprofloxacin Nausea Only     Past Medical History:   Diagnosis Date    Allergy     Anemia     History of migraine headaches     Migraine headache     Morbid obesity     Preeclampsia, severe 7/15/2013     Past Surgical History:   Procedure Laterality Date    COLONOSCOPY N/A 12/29/2021    Procedure: COLONOSCOPY;  Surgeon: Mahendra Archer MD;  Location: [facility] ([address] [place] [address]);  Service: Endoscopy;  Laterality: N/A;  Pt. is Fully Vaccinated.EC    HYSTERECTOMY      TVH WITHOUT OOPHORECTOMY BLEEDING 12/2015 EK    left knee surgery       Family History   Problem Relation Name Age of Onset    Skin cancer Father      Melanoma Father      Skin cancer Brother      Melanoma Brother      Colon cancer Maternal Uncle      Miscarriages / Stillbirths Sister      Retinal detachment Mother Melissa Romero     Macular degeneration Mother Melissa Romero     Hypertension Mother Melissa Romero     Aortic stenosis Mother Melissa Romero     Hypertension Maternal Grandmother Dolores Diaz     Aortic stenosis Maternal Grandmother Dolores Diaz      Macular degeneration Maternal Grandfather Jayant Diaz     Cataracts Maternal Grandfather Jayant Diaz     Heart attack Maternal Grandfather Jayant Diaz     Heart disease Maternal Grandfather Jayant Diaz     Coronary artery disease Paternal Grandfather      No Known Problems Maternal Aunt      No Known Problems Paternal Aunt      No Known Problems Paternal Uncle      No Known Problems Paternal Grandmother      Amblyopia Neg Hx      Blindness Neg Hx      Cancer Neg Hx      Diabetes Neg Hx      Glaucoma Neg Hx      Strabismus Neg Hx      Stroke Neg Hx      Thyroid disease Neg Hx      Breast cancer Neg Hx      Ovarian cancer Neg Hx       Social History     Tobacco Use    Smoking status: Never    Smokeless tobacco: Never   Substance Use Topics    Alcohol use: No    Drug use: No     Review of Systems    Physical Exam     Initial Vitals [12/11/24 1349]   BP Pulse Resp Temp SpO2   137/76 98 18 97.8 °F (36.6 °C) 99 %      MAP       --         Physical Exam    Nursing note and vitals reviewed.  Constitutional: She appears well-developed and well-nourished.   HENT:   Head: Normocephalic and atraumatic.   No facial bony tenderness. No scalp hematoma.  R upper lip laceration full thickness   Eyes: EOM are normal.   Neck:   Normal range of motion.  Cardiovascular:  Normal rate and regular rhythm.           Pulmonary/Chest: No respiratory distress.   No chest tenderness to compression.    Abdominal: Abdomen is soft. She exhibits no distension.   No abdominal ecchymosis or tenderness noted.    Musculoskeletal:         General: No tenderness. Normal range of motion.      Cervical back: Normal range of motion.      Comments: No cervical, thoracic, or lumbar tenderness. No bony tenderness to all extremities. Ambulatory at baseline. Normal flexion and extension of all extremities.      Neurological: She is alert. GCS score is 15. GCS eye subscore is 4. GCS verbal subscore is 5. GCS motor subscore is 6.   Skin: Skin  is warm and dry.   No abrasion or laceration noted.    Psychiatric: She has a normal mood and affect. Thought content normal.         ED Course   Lac Repair    Date/Time: 12/22/2024 1:07 AM    Performed by: Sia Mantilla MD  Authorized by: Sia Mantilla MD    Consent:     Consent obtained:  Verbal    Risks discussed:  Infection, pain, poor cosmetic result and poor wound healing  Universal protocol:     Patient identity confirmed:  Verbally with patient  Anesthesia:     Anesthesia method:  Local infiltration    Local anesthetic:  Lidocaine 1% w/o epi  Laceration details:     Location:  Lip    Lip location:  Upper lip, full thickness    Vermilion border involved: no      Height of lip laceration:  Up to half vertical height    Length (cm):  2    Depth (mm):  7  Exploration:     Imaging outcome: foreign body not noted      Wound exploration: entire depth of wound visualized      Contaminated: no    Treatment:     Area cleansed with:  Saline    Amount of cleaning:  Standard    Irrigation volume:  500    Irrigation method:  Pressure wash    Debridement:  Minimal    Layers/structures repaired:  Deep subcutaneous  Deep subcutaneous:     Suture size:  5-0 and 4-0    Suture material:  Vicryl    Suture technique:  Simple interrupted    Number of sutures:  4  Skin repair:     Repair method:  Sutures    Suture size:  5-0    Suture material:  Chromic gut    Suture technique:  Simple interrupted    Number of sutures:  3  Approximation:     Approximation:  Close  Repair type:     Repair type:  Intermediate  Post-procedure details:     Dressing:  Open (no dressing)    Procedure completion:  Tolerated    Labs Reviewed - No data to display       Imaging Results              CT Maxillofacial Without Contrast (Final result)  Result time 12/11/24 16:53:15      Final result by Daniel Rick MD (12/11/24 16:53:15)                   Impression:      No acute facial fracture.      Electronically signed by: Daniel  Rick  Date:    12/11/2024  Time:    16:53               Narrative:    EXAMINATION:  CT MAXILLOFACIAL WITHOUT CONTRAST    CLINICAL HISTORY:  Facial trauma, blunt;    TECHNIQUE:  Low dose axial images, sagittal and coronal reformations were obtained through the face.  Contrast was not administered.    3D reformats were created on an independent workstation to evaluate the osseous structures.    COMPARISON:  11/22/2024    FINDINGS:  No acute facial fracture.  No acute mandibular fracture.    The globes and retro bulbar space spaces demonstrate no acute posttraumatic lesion.    Minimal chronic mucosal thickening at the floor the maxillary sinuses with no air-fluid levels identified.  The visualized mastoid air cells are clear.    Developmental elongation of the styloid process.                                       Medications   LIDOcaine HCL 20 mg/ml (2%) injection 10 mL (10 mLs Infiltration Given by Provider 12/11/24 1415)   ketorolac injection 15 mg (15 mg Intramuscular Given 12/11/24 8535)     Medical Decision Making  55 yo F w/HTN, no other pmhx presenting with mechanical trip and fall 1 hour prior to arrival with R upper lip laceration.      Differential diagnosis includes but is not limited to: dental avulsion, lip laceration, facial fracture    No facial fracture on CT max face, Tdap up to date, with no dental avulsions or fractures seen on exam, advised follow up to endodontist given loosening of teeth. Upper lip lac repaired with deep sutures and superficial sutures after irrigation.    I discussed with the patient/family the diagnosis, treatment plan, indications for return to the emergency department, as well as for expected follow-up. The patient/family verbalized an understanding. The patient/family is asked if there were any questions or concerns, which were addressed to patient/family satisfaction. Patient/family understands and is agreeable to the plan.     DISCLAIMER: This note was prepared with  MModal voice recognition transcription software. Garbled syntax, mangled pronouns, and other bizarre constructions may be attributed to that software system.      Amount and/or Complexity of Data Reviewed  Radiology: ordered.    Risk  Prescription drug management.               ED Course as of 24 0109   Wed Dec 11, 2024   1515 Tdap UTD [LF]   1802 Xrays not indicated for patient given no bony tenderness, ambulation at baseline, unable to cancel or reach XR, will continue to attempt but discharge patient   [LF]      ED Course User Index  [LF] Sia Mantilla MD                           Clinical Impression:  Final diagnoses:  [T14.90XA] Injury  [S01.511A] Lip laceration, initial encounter (Primary)          ED Disposition Condition    Discharge Stable          ED Prescriptions       Medication Sig Dispense Start Date End Date Auth. Provider    LIDOcaine (LIDODERM) 5 % Place 1 patch onto the skin once daily. Remove & Discard patch within 12 hours or as directed by MD 14 patch 2024 -- Sia Mantilla MD    diclofenac sodium (VOLTAREN ARTHRITIS PAIN) 1 % Gel Apply 2 g topically 4 (four) times daily. 100 g 2024 -- Sia Mantilla MD    tiZANidine (ZANAFLEX) 2 MG tablet () Take 2 tablets (4 mg total) by mouth every 6 (six) hours as needed. 21 tablet 2024 Sia Mantilla MD          Follow-up Information    None          Sia Mantilla MD  24 0110

## 2024-12-11 NOTE — ED NOTES
Patient identifiers verified and correct for Cheryle Olivares  LOC: The patient is aao x4  APPEARANCE: Patient appears uncomfortable but in no acute distress, patient is clean and well groomed.  SKIN: The skin is warm and dry, color consistent with ethnicity, patient has normal skin turgor and moist mucus membranes, +lac to lip  MUSCULOSKELETAL: Patient moving all extremities spontaneously, +L knee pain  RESPIRATORY: Airway is open and patent, respirations are spontaneous, patient has a normal effort and rate, no accessory muscle use noted, O2 sats noted at 99% on room air.  CARDIAC: Patient has a normal rate, no edema noted, capillary refill < 3 seconds.   GASTRO: Soft and non tender to palpation, no distention noted  : Pt denies any pain or frequency with urination.  NEURO: Pt opens eyes spontaneously, behavior appropriate to situation, follows commands, facial expression symmetrical, bilateral hand grasp equal and even, purposeful motor response noted, normal sensation in all extremities when touched with a finger.

## 2024-12-13 ENCOUNTER — PATIENT MESSAGE (OUTPATIENT)
Dept: PRIMARY CARE CLINIC | Facility: CLINIC | Age: 54
End: 2024-12-13
Payer: COMMERCIAL

## 2024-12-20 ENCOUNTER — OFFICE VISIT (OUTPATIENT)
Dept: PRIMARY CARE CLINIC | Facility: CLINIC | Age: 54
End: 2024-12-20
Payer: COMMERCIAL

## 2024-12-20 VITALS
DIASTOLIC BLOOD PRESSURE: 85 MMHG | HEART RATE: 86 BPM | OXYGEN SATURATION: 98 % | HEIGHT: 64 IN | SYSTOLIC BLOOD PRESSURE: 125 MMHG | BODY MASS INDEX: 31.17 KG/M2 | WEIGHT: 182.56 LBS

## 2024-12-20 DIAGNOSIS — S01.511D LIP LACERATION, SUBSEQUENT ENCOUNTER: ICD-10-CM

## 2024-12-20 DIAGNOSIS — N83.201 RIGHT OVARIAN CYST: ICD-10-CM

## 2024-12-20 DIAGNOSIS — E87.6 HYPOKALEMIA: ICD-10-CM

## 2024-12-20 DIAGNOSIS — Z12.31 ENCOUNTER FOR SCREENING MAMMOGRAM FOR MALIGNANT NEOPLASM OF BREAST: ICD-10-CM

## 2024-12-20 DIAGNOSIS — Z79.890 HORMONE REPLACEMENT THERAPY (HRT): ICD-10-CM

## 2024-12-20 DIAGNOSIS — Z09 HOSPITAL DISCHARGE FOLLOW-UP: Primary | ICD-10-CM

## 2024-12-20 DIAGNOSIS — E78.2 MIXED HYPERLIPIDEMIA: ICD-10-CM

## 2024-12-20 DIAGNOSIS — R29.6 FALLS: ICD-10-CM

## 2024-12-20 PROBLEM — S01.511A LIP LACERATION: Status: ACTIVE | Noted: 2024-12-20

## 2024-12-20 PROCEDURE — 99999 PR PBB SHADOW E&M-EST. PATIENT-LVL V: CPT | Mod: PBBFAC,,, | Performed by: STUDENT IN AN ORGANIZED HEALTH CARE EDUCATION/TRAINING PROGRAM

## 2024-12-20 NOTE — PROGRESS NOTES
Cheryle Olivares  1970        Subjective     Chief Complaint: F/u    History of Present Illness:  Ms. Cheryle Olivares is a 54 y.o. female who presents to clinic for ED f/u.     Recently saw OBGYN for ovarian cyst follow-up.     Accidentally tripped on someone's foot while at work. Fell on her face on gym floor. Ended up in the ED, needed stitches on her lips.   Saw Endodontist as well.   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 Needed stiches again. Has a stitch that needs removal today.  Not dizzy.   No palpitations.  Both times mechanical falls.  CT maxillofacial done x2.    HTN- on Amlodipine 5 mg.    BP Readings from Last 5 Encounters:   12/20/24 125/85   12/11/24 132/86   11/26/24 110/82   11/22/24 126/88   11/14/24 126/87     Hx of migraines- seeing Neuro botox. Doing well on Ubrelvy.       Review of Systems   Constitutional:  Negative for chills, fever and malaise/fatigue.   Respiratory:  Negative for shortness of breath.    Cardiovascular:  Negative for chest pain and palpitations.   Musculoskeletal:  Positive for falls and joint pain.   Neurological:  Positive for sensory change (numbness of lip in area of suture). Negative for dizziness and headaches.   Psychiatric/Behavioral:  The patient is not nervous/anxious.         PAST HISTORY:     Past Medical History:   Diagnosis Date    Allergy     Anemia     History of migraine headaches     Migraine headache     Morbid obesity     Preeclampsia, severe 7/15/2013       Past Surgical History:   Procedure Laterality Date    COLONOSCOPY N/A 12/29/2021    Procedure: COLONOSCOPY;  Surgeon: Mahendra Archer MD;  Location: [facility] ([address] [place] [address]);  Service: Endoscopy;  Laterality: N/A;  Pt. is Fully Vaccinated.EC    HYSTERECTOMY      TVH WITHOUT OOPHORECTOMY BLEEDING 12/2015 EK    left knee surgery         Family History   Problem Relation Name Age of Onset    Skin cancer Father      Melanoma Father       Skin cancer Brother      Melanoma Brother      Colon cancer Maternal Uncle      Miscarriages / Stillbirths Sister      Retinal detachment Mother Melissa Romero     Macular degeneration Mother Melissa Romero     Hypertension Mother Melissa Romero     Aortic stenosis Mother Melissa Romero     Hypertension Maternal Grandmother Dolores Severinoreiner     Aortic stenosis Maternal Grandmother Dolores Diaz     Macular degeneration Maternal Grandfather Jayant Diaz     Cataracts Maternal Grandfather Jayant Diaz     Heart attack Maternal Grandfather Jayant Daiz     Heart disease Maternal Grandfather Jayant Diaz     Coronary artery disease Paternal Grandfather      No Known Problems Maternal Aunt      No Known Problems Paternal Aunt      No Known Problems Paternal Uncle      No Known Problems Paternal Grandmother      Amblyopia Neg Hx      Blindness Neg Hx      Cancer Neg Hx      Diabetes Neg Hx      Glaucoma Neg Hx      Strabismus Neg Hx      Stroke Neg Hx      Thyroid disease Neg Hx      Breast cancer Neg Hx      Ovarian cancer Neg Hx           MEDICATIONS & ALLERGIES:     Current Outpatient Medications on File Prior to Visit   Medication Sig    amLODIPine (NORVASC) 5 MG tablet TAKE 1 TABLET(5 MG) BY MOUTH DAILY    azelastine (ASTELIN) 137 mcg (0.1 %) nasal spray 1 spray (137 mcg total) by Nasal route 2 (two) times daily.    cetirizine (ZYRTEC) 10 MG tablet Take 1 tablet by mouth once daily.    diclofenac sodium (VOLTAREN ARTHRITIS PAIN) 1 % Gel Apply 2 g topically 4 (four) times daily.    estradioL (ESTRACE) 0.5 MG tablet Take 1 tablet (0.5 mg total) by mouth once daily.    fluticasone propionate (FLONASE ALLERGY RELIEF NASL)     LIDOcaine (LIDODERM) 5 % Place 1 patch onto the skin once daily. Remove & Discard patch within 12 hours or as directed by MD    MAGNESIUM OXIDE ORAL Take 1 tablet by mouth once daily.    melatonin (MELATIN) 3 mg tablet Take 3 mg by mouth daily as needed. At night as needed     "montelukast (SINGULAIR) 10 mg tablet Take 1 tablet (10 mg total) by mouth once daily.    multivitamin capsule Take 1 capsule by mouth once daily.    pantoprazole (PROTONIX) 40 MG tablet TAKE 1 TABLET(40 MG) BY MOUTH DAILY    tiZANidine (ZANAFLEX) 2 MG tablet Take 2 tablets (4 mg total) by mouth every 6 (six) hours as needed.    ubrogepant (UBRELVY) 100 mg tablet Take 1 tablet (100 mg total) by mouth as needed for Migraine. If symptoms persist or return, may repeat dose after 2 hours. Maximum: 200 mg per 24 hours    ubrogepant (UBRELVY) 100 mg tablet Take 1 tablet (100 mg total) by mouth as needed for Migraine. If symptoms persist or return, may repeat dose after 2 hours. Maximum: 200 mg per 24 hours (Patient not taking: Reported on 12/20/2024)     Current Facility-Administered Medications on File Prior to Visit   Medication    onabotulinumtoxina injection 200 Units       Review of patient's allergies indicates:   Allergen Reactions    Maxalt [rizatriptan]     Ciprofloxacin Nausea Only       OBJECTIVE:     Vital Signs:  Vitals:    12/20/24 1437 12/20/24 1458   BP: (!) 144/90 125/85   BP Location: Left arm    Patient Position: Sitting    Pulse: 86    SpO2: 98%    Weight: 82.8 kg (182 lb 8.7 oz)    Height: 5' 4" (1.626 m)        Body mass index is 31.33 kg/m².     Physical Exam:  Physical Exam  Vitals and nursing note reviewed.   Constitutional:       General: She is not in acute distress.     Appearance: Normal appearance. She is not ill-appearing, toxic-appearing or diaphoretic.   HENT:      Head: Normocephalic and atraumatic.        Comments: Area of right upper lip examined. No suture visible.        Mouth/Throat:      Mouth: Mucous membranes are moist.      Pharynx: Oropharynx is clear.   Eyes:      General: No scleral icterus.        Right eye: No discharge.         Left eye: No discharge.      Conjunctiva/sclera: Conjunctivae normal.   Pulmonary:      Effort: Pulmonary effort is normal. No respiratory distress. " "  Musculoskeletal:         General: Normal range of motion.   Neurological:      Mental Status: She is alert and oriented to person, place, and time. Mental status is at baseline.      Gait: Gait normal.   Psychiatric:         Mood and Affect: Mood normal.         Behavior: Behavior normal.            Laboratory  Lab Results   Component Value Date    GLU 87 04/27/2024     04/27/2024    K 3.3 (L) 04/27/2024     04/27/2024    CO2 27 04/27/2024    BUN 11 04/27/2024    CREATININE 0.7 04/27/2024    CALCIUM 9.4 04/27/2024    MG 2.3 10/24/2014     Lab Results   Component Value Date    HGBA1C 5.0 04/27/2024     No results for input(s): "POCTGLUCOSE" in the last 72 hours.        ASSESSMENT & PLAN:   Ms. Cheryle Olivares is a 54 y.o. female who was seen today in clinic for f/u.     1. Hospital discharge follow-up    2. Falls    3. Lip laceration, subsequent encounter    4. Right ovarian cyst    5. Hormone replacement therapy (HRT)    6. Mixed hyperlipidemia  -     LIPID PANEL; Future; Expected date: 12/20/2024    7. Encounter for screening mammogram for malignant neoplasm of breast  -     Mammo Digital Screening Bilat w/ [facility]; Future; Expected date: 03/20/2025    8. Hypokalemia  -     BASIC METABOLIC PANEL; Future; Expected date: 12/20/2024         Iva Trujillo MD         "

## 2025-01-26 DIAGNOSIS — G43.709 CHRONIC MIGRAINE W/O AURA W/O STATUS MIGRAINOSUS, NOT INTRACTABLE: ICD-10-CM

## 2025-01-26 RX ORDER — UBROGEPANT 100 MG/1
100 TABLET ORAL
Qty: 16 TABLET | Refills: 12 | Status: CANCELLED | OUTPATIENT
Start: 2025-01-26 | End: 2025-11-23

## 2025-01-27 RX ORDER — UBROGEPANT 100 MG/1
100 TABLET ORAL
Qty: 16 TABLET | Refills: 12 | Status: SHIPPED | OUTPATIENT
Start: 2025-01-27 | End: 2025-11-24

## 2025-01-28 ENCOUNTER — PROCEDURE VISIT (OUTPATIENT)
Facility: CLINIC | Age: 55
End: 2025-01-28
Payer: COMMERCIAL

## 2025-01-28 VITALS
BODY MASS INDEX: 30.73 KG/M2 | SYSTOLIC BLOOD PRESSURE: 129 MMHG | HEART RATE: 86 BPM | HEIGHT: 64 IN | DIASTOLIC BLOOD PRESSURE: 87 MMHG | WEIGHT: 180 LBS

## 2025-01-28 DIAGNOSIS — G43.709 CHRONIC MIGRAINE W/O AURA W/O STATUS MIGRAINOSUS, NOT INTRACTABLE: Primary | ICD-10-CM

## 2025-01-28 PROCEDURE — 64615 CHEMODENERV MUSC MIGRAINE: CPT | Mod: S$GLB,,,

## 2025-01-28 NOTE — PROCEDURES
PROCEDURE NOTE:  BOTOX was performed as an indicated therapy for intractable chronic migraine headaches given that the patient failed more than 2 headache medications. Patient has continued to achieve a greater than 50% reduction in the frequency of their migraines with continued Botox injections.  Wishes to proceed with today's injections as scheduled.      A time out was conducted just before the start of the procedure to verify the correct patient and procedure, procedure location, and all relevant critical information.      Botulinum Toxin Injection Procedure      PROCEDURE PERFORMED: Botulinum toxin injection (79790)  CLINICAL INDICATION: Chronic Migraines  After risks and benefits were explained including bleeding, infection, worsening of pain, damage to the areas being injected, weakness of muscles, loss of muscle control, dysphagia if injecting the head or neck, facial droop if injecting the facial area, painful injection, allergic or other reaction to the medications being injected, and the failure of the procedure to help the problem, a signed consent was obtained.   The patient was placed in a comfortable area and the sites to be treated were identified.The area to be treated was prepped three times with alcohol and the alcohol allowed to dry. Next, a 30 gauge needle was used to inject the medication in the area to be treated.      Total Botox used: 155 Units   Unavoidable waste: 45 Units      Injection sites:    muscle bilaterally ( a total of 10 units divided into 2 sites)   Procerus muscle (5 units)   Frontalis muscle bilaterally (a total of 20 units divided into 4 sites)   Temporalis muscle bilaterally (a total of 40 units divided into 8 sites)   Occipitalis muscle bilaterally (a total of 30 units divided into 6 sites)   Cervical paraspinal muscles (a total of 20 units divided into 4 sites)   Trapezius muscle bilaterally (a total of 30 units divided into 6 sites)     Complications: none    RTC for the next Botox injection: 12 weeks     Problem List Items Addressed This Visit          Neuro    Chronic migraine w/o aura w/o status migrainosus, not intractable - Primary    Relevant Medications    onabotulinumtoxina injection 200 Units (Start on 1/28/2025 12:30 PM)            MIKE Vaz  [facility]   [phone]

## 2025-02-10 DIAGNOSIS — Z71.89 COUNSELING FOR ESTROGEN REPLACEMENT THERAPY: ICD-10-CM

## 2025-02-10 RX ORDER — ESTRADIOL 0.5 MG/1
0.5 TABLET ORAL
Qty: 90 TABLET | Refills: 0 | Status: SHIPPED | OUTPATIENT
Start: 2025-02-10

## 2025-02-18 ENCOUNTER — PATIENT MESSAGE (OUTPATIENT)
Facility: CLINIC | Age: 55
End: 2025-02-18
Payer: COMMERCIAL

## 2025-02-26 DIAGNOSIS — J32.9 SINUSITIS, UNSPECIFIED CHRONICITY, UNSPECIFIED LOCATION: ICD-10-CM

## 2025-02-26 RX ORDER — MONTELUKAST SODIUM 10 MG/1
10 TABLET ORAL
Qty: 90 TABLET | Refills: 3 | Status: SHIPPED | OUTPATIENT
Start: 2025-02-26

## 2025-02-26 NOTE — TELEPHONE ENCOUNTER
No care due was identified.  [employer] Embedded Care Due Messages. Reference number: [id number].   2/26/2025 3:44:57 AM CST

## 2025-04-11 ENCOUNTER — TELEPHONE (OUTPATIENT)
Dept: NEUROLOGY | Facility: CLINIC | Age: 55
End: 2025-04-11
Payer: COMMERCIAL

## 2025-04-11 NOTE — TELEPHONE ENCOUNTER
Called pt to check if she could come in today (4/11) at 3pm for a botox appt. Pt declined appt saying she was unavailable

## 2025-04-15 ENCOUNTER — TELEPHONE (OUTPATIENT)
Dept: NEUROLOGY | Facility: CLINIC | Age: 55
End: 2025-04-15
Payer: COMMERCIAL

## 2025-04-15 NOTE — TELEPHONE ENCOUNTER
Called patient to schedule botox appt on 4/17 at 1pm with Dr. Granda. Patient verbalized agreement

## 2025-04-17 ENCOUNTER — PROCEDURE VISIT (OUTPATIENT)
Dept: NEUROLOGY | Facility: CLINIC | Age: 55
End: 2025-04-17
Payer: COMMERCIAL

## 2025-04-17 ENCOUNTER — HOSPITAL ENCOUNTER (OUTPATIENT)
Dept: RADIOLOGY | Facility: HOSPITAL | Age: 55
Discharge: HOME | End: 2025-04-17
Attending: STUDENT IN AN ORGANIZED HEALTH CARE EDUCATION/TRAINING PROGRAM
Payer: COMMERCIAL

## 2025-04-17 VITALS
SYSTOLIC BLOOD PRESSURE: 119 MMHG | HEIGHT: 64 IN | HEART RATE: 81 BPM | BODY MASS INDEX: 29.92 KG/M2 | WEIGHT: 175.25 LBS | DIASTOLIC BLOOD PRESSURE: 82 MMHG

## 2025-04-17 VITALS — WEIGHT: 180 LBS | HEIGHT: 64 IN | BODY MASS INDEX: 30.73 KG/M2

## 2025-04-17 DIAGNOSIS — G43.709 CHRONIC MIGRAINE W/O AURA W/O STATUS MIGRAINOSUS, NOT INTRACTABLE: Primary | ICD-10-CM

## 2025-04-17 DIAGNOSIS — Z12.31 ENCOUNTER FOR SCREENING MAMMOGRAM FOR MALIGNANT NEOPLASM OF BREAST: ICD-10-CM

## 2025-04-17 PROCEDURE — 77063 BREAST TOMOSYNTHESIS BI: CPT | Mod: TC

## 2025-04-17 NOTE — PROCEDURES
Follow up:   Overall doing well     Prior note:    In last 4 weeks has had more freq migraine   Use of ubrelvy 10 per month     Prior note:   Trigger for migraine - stress   Relieving factor  - exercise      Prior note:   Now has severe migraine 2/month   ubrelvy - partial relief     Prior note:   Overall doing much better   Feels BOTOX wore off 1 week ago     Prior note:   Overall unchanged   Still has nearly daily HAs  Ubrelvy  is effective      Prior note:   S/p PT, dry needling (SCM, occipitalis, trap) - very effective (but lasts only few days)   Clenched teeth - saw dentist, no solutions   HA are now 3/week. Also has days of CDH. Imitrex helps       Prior note:   Base of skull pain at early AM hours - radiates to front + dizziness      PT with dry needling helps when she has clusters of headaches   Stress is a trigger   Still doing yoga, meditation   Using mouth guard      Also reports throat 'spasms'. No benefit from cingulair      Taking Imitrex nearly daily      3/4/20 e-mail: Hello! First, I want to report that I'm still making amazing progress in reducing and almost eliminating my migraines! Physical therapy and stress reduction have been keys. I was recently able to add everyday exercise back into my routine, with few headaches afterward. This is miraculous! I have gone through all of my prescribed PT sessions, and I don't feel I need to continue on a regular basis. However, in order to continue to be physically active, both with at-home PT exercises and regular fitness (running, walking, yoga), it would be helpful to have PT every 2-3 weeks for awhile, as my muscles continue to adjust to better posture and more effective functioning. Is it possible to prescribe additional PT at less frequency? Thank you so much!     Prior note:   Stress -> Neck pain -> HA + nausea   overactivation of trap      Prior note:   Oct 2014 - diag with Hemicran cont (L) - pain points on the L side + photo/phono  Indomethacin  25mg PRN - helped   Currently 4/week Hz      Headache history:   Age of onset -  Childhood   Location - R or L   Nature of pain -  Throbbing   Prodrome - no   Aura -  No   Duration of headache - > 4 hrs   Time to peak intensity - hrs   Pain scale - range of intensity -  8/10  Frequency -  2-3/month   Status Migrainosus history - yes   Time of day of most headaches- anytime      Associated symptoms with the headache:   Meningeal symptoms - photophobia, phonophobia, exercise intolerance +   Nausea/vomitting+   Nasal drainage   Visual blurriness   Pallor/flushing  Dizziness +   Vertigo  Confusion  Impaired concentration +   Pain worsened with physical activity +   Neck pain   Cheek pain on R +      Cluster headache symptoms: none      Symptoms of increased intracranial pressure: none      Basilar migraine symptoms: none      Headache Triggers:   Stress +   Bright or flickering light  Strong smell  Vigorous exercise  Dietary factors   Visual strain   Weather changes +   Exertion  Heat (hot weather, hot baths or showers)  Menses     Other comorbid conditions:  Anxiety - yes   Motion sickness symptom - no      Treatment history:  Resolution of headache with sleep - yes   Meds tried:  H/o Renal stones   Fioricet, relpax, xanax   Flexeril   Medrol dose   maxalt - sluggish   trudhesa -feeling weird   Antiviral - not help   accupressure - helped   accupunture - helped   PT - helps   estradiol 0.05 mg/24 hr td ptsw (VIVELLE-DOT) 0.05 mg/24 hr  - twice weekly patch - helps with hot flashes, now on daily pill   Topamax 25 mg - not help   Gabapentin 100 mg  - drowsy, not help    mobic - helped   BOTOX#1 1/31/22 - dramatic improvement in HA   BOTOX#2 8/4/22 - dramatic improvement in HA    BOTOX#3 8/4/22 - dramatic improvement in HA    BOTOX#4 11/3/22 - dramatic improvement in HA    BOTOX#5 2/2/23 - dramatic improvement in HA    BOTOX#6 5/4/23 - dramatic improvement in HA    BOTOX#7 7/27/23 - dramatic improvement in HA, very mild  ptosis   BOTOX#8 10/6/23 - dramatic improvement in HA, very mild ptosis   BOTOX#9 1/18/24 - dramatic improvement in HA, very mild ptosis   BOTOX#10 3/21/24 - dramatic improvement in HA, very mild ptosis   BOTOX#11 5/27/24 - dramatic improvement in HA  BOTOX#12 8/22/24 - dramatic improvement in HA  BOTOX#13 11/14/24 - dramatic improvement in HA    Headache risk factors:   H/o TBI  - no   H/o Meningitis  - no   F/h Aneurysms  - no     Headache burden:   In the last three months:  Days missed from work = several   Days unable to perform activities of daily living = 0  Days unable to attend social activities = several       Review of Systems   Constitutional: Negative.    HENT: Negative.    Eyes: Negative.    Respiratory: Negative.    Cardiovascular: Negative.    Gastrointestinal: Negative.    Endocrine: Negative.    Genitourinary: Negative.    Musculoskeletal: neck pain.    Skin: Negative.    Allergic/Immunologic: Negative.    Neurological: Negative.    Hematological: Negative.    Psychiatric/Behavioral: anxiety.        Objective:   Physical Exam   Constitutional: She is oriented to person, place, and time. She appears well-developed and well-nourished.   Psychiatric: She has a normal mood and affect. Her behavior is normal. Judgment and thought content normal.       Assessment:       1. Intractable chronic migraine without aura   2. Myofacial pain (pressure points)- masseter, neck   3. Tonsillar hernia into foramen magnum      Her HA are unlikely from a TAC and more likely from her tonsillar herniation and posture      Neck and upper chest - vibrating sensation      MRI C spine  Impression       No focal disk abnormalities or significant central canal or neural foraminal stenosis.  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    2-3 punctate foci of T2/flair  signal hyperintensity frontal subcortical white matter which are nonspecific and of uncertain clinical significance.    Otherwise unremarkable MRI brain without evidence for acute infarction or enhancing lesion.    Electronically signed by: TRISH GOFF DO  Date: 10/24/14  Time: 14:27       Degenerative findings:   C2-C3: No spinal canal stenosis or neural foraminal narrowing.   C3-C4: Posterior disc osteophyte complex and left facet joint arthropathy resulting in mild left-sided neural foraminal narrowing.  No spinal canal stenosis.   C4-C5: Posterior disc osteophyte complex with no significant spinal canal stenosis or neural foraminal narrowing.   C5-C6: Posterior disc osteophyte complex with mild spinal canal stenosis.  No neural foraminal narrowing.   C6-C7: Posterior disc osteophyte complex with no significant spinal canal stenosis or neural foraminal narrowing.   C7-T1: No spinal canal stenosis or neural foraminal narrowing.  Redemonstration perineural cysts within the proximal extraforaminal space bilaterally at this level which possibly compressing on the exiting nerve roots.   Paraspinal muscles & soft tissues: Unremarkable.     Impression:   1. Redemonstration of perineural cysts bilaterally at the level of C7-T1, as above.  2. No significant spinal canal stenosis or neural foraminal narrowing throughout the cervical spine, as above.     Electronically signed by resident: Macho Boone  Date:                                            08/20/2020  Time:                                           09:29     Electronically signed by: Mauricio Pitt MD  Date:                                            08/20/2020  Time:                                           09:55      Results for MILAGRO LAISHA DIGGSERT (MRN [id number]) as of 10/11/2019 09:44    Ref. Range 2/5/2019 10:19   Vitamin B-12 Latest Ref Range: 210 - 950 pg/mL >2000 (H)   Thiamine Latest Ref Range: 38 - 122 ug/L 45   Vit D, 25-Hydroxy Latest Ref  Range: 30 - 96 ng/mL 75   Vitamin B2 Latest Ref Range: 1 - 19 mcg/L 18   Vitamin B6 Latest Ref Range: 5 - 50 ug/L 131 (H)   Antigliadin Ab IgG Latest Ref Range: <20 UNITS 2   Antigliadin Abs, IgA Latest Ref Range: <20 UNITS 7   TTG IgA Latest Ref Range: <20 UNITS 9   TTG IgG Latest Ref Range: <20 UNITS 3   Immunoglobulin A (IgA) Latest Ref Range: 70 - 400 mg/dL 172      Plan:   cont estrogen, Singulair, amlodipine   Breakthrough headache - imitrex 100 mg, xanax, mobic, ubrelvy, pain cream   Dentist - Floyd Erickson ([facility])     BOTOX treatment response:   Prior to initiating BOTOX, the patient had  24  migraine days per month on average. This meets criteria for chronic migraine.   After starting BOTOX, the patient experienced a reduction in  21  days per month   After starting BOTOX, the patient experienced a reduction in > 100 hours of migraine symptoms per month   After starting BOTOX, the patient experienced a 50% reduction in burden of migraine days per month   Based on this information, BOTOX is medically necessary for the management of the patient's chronic migraine.     BOTOX Injection intervals:   Patient reports a 'wearing off effect' prior to the subsequent BOTOX injections at 12 weeks. This occurs at week 10  Symptoms of this a 'wearing off effect' include - worsening of migraine headache frequency and intensity   Medications used during the 'wearing off effect' period include ubrelvy  Based on this information, it is medically necessary for the patient to receive BOTOX therapy at an interval of every 10 weeks for the management of chronic migraine.     BOTOX was performed as an indicated therapy for intractable chronic migraine headaches given that the patient failed > 2 prophylactic medications. Failed - Topamax, Elavil, Amlodipine, Gabapentin, Propranolol      Botulinum Toxin Injection Procedure   Pre-operative diagnosis: Chronic migraine   Post-operative diagnosis: Same   Procedure: Chemical  neurolysis   After risks and benefits were explained including bleeding, infection, worsening of pain, damage to the areas being injected, weakness of muscles, loss of muscle control, dysphagia if injecting the head or neck, facial droop if injecting the facial area, painful injection, allergic or other reaction to the medications being injected, and the failure of the procedure to help the problem, a signed consent was obtained.   The patient was placed in a comfortable area and the sites to be treated were identified.The area to be treated was prepped three times with alcohol and the alcohol allowed to dry. Next, a 30 gauge needle was used to inject the medication in the area to be treated.   Area(s) injected:   Total Botox used: 175 Units   Botox wastage: 25 Units   Injection sites:    muscle bilaterally ( a total of 5 units divided into 2 sites)   Procerus muscle (5 units)   Frontalis muscle bilaterally (a total of 20 units divided into 4 sites)   Temporalis muscle bilaterally (a total of 40 units divided into 8 sites)   Occipitalis muscle bilaterally (a total of 30 units divided into 6 sites)   Cervical paraspinal muscles (a total of 20 units divided into 4 sites)   Trapezius muscle bilaterally (a total of 30 units divided into 6 sites)  Masseter 5 units bel    Mastoid 5 units   Complications: none   RTC for the next Botox injection: 10 weeks          Patient verbalized understanding to plan. I answered all her questions to her satisfaction    Procedures

## 2025-04-21 ENCOUNTER — RESULTS FOLLOW-UP (OUTPATIENT)
Dept: PRIMARY CARE CLINIC | Facility: CLINIC | Age: 55
End: 2025-04-21

## 2025-05-09 ENCOUNTER — PATIENT MESSAGE (OUTPATIENT)
Dept: OPTOMETRY | Facility: CLINIC | Age: 55
End: 2025-05-09
Payer: COMMERCIAL

## 2025-05-11 DIAGNOSIS — Z71.89 COUNSELING FOR ESTROGEN REPLACEMENT THERAPY: ICD-10-CM

## 2025-05-12 RX ORDER — ESTRADIOL 0.5 MG/1
TABLET ORAL
Qty: 90 TABLET | Refills: 0 | Status: SHIPPED | OUTPATIENT
Start: 2025-05-12

## 2025-05-13 ENCOUNTER — OFFICE VISIT (OUTPATIENT)
Dept: OPTOMETRY | Facility: CLINIC | Age: 55
End: 2025-05-13
Payer: COMMERCIAL

## 2025-05-13 DIAGNOSIS — H43.812 POSTERIOR VITREOUS DETACHMENT OF LEFT EYE: ICD-10-CM

## 2025-05-13 DIAGNOSIS — H53.10 SUBJECTIVE VISION DISTURBANCE, LEFT EYE: Primary | ICD-10-CM

## 2025-05-13 DIAGNOSIS — G43.109 OCULAR MIGRAINE: ICD-10-CM

## 2025-05-13 PROCEDURE — 92014 COMPRE OPH EXAM EST PT 1/>: CPT | Mod: S$GLB,,, | Performed by: OPTOMETRIST

## 2025-05-13 PROCEDURE — 99999 PR PBB SHADOW E&M-EST. PATIENT-LVL III: CPT | Mod: PBBFAC,,, | Performed by: OPTOMETRIST

## 2025-05-13 PROCEDURE — 1159F MED LIST DOCD IN RCRD: CPT | Mod: CPTII,S$GLB,, | Performed by: OPTOMETRIST

## 2025-05-13 NOTE — PROGRESS NOTES
HPI    Pt reports had 2 falls head first   Pt reports footing was not stable   Pt reports parent and her bumped into each other     Pt presents today looks like she is looking through film periphery OS>OD   Pt reports continued but fluctuates in visibility   Pt report started few weeks ago   Pt reports started 2 weeks ago loss of vision   Pt reports few nights ago did not feel like she could see almost like a   green glow  Pt reports watery eye os   Last edited by Saida Velásquez on 5/13/2025  2:14 PM.            Assessment /Plan     For exam results, see Encounter Report.    Subjective vision disturbance, left eye  -Most consistent with Ocular Migraine, could also be After image, Hypotension or Hypoglycemia    Ocular migraine  -Cont'd care Neuro    Posterior vitreous detachment of left eye  -Stable with previous  -source of intermittent shadow OS vision      RTC 1 yr

## 2025-05-16 DIAGNOSIS — K22.4 ESOPHAGEAL SPASM: ICD-10-CM

## 2025-05-16 RX ORDER — PANTOPRAZOLE SODIUM 40 MG/1
40 TABLET, DELAYED RELEASE ORAL
Qty: 90 TABLET | Refills: 2 | Status: SHIPPED | OUTPATIENT
Start: 2025-05-16

## 2025-05-16 NOTE — TELEPHONE ENCOUNTER
No care due was identified.  [facility] Embedded Care Due Messages. Reference number: [id number].   5/16/2025 3:44:57 AM CDT

## 2025-05-16 NOTE — TELEPHONE ENCOUNTER
Refill Decision Note   Cheryle Marshall  is requesting a refill authorization.  Brief Assessment and Rationale for Refill:  Approve     Medication Therapy Plan:        Comments:     Note composed:7:27 AM 05/16/2025

## 2025-05-27 ENCOUNTER — TELEPHONE (OUTPATIENT)
Dept: NEUROLOGY | Facility: CLINIC | Age: 55
End: 2025-05-27
Payer: COMMERCIAL

## 2025-05-29 DIAGNOSIS — I10 ESSENTIAL HYPERTENSION: ICD-10-CM

## 2025-05-29 RX ORDER — AMLODIPINE BESYLATE 5 MG/1
5 TABLET ORAL DAILY
Qty: 90 TABLET | Refills: 1 | Status: SHIPPED | OUTPATIENT
Start: 2025-05-29

## 2025-05-29 NOTE — TELEPHONE ENCOUNTER
No care due was identified.  [facility] Embedded Care Due Messages. Reference number: [id number].   5/29/2025 3:45:24 AM CDT

## 2025-05-29 NOTE — TELEPHONE ENCOUNTER
Refill Decision Note   Cheryle Marshall  is requesting a refill authorization.  Brief Assessment and Rationale for Refill:  Approve     Medication Therapy Plan:         Comments:     Note composed:9:51 AM 05/29/2025

## 2025-06-20 ENCOUNTER — TELEPHONE (OUTPATIENT)
Dept: NEUROLOGY | Facility: CLINIC | Age: 55
End: 2025-06-20
Payer: COMMERCIAL

## 2025-07-17 ENCOUNTER — PROCEDURE VISIT (OUTPATIENT)
Dept: NEUROLOGY | Facility: CLINIC | Age: 55
End: 2025-07-17
Payer: COMMERCIAL

## 2025-07-17 ENCOUNTER — TELEPHONE (OUTPATIENT)
Dept: NEUROLOGY | Facility: CLINIC | Age: 55
End: 2025-07-17
Payer: COMMERCIAL

## 2025-07-17 DIAGNOSIS — G43.719 INTRACTABLE CHRONIC MIGRAINE WITHOUT AURA AND WITHOUT STATUS MIGRAINOSUS: Primary | ICD-10-CM

## 2025-07-17 NOTE — PROCEDURES
Follow up:   Overall doing well     Prior note:    In last 4 weeks has had more freq migraine   Use of ubrelvy 10 per month     Prior note:   Trigger for migraine - stress   Relieving factor  - exercise      Prior note:   Now has severe migraine 2/month   ubrelvy - partial relief     Prior note:   Overall doing much better   Feels BOTOX wore off 1 week ago     Prior note:   Overall unchanged   Still has nearly daily HAs  Ubrelvy  is effective      Prior note:   S/p PT, dry needling (SCM, occipitalis, trap) - very effective (but lasts only few days)   Clenched teeth - saw dentist, no solutions   HA are now 3/week. Also has days of CDH. Imitrex helps       Prior note:   Base of skull pain at early AM hours - radiates to front + dizziness      PT with dry needling helps when she has clusters of headaches   Stress is a trigger   Still doing yoga, meditation   Using mouth guard      Also reports throat 'spasms'. No benefit from cingulair      Taking Imitrex nearly daily      3/4/20 e-mail: Hello! First, I want to report that I'm still making amazing progress in reducing and almost eliminating my migraines! Physical therapy and stress reduction have been keys. I was recently able to add everyday exercise back into my routine, with few headaches afterward. This is miraculous! I have gone through all of my prescribed PT sessions, and I don't feel I need to continue on a regular basis. However, in order to continue to be physically active, both with at-home PT exercises and regular fitness (running, walking, yoga), it would be helpful to have PT every 2-3 weeks for awhile, as my muscles continue to adjust to better posture and more effective functioning. Is it possible to prescribe additional PT at less frequency? Thank you so much!     Prior note:   Stress -> Neck pain -> HA + nausea   overactivation of trap      Prior note:   Oct 2014 - diag with Hemicran cont (L) - pain points on the L side + photo/phono  Indomethacin  25mg PRN - helped   Currently 4/week Hz      Headache history:   Age of onset -  Childhood   Location - R or L   Nature of pain -  Throbbing   Prodrome - no   Aura -  No   Duration of headache - > 4 hrs   Time to peak intensity - hrs   Pain scale - range of intensity -  8/10  Frequency -  2-3/month   Status Migrainosus history - yes   Time of day of most headaches- anytime      Associated symptoms with the headache:   Meningeal symptoms - photophobia, phonophobia, exercise intolerance +   Nausea/vomitting+   Nasal drainage   Visual blurriness   Pallor/flushing  Dizziness +   Vertigo  Confusion  Impaired concentration +   Pain worsened with physical activity +   Neck pain   Cheek pain on R +      Cluster headache symptoms: none      Symptoms of increased intracranial pressure: none      Basilar migraine symptoms: none      Headache Triggers:   Stress +   Bright or flickering light  Strong smell  Vigorous exercise  Dietary factors   Visual strain   Weather changes +   Exertion  Heat (hot weather, hot baths or showers)  Menses     Other comorbid conditions:  Anxiety - yes   Motion sickness symptom - no      Treatment history:  Resolution of headache with sleep - yes   Meds tried:  H/o Renal stones   Fioricet, relpax, xanax   Flexeril   Medrol dose   maxalt - sluggish   trudhesa -feeling weird   Antiviral - not help   accupressure - helped   accupunture - helped   PT - helps   estradiol 0.05 mg/24 hr td ptsw (VIVELLE-DOT) 0.05 mg/24 hr  - twice weekly patch - helps with hot flashes, now on daily pill   Topamax 25 mg - not help   Gabapentin 100 mg  - drowsy, not help    mobic - helped   BOTOX#1 1/31/22 - dramatic improvement in HA   BOTOX#2 8/4/22 - dramatic improvement in HA    BOTOX#3 8/4/22 - dramatic improvement in HA    BOTOX#4 11/3/22 - dramatic improvement in HA    BOTOX#5 2/2/23 - dramatic improvement in HA    BOTOX#6 5/4/23 - dramatic improvement in HA    BOTOX#7 7/27/23 - dramatic improvement in HA, very mild  ptosis   BOTOX#8 10/6/23 - dramatic improvement in HA, very mild ptosis   BOTOX#9 1/18/24 - dramatic improvement in HA, very mild ptosis   BOTOX#10 3/21/24 - dramatic improvement in HA, very mild ptosis   BOTOX#11 5/27/24 - dramatic improvement in HA  BOTOX#12 8/22/24 - dramatic improvement in HA  BOTOX#13 11/14/24 - dramatic improvement in HA  BOTOX#14 1/25/25 - dramatic improvement in HA  BOTOX#15 4/17/25 - dramatic improvement in HA    Headache risk factors:   H/o TBI  - no   H/o Meningitis  - no   F/h Aneurysms  - no     Headache burden:   In the last three months:  Days missed from work = several   Days unable to perform activities of daily living = 0  Days unable to attend social activities = several       Review of Systems   Constitutional: Negative.    HENT: Negative.    Eyes: Negative.    Respiratory: Negative.    Cardiovascular: Negative.    Gastrointestinal: Negative.    Endocrine: Negative.    Genitourinary: Negative.    Musculoskeletal: neck pain.    Skin: Negative.    Allergic/Immunologic: Negative.    Neurological: Negative.    Hematological: Negative.    Psychiatric/Behavioral: anxiety.        Objective:   Physical Exam   Constitutional: She is oriented to person, place, and time. She appears well-developed and well-nourished.   Psychiatric: She has a normal mood and affect. Her behavior is normal. Judgment and thought content normal.       Assessment:       1. Intractable chronic migraine without aura   2. Myofacial pain (pressure points)- masseter, neck   3. Tonsillar hernia into foramen magnum      Her HA are unlikely from a TAC and more likely from her tonsillar herniation and posture   Visual aura   Vest migraine - quick shift   Neck and upper chest - vibrating sensation      MRI C spine  Impression       No focal disk abnormalities or significant central canal or neural foraminal stenosis.  ______________________________________     Electronically signed by resident: ELIUD LEVY MD  Date:  11/07/14  Time: 10:51      MRI brain  Impression        Approximate 3 mm of cerebellar tonsillar ectopia.    2-3 punctate foci of T2/flair signal hyperintensity frontal subcortical white matter which are nonspecific and of uncertain clinical significance.    Otherwise unremarkable MRI brain without evidence for acute infarction or enhancing lesion.    Electronically signed by: TRISH GOFF DO  Date: 10/24/14  Time: 14:27       Degenerative findings:   C2-C3: No spinal canal stenosis or neural foraminal narrowing.   C3-C4: Posterior disc osteophyte complex and left facet joint arthropathy resulting in mild left-sided neural foraminal narrowing.  No spinal canal stenosis.   C4-C5: Posterior disc osteophyte complex with no significant spinal canal stenosis or neural foraminal narrowing.   C5-C6: Posterior disc osteophyte complex with mild spinal canal stenosis.  No neural foraminal narrowing.   C6-C7: Posterior disc osteophyte complex with no significant spinal canal stenosis or neural foraminal narrowing.   C7-T1: No spinal canal stenosis or neural foraminal narrowing.  Redemonstration perineural cysts within the proximal extraforaminal space bilaterally at this level which possibly compressing on the exiting nerve roots.   Paraspinal muscles & soft tissues: Unremarkable.     Impression:   1. Redemonstration of perineural cysts bilaterally at the level of C7-T1, as above.  2. No significant spinal canal stenosis or neural foraminal narrowing throughout the cervical spine, as above.     Electronically signed by resident: Macho Boone  Date:                                            08/20/2020  Time:                                           09:29     Electronically signed by: Mauricio Pitt MD  Date:                                            08/20/2020  Time:                                           09:55      Results for LIASHA HUMPHREY (MRN [id number]) as of 10/11/2019 09:44    Ref. Range 2/5/2019 10:19    Vitamin B-12 Latest Ref Range: 210 - 950 pg/mL >2000 (H)   Thiamine Latest Ref Range: 38 - 122 ug/L 45   Vit D, 25-Hydroxy Latest Ref Range: 30 - 96 ng/mL 75   Vitamin B2 Latest Ref Range: 1 - 19 mcg/L 18   Vitamin B6 Latest Ref Range: 5 - 50 ug/L 131 (H)   Antigliadin Ab IgG Latest Ref Range: <20 UNITS 2   Antigliadin Abs, IgA Latest Ref Range: <20 UNITS 7   TTG IgA Latest Ref Range: <20 UNITS 9   TTG IgG Latest Ref Range: <20 UNITS 3   Immunoglobulin A (IgA) Latest Ref Range: 70 - 400 mg/dL 172      Plan:   cont estrogen, Singulair, amlodipine   Breakthrough headache - imitrex 100 mg, xanax, mobic, ubrelvy, pain cream   Dentist - Floyd Erickson ([facility])     BOTOX treatment response:   Prior to initiating BOTOX, the patient had  24  migraine days per month on average. This meets criteria for chronic migraine.   After starting BOTOX, the patient experienced a reduction in  21  days per month   After starting BOTOX, the patient experienced a reduction in > 100 hours of migraine symptoms per month   After starting BOTOX, the patient experienced a 50% reduction in burden of migraine days per month   Based on this information, BOTOX is medically necessary for the management of the patient's chronic migraine.     BOTOX Injection intervals:   Patient reports a 'wearing off effect' prior to the subsequent BOTOX injections at 12 weeks. This occurs at week 10  Symptoms of this a 'wearing off effect' include - worsening of migraine headache frequency and intensity   Medications used during the 'wearing off effect' period include ubrelvy  Based on this information, it is medically necessary for the patient to receive BOTOX therapy at an interval of every 10 weeks for the management of chronic migraine.     BOTOX was performed as an indicated therapy for intractable chronic migraine headaches given that the patient failed > 2 prophylactic medications. Failed - Topamax, Elavil, Amlodipine, Gabapentin, Propranolol       Botulinum Toxin Injection Procedure   Pre-operative diagnosis: Chronic migraine   Post-operative diagnosis: Same   Procedure: Chemical neurolysis   After risks and benefits were explained including bleeding, infection, worsening of pain, damage to the areas being injected, weakness of muscles, loss of muscle control, dysphagia if injecting the head or neck, facial droop if injecting the facial area, painful injection, allergic or other reaction to the medications being injected, and the failure of the procedure to help the problem, a signed consent was obtained.   The patient was placed in a comfortable area and the sites to be treated were identified.The area to be treated was prepped three times with alcohol and the alcohol allowed to dry. Next, a 30 gauge needle was used to inject the medication in the area to be treated.   Area(s) injected:   Total Botox used: 175 Units   Botox wastage: 25 Units   Injection sites:    muscle bilaterally ( a total of 5 units divided into 2 sites)   Procerus muscle (5 units)   Frontalis muscle bilaterally (a total of 20 units divided into 4 sites)   Temporalis muscle bilaterally (a total of 40 units divided into 8 sites)   Occipitalis muscle bilaterally (a total of 30 units divided into 6 sites)   Cervical paraspinal muscles (a total of 20 units divided into 4 sites)   Trapezius muscle bilaterally (a total of 30 units divided into 6 sites)  Masseter 5 units bel    Mastoid 5 units   Complications: none   RTC for the next Botox injection: 10 weeks      Repeat brain MRI       Patient verbalized understanding to plan. I answered all her questions to her satisfaction    Procedures

## 2025-07-17 NOTE — TELEPHONE ENCOUNTER
Called pt to confirm she is coming for her botox appt today at 1pm. Patient confirmed yes she will be there

## 2025-07-30 ENCOUNTER — OFFICE VISIT (OUTPATIENT)
Dept: OBSTETRICS AND GYNECOLOGY | Facility: CLINIC | Age: 55
End: 2025-07-30
Payer: COMMERCIAL

## 2025-07-30 VITALS
BODY MASS INDEX: 31.27 KG/M2 | WEIGHT: 183.19 LBS | SYSTOLIC BLOOD PRESSURE: 112 MMHG | DIASTOLIC BLOOD PRESSURE: 82 MMHG | HEIGHT: 64 IN

## 2025-07-30 DIAGNOSIS — Z79.890 HORMONE REPLACEMENT THERAPY (HRT): ICD-10-CM

## 2025-07-30 DIAGNOSIS — Z01.419 ENCOUNTER FOR ANNUAL ROUTINE GYNECOLOGICAL EXAMINATION: Primary | ICD-10-CM

## 2025-07-30 DIAGNOSIS — N64.4 BREAST PAIN: ICD-10-CM

## 2025-07-30 DIAGNOSIS — R45.4 IRRITABLE MOOD: ICD-10-CM

## 2025-07-30 PROCEDURE — 3074F SYST BP LT 130 MM HG: CPT | Mod: CPTII,S$GLB,, | Performed by: OBSTETRICS & GYNECOLOGY

## 2025-07-30 PROCEDURE — 3008F BODY MASS INDEX DOCD: CPT | Mod: CPTII,S$GLB,, | Performed by: OBSTETRICS & GYNECOLOGY

## 2025-07-30 PROCEDURE — 3079F DIAST BP 80-89 MM HG: CPT | Mod: CPTII,S$GLB,, | Performed by: OBSTETRICS & GYNECOLOGY

## 2025-07-30 PROCEDURE — 1159F MED LIST DOCD IN RCRD: CPT | Mod: CPTII,S$GLB,, | Performed by: OBSTETRICS & GYNECOLOGY

## 2025-07-30 PROCEDURE — 99999 PR PBB SHADOW E&M-EST. PATIENT-LVL III: CPT | Mod: PBBFAC,,, | Performed by: OBSTETRICS & GYNECOLOGY

## 2025-07-30 PROCEDURE — 99396 PREV VISIT EST AGE 40-64: CPT | Mod: S$GLB,,, | Performed by: OBSTETRICS & GYNECOLOGY

## 2025-07-30 PROCEDURE — 1160F RVW MEDS BY RX/DR IN RCRD: CPT | Mod: CPTII,S$GLB,, | Performed by: OBSTETRICS & GYNECOLOGY

## 2025-07-30 RX ORDER — ESTRADIOL 0.05 MG/D
1 PATCH TRANSDERMAL
Qty: 4 PATCH | Refills: 11 | Status: SHIPPED | OUTPATIENT
Start: 2025-07-30 | End: 2026-07-30

## 2025-07-30 RX ORDER — PROGESTERONE 100 MG/1
100 CAPSULE ORAL NIGHTLY
Qty: 30 CAPSULE | Refills: 11 | Status: SHIPPED | OUTPATIENT
Start: 2025-07-30 | End: 2026-07-30

## 2025-07-30 NOTE — PROGRESS NOTES
Chief Complaint: Well Woman Exam     HPI:      Cheryle Olivares is a 55 y.o.  who presents today for well woman exam.  LMP: No LMP recorded (lmp unknown). Patient has had a hysterectomy.  No issues, problems, or complaints. Specifically, patient denies abnormal vaginal bleeding, discharge, pelvic pain, urinary problems, or changes in appetite. Ms. Olivares is currently sexually active with a single male partner.  She declines STD screening today. Has been doing very well on estrace 0.5 mg daily but her neurologist is now concerned that she is developing atypical auras to her migraines. Due to this, transdermal estrogen is a safer option for the patient due to decreased stroke risk. Has noticed bilateral breast pain for the past few months. Happens more often on the right than left. Feels like a long needle stabbing through the lateral aspect of the breast. Pt has had fibroadenomas in the vicinity in the past. Has not increased caffeine consumption significantly. Was sleeping on an air mattress until very recently due to home renovation. Episodes last for a few minutes. Mood has been more irritable recently.    Previous Pap: no abnormalities per patient (hyst 2/2 AUB)  Previous Mammogram: BiRads: 1 T-C Score: 7.96% (25)  Most Recent Colonoscopy: WNL (2021), Repeat in     OB History          2    Para   2    Term   2            AB        Living   2         SAB        IAB        Ectopic        Multiple        Live Births   2               ROS:     GENERAL: Denies unintentional weight gain or weight loss. Feeling well overall.   SKIN: Denies rash or lesions.   HEENT: Denies headaches, or vision changes.   CARDIOVASCULAR: Denies palpitations or chest pain.   RESPIRATORY: Denies shortness of breath or dyspnea on exertion.  BREASTS: Denies lumps or nipple discharge.   ABDOMEN: Denies constipation, diarrhea, nausea, vomiting, change in appetite.  URINARY: Denies frequency,  "dysuria.  NEUROLOGIC: Denies syncope or weakness.   PSYCHIATRIC: Denies uncontrolled depression or anxiety.    Physical Exam:      PHYSICAL EXAM:  /82 (Patient Position: Sitting)   Ht 5' 4" (1.626 m)   Wt 83.1 kg (183 lb 3.2 oz)   LMP  (LMP Unknown)   BMI 31.45 kg/m²   Body mass index is 31.45 kg/m².     APPEARANCE: Well nourished, well developed, in no acute distress.  PSYCH: Appropriate mood and affect.  SKIN: No acne or hirsutism  NECK: Neck symmetric without masses  NODES: No inguinal, axillary, or supraclavicular lymph node enlargement  ABDOMEN: Soft.  No tenderness or masses.    CARDIOVASCULAR: No edema of peripheral extremities  BREASTS: Symmetrical, no visible skin lesions. No palpable masses. No nipple discharge bilaterally. Bilateral breasts tender along suspensory ligaments. This is the area where patient is feeling the pains.  PELVIC: Normal external genitalia without lesions.  Normal hair distribution.  Adequate perineal body, normal urethral meatus.  Vagina moist and smooth. Without lesions. Vagina without discharge.  Normal appearing vaginal cuff.  No significant cystocele or rectocele.  Bimanual exam shows no midline or adnexal masses.      Female chaperone present for breast and pelvic exam.     Assessment/Plan:     1. Encounter for annual routine gynecological examination (Primary)  - All preventive care UTD.   - Pt will be due for colonoscopy next year and DEXA at age 61.    2. Breast pain  - US Breast Bilateral Limited; Future  - Message sent to Linda rob.    3. Hormone replacement therapy (HRT)  - Switching from oral estradiol to transdermal patch at same dose   - estradiol 0.05 mg/24 hr td ptwk 0.05 mg/24 hr PTWK; Place 1 patch onto the skin every 7 days.  Dispense: 4 patch; Refill: 11    4. Irritable mood  - progesterone (PROMETRIUM) 100 MG capsule; Take 1 capsule (100 mg total) by mouth nightly.  Dispense: 30 capsule; Refill: 11  - I will check in with patient in a few weeks " to see how she's doing on the prometrium       Follow up in about 1 year (around 7/30/2026) for Annual Exam.    Counseling:     Patient was counseled today on current ASCCP pap guidelines, the recommendation for yearly physical exams, safe driving habits, breast self awareness and annual mammograms. She is to see her PCP for other health maintenance.     Use of the [facility] Patient Portal discussed and encouraged during today's visit.

## 2025-07-31 ENCOUNTER — TELEPHONE (OUTPATIENT)
Dept: RADIOLOGY | Facility: HOSPITAL | Age: 55
End: 2025-07-31
Payer: COMMERCIAL

## 2025-07-31 NOTE — TELEPHONE ENCOUNTER
----- Message from Linda Durbin MD sent at 7/30/2025  2:18 PM CDT -----  Please help patient get scheduled for bilateral breast ultrasound (Mammogram up to date - has tenderness to suspensory ligaments. I do not think a diagnostic mammogram is needed, just want a limited breast ultrasound for patient reassurance)  Thank you!!

## 2025-08-07 ENCOUNTER — HOSPITAL ENCOUNTER (OUTPATIENT)
Dept: RADIOLOGY | Facility: HOSPITAL | Age: 55
Discharge: HOME | End: 2025-08-07
Attending: OBSTETRICS & GYNECOLOGY
Payer: COMMERCIAL

## 2025-08-07 DIAGNOSIS — N64.4 BREAST PAIN: ICD-10-CM

## 2025-08-07 PROCEDURE — 76642 ULTRASOUND BREAST LIMITED: CPT | Mod: 26,50,, | Performed by: RADIOLOGY

## 2025-08-07 PROCEDURE — 76642 ULTRASOUND BREAST LIMITED: CPT | Mod: TC,50

## 2025-08-12 DIAGNOSIS — Z71.89 COUNSELING FOR ESTROGEN REPLACEMENT THERAPY: ICD-10-CM

## 2025-08-12 RX ORDER — ESTRADIOL 0.5 MG/1
TABLET ORAL
Qty: 90 TABLET | Refills: 0 | OUTPATIENT
Start: 2025-08-12

## 2025-08-30 ENCOUNTER — OFFICE VISIT (OUTPATIENT)
Dept: URGENT CARE | Facility: CLINIC | Age: 55
End: 2025-08-30
Payer: COMMERCIAL

## 2025-08-30 PROCEDURE — 87186 SC STD MICRODIL/AGAR DIL: CPT | Performed by: NURSE PRACTITIONER

## 2025-09-06 ENCOUNTER — OFFICE VISIT (OUTPATIENT)
Dept: URGENT CARE | Facility: CLINIC | Age: 55
End: 2025-09-06
Payer: COMMERCIAL

## 2025-09-06 VITALS
HEART RATE: 86 BPM | OXYGEN SATURATION: 97 % | SYSTOLIC BLOOD PRESSURE: 120 MMHG | HEIGHT: 64 IN | BODY MASS INDEX: 31.24 KG/M2 | RESPIRATION RATE: 16 BRPM | DIASTOLIC BLOOD PRESSURE: 78 MMHG | WEIGHT: 183 LBS | TEMPERATURE: 99 F

## 2025-09-06 DIAGNOSIS — N39.0 RECURRENT UTI: Primary | ICD-10-CM

## 2025-09-06 DIAGNOSIS — R30.0 DYSURIA: ICD-10-CM

## 2025-09-06 LAB
BILIRUBIN, UA POC OHS: NEGATIVE
BLOOD, UA POC OHS: ABNORMAL
CLARITY, UA POC OHS: CLEAR
COLOR, UA POC OHS: YELLOW
GLUCOSE, UA POC OHS: NEGATIVE
KETONES, UA POC OHS: NEGATIVE
LEUKOCYTES, UA POC OHS: ABNORMAL
NITRITE, UA POC OHS: NEGATIVE
PH, UA POC OHS: 5.5
PROTEIN, UA POC OHS: NEGATIVE
SPECIFIC GRAVITY, UA POC OHS: >=1.03
UROBILINOGEN, UA POC OHS: 0.2

## 2025-09-06 RX ORDER — CEPHALEXIN 500 MG/1
500 CAPSULE ORAL EVERY 12 HOURS
Qty: 14 CAPSULE | Refills: 0 | Status: SHIPPED | OUTPATIENT
Start: 2025-09-06 | End: 2025-09-13

## 2025-09-06 RX ORDER — PHENAZOPYRIDINE HYDROCHLORIDE 200 MG/1
200 TABLET, FILM COATED ORAL 3 TIMES DAILY PRN
Qty: 6 TABLET | Refills: 0 | Status: SHIPPED | OUTPATIENT
Start: 2025-09-06 | End: 2025-09-08